# Patient Record
Sex: MALE | Race: WHITE | Employment: FULL TIME | URBAN - NONMETROPOLITAN AREA
[De-identification: names, ages, dates, MRNs, and addresses within clinical notes are randomized per-mention and may not be internally consistent; named-entity substitution may affect disease eponyms.]

---

## 2017-01-11 ENCOUNTER — OFFICE VISIT (OUTPATIENT)
Dept: NEUROLOGY | Age: 37
End: 2017-01-11
Payer: COMMERCIAL

## 2017-01-11 VITALS
BODY MASS INDEX: 46.65 KG/M2 | DIASTOLIC BLOOD PRESSURE: 90 MMHG | HEIGHT: 69 IN | HEART RATE: 88 BPM | SYSTOLIC BLOOD PRESSURE: 140 MMHG | WEIGHT: 315 LBS

## 2017-01-11 DIAGNOSIS — G47.33 OBSTRUCTIVE SLEEP APNEA: Primary | ICD-10-CM

## 2017-01-11 DIAGNOSIS — Z99.89 CPAP (CONTINUOUS POSITIVE AIRWAY PRESSURE) DEPENDENCE: ICD-10-CM

## 2017-01-11 DIAGNOSIS — G47.419 NARCOLEPSY WITHOUT CATAPLEXY(347.00): ICD-10-CM

## 2017-01-11 PROCEDURE — 99213 OFFICE O/P EST LOW 20 MIN: CPT | Performed by: PHYSICIAN ASSISTANT

## 2017-01-11 RX ORDER — DEXTROAMPHETAMINE SACCHARATE, AMPHETAMINE ASPARTATE MONOHYDRATE, DEXTROAMPHETAMINE SULFATE AND AMPHETAMINE SULFATE 7.5; 7.5; 7.5; 7.5 MG/1; MG/1; MG/1; MG/1
30 CAPSULE, EXTENDED RELEASE ORAL 2 TIMES DAILY
Qty: 60 CAPSULE | Refills: 0 | Status: SHIPPED | OUTPATIENT
Start: 2017-01-11 | End: 2017-02-14 | Stop reason: SDUPTHER

## 2017-02-14 RX ORDER — DEXTROAMPHETAMINE SACCHARATE, AMPHETAMINE ASPARTATE MONOHYDRATE, DEXTROAMPHETAMINE SULFATE AND AMPHETAMINE SULFATE 7.5; 7.5; 7.5; 7.5 MG/1; MG/1; MG/1; MG/1
30 CAPSULE, EXTENDED RELEASE ORAL 2 TIMES DAILY
Qty: 60 CAPSULE | Refills: 0 | Status: SHIPPED | OUTPATIENT
Start: 2017-02-14 | End: 2017-03-13 | Stop reason: SDUPTHER

## 2017-03-13 RX ORDER — DEXTROAMPHETAMINE SACCHARATE, AMPHETAMINE ASPARTATE MONOHYDRATE, DEXTROAMPHETAMINE SULFATE AND AMPHETAMINE SULFATE 7.5; 7.5; 7.5; 7.5 MG/1; MG/1; MG/1; MG/1
30 CAPSULE, EXTENDED RELEASE ORAL 2 TIMES DAILY
Qty: 60 CAPSULE | Refills: 0 | Status: SHIPPED | OUTPATIENT
Start: 2017-03-13 | End: 2017-04-11 | Stop reason: SDUPTHER

## 2017-04-11 RX ORDER — DEXTROAMPHETAMINE SACCHARATE, AMPHETAMINE ASPARTATE MONOHYDRATE, DEXTROAMPHETAMINE SULFATE AND AMPHETAMINE SULFATE 7.5; 7.5; 7.5; 7.5 MG/1; MG/1; MG/1; MG/1
30 CAPSULE, EXTENDED RELEASE ORAL 2 TIMES DAILY
Qty: 60 CAPSULE | Refills: 0 | Status: SHIPPED | OUTPATIENT
Start: 2017-04-12 | End: 2017-05-08 | Stop reason: SDUPTHER

## 2017-04-25 ENCOUNTER — OFFICE VISIT (OUTPATIENT)
Dept: NEUROLOGY | Age: 37
End: 2017-04-25
Payer: COMMERCIAL

## 2017-04-25 VITALS
BODY MASS INDEX: 46.65 KG/M2 | RESPIRATION RATE: 22 BRPM | HEART RATE: 94 BPM | HEIGHT: 69 IN | WEIGHT: 315 LBS | DIASTOLIC BLOOD PRESSURE: 88 MMHG | SYSTOLIC BLOOD PRESSURE: 138 MMHG

## 2017-04-25 DIAGNOSIS — G47.33 OBSTRUCTIVE SLEEP APNEA: Primary | ICD-10-CM

## 2017-04-25 DIAGNOSIS — G47.419 NARCOLEPSY WITHOUT CATAPLEXY(347.00): ICD-10-CM

## 2017-04-25 PROCEDURE — 99213 OFFICE O/P EST LOW 20 MIN: CPT | Performed by: PSYCHIATRY & NEUROLOGY

## 2017-05-08 RX ORDER — DEXTROAMPHETAMINE SACCHARATE, AMPHETAMINE ASPARTATE MONOHYDRATE, DEXTROAMPHETAMINE SULFATE AND AMPHETAMINE SULFATE 7.5; 7.5; 7.5; 7.5 MG/1; MG/1; MG/1; MG/1
30 CAPSULE, EXTENDED RELEASE ORAL 2 TIMES DAILY
Qty: 60 CAPSULE | Refills: 0 | Status: SHIPPED | OUTPATIENT
Start: 2017-05-12 | End: 2017-06-08 | Stop reason: SDUPTHER

## 2017-05-12 ENCOUNTER — TELEPHONE (OUTPATIENT)
Dept: NEUROLOGY | Age: 37
End: 2017-05-12

## 2017-06-08 RX ORDER — DEXTROAMPHETAMINE SACCHARATE, AMPHETAMINE ASPARTATE MONOHYDRATE, DEXTROAMPHETAMINE SULFATE AND AMPHETAMINE SULFATE 7.5; 7.5; 7.5; 7.5 MG/1; MG/1; MG/1; MG/1
30 CAPSULE, EXTENDED RELEASE ORAL 2 TIMES DAILY
Qty: 60 CAPSULE | Refills: 0 | Status: SHIPPED | OUTPATIENT
Start: 2017-06-08 | End: 2017-07-05 | Stop reason: SDUPTHER

## 2017-07-05 RX ORDER — DEXTROAMPHETAMINE SACCHARATE, AMPHETAMINE ASPARTATE MONOHYDRATE, DEXTROAMPHETAMINE SULFATE AND AMPHETAMINE SULFATE 7.5; 7.5; 7.5; 7.5 MG/1; MG/1; MG/1; MG/1
30 CAPSULE, EXTENDED RELEASE ORAL 2 TIMES DAILY
Qty: 60 CAPSULE | Refills: 0 | Status: SHIPPED | OUTPATIENT
Start: 2017-07-08 | End: 2017-08-04 | Stop reason: SDUPTHER

## 2017-08-07 RX ORDER — DEXTROAMPHETAMINE SACCHARATE, AMPHETAMINE ASPARTATE MONOHYDRATE, DEXTROAMPHETAMINE SULFATE AND AMPHETAMINE SULFATE 7.5; 7.5; 7.5; 7.5 MG/1; MG/1; MG/1; MG/1
30 CAPSULE, EXTENDED RELEASE ORAL 2 TIMES DAILY
Qty: 60 CAPSULE | Refills: 0 | Status: SHIPPED | OUTPATIENT
Start: 2017-08-07 | End: 2017-09-06 | Stop reason: SDUPTHER

## 2017-08-28 ENCOUNTER — OFFICE VISIT (OUTPATIENT)
Dept: NEUROLOGY | Age: 37
End: 2017-08-28
Payer: COMMERCIAL

## 2017-08-28 VITALS
SYSTOLIC BLOOD PRESSURE: 145 MMHG | OXYGEN SATURATION: 96 % | WEIGHT: 315 LBS | HEIGHT: 69 IN | BODY MASS INDEX: 46.65 KG/M2 | HEART RATE: 107 BPM | DIASTOLIC BLOOD PRESSURE: 89 MMHG

## 2017-08-28 DIAGNOSIS — G47.419 NARCOLEPSY WITHOUT CATAPLEXY(347.00): ICD-10-CM

## 2017-08-28 DIAGNOSIS — G47.33 OBSTRUCTIVE SLEEP APNEA: Primary | ICD-10-CM

## 2017-08-28 DIAGNOSIS — Z99.89 CPAP (CONTINUOUS POSITIVE AIRWAY PRESSURE) DEPENDENCE: ICD-10-CM

## 2017-08-28 PROCEDURE — 99213 OFFICE O/P EST LOW 20 MIN: CPT | Performed by: PHYSICIAN ASSISTANT

## 2017-09-08 RX ORDER — DEXTROAMPHETAMINE SACCHARATE, AMPHETAMINE ASPARTATE MONOHYDRATE, DEXTROAMPHETAMINE SULFATE AND AMPHETAMINE SULFATE 7.5; 7.5; 7.5; 7.5 MG/1; MG/1; MG/1; MG/1
30 CAPSULE, EXTENDED RELEASE ORAL 2 TIMES DAILY
Qty: 60 CAPSULE | Refills: 0 | Status: CANCELLED | OUTPATIENT
Start: 2017-09-08

## 2017-09-08 RX ORDER — DEXTROAMPHETAMINE SACCHARATE, AMPHETAMINE ASPARTATE MONOHYDRATE, DEXTROAMPHETAMINE SULFATE AND AMPHETAMINE SULFATE 7.5; 7.5; 7.5; 7.5 MG/1; MG/1; MG/1; MG/1
30 CAPSULE, EXTENDED RELEASE ORAL 2 TIMES DAILY
Qty: 60 CAPSULE | Refills: 0 | Status: SHIPPED | OUTPATIENT
Start: 2017-09-08 | End: 2017-10-05 | Stop reason: SDUPTHER

## 2017-10-05 RX ORDER — DEXTROAMPHETAMINE SACCHARATE, AMPHETAMINE ASPARTATE MONOHYDRATE, DEXTROAMPHETAMINE SULFATE AND AMPHETAMINE SULFATE 7.5; 7.5; 7.5; 7.5 MG/1; MG/1; MG/1; MG/1
30 CAPSULE, EXTENDED RELEASE ORAL 2 TIMES DAILY
Qty: 60 CAPSULE | Refills: 0 | Status: SHIPPED | OUTPATIENT
Start: 2017-10-08 | End: 2017-11-08 | Stop reason: SDUPTHER

## 2017-10-05 NOTE — TELEPHONE ENCOUNTER
Patient is requesting refill for Adderall.   Last OV 8/28/17  Next OV  12/04/17  Last Rx 9/8/17  [facility]

## 2017-11-08 NOTE — TELEPHONE ENCOUNTER
Patient is requesting refill for Adderall.   Last OV 8/28/17  Next OV  12/4/17  Last Rx 10/08/17  Cjlorna Adamso 8/27/17

## 2017-11-10 RX ORDER — DEXTROAMPHETAMINE SACCHARATE, AMPHETAMINE ASPARTATE MONOHYDRATE, DEXTROAMPHETAMINE SULFATE AND AMPHETAMINE SULFATE 7.5; 7.5; 7.5; 7.5 MG/1; MG/1; MG/1; MG/1
30 CAPSULE, EXTENDED RELEASE ORAL 2 TIMES DAILY
Qty: 60 CAPSULE | Refills: 0 | Status: SHIPPED | OUTPATIENT
Start: 2017-11-10 | End: 2017-12-04 | Stop reason: SDUPTHER

## 2017-12-04 ENCOUNTER — OFFICE VISIT (OUTPATIENT)
Dept: NEUROLOGY | Age: 37
End: 2017-12-04
Payer: COMMERCIAL

## 2017-12-04 VITALS
WEIGHT: 315 LBS | DIASTOLIC BLOOD PRESSURE: 94 MMHG | SYSTOLIC BLOOD PRESSURE: 149 MMHG | HEART RATE: 97 BPM | BODY MASS INDEX: 46.65 KG/M2 | RESPIRATION RATE: 22 BRPM | HEIGHT: 69 IN

## 2017-12-04 DIAGNOSIS — G47.33 OBSTRUCTIVE SLEEP APNEA: ICD-10-CM

## 2017-12-04 DIAGNOSIS — G47.419 NARCOLEPSY WITHOUT CATAPLEXY: Primary | ICD-10-CM

## 2017-12-04 PROCEDURE — 1036F TOBACCO NON-USER: CPT | Performed by: PSYCHIATRY & NEUROLOGY

## 2017-12-04 PROCEDURE — G8417 CALC BMI ABV UP PARAM F/U: HCPCS | Performed by: PSYCHIATRY & NEUROLOGY

## 2017-12-04 PROCEDURE — 99214 OFFICE O/P EST MOD 30 MIN: CPT | Performed by: PSYCHIATRY & NEUROLOGY

## 2017-12-04 PROCEDURE — G8427 DOCREV CUR MEDS BY ELIG CLIN: HCPCS | Performed by: PSYCHIATRY & NEUROLOGY

## 2017-12-04 PROCEDURE — G8484 FLU IMMUNIZE NO ADMIN: HCPCS | Performed by: PSYCHIATRY & NEUROLOGY

## 2017-12-04 RX ORDER — DEXTROAMPHETAMINE SACCHARATE, AMPHETAMINE ASPARTATE MONOHYDRATE, DEXTROAMPHETAMINE SULFATE AND AMPHETAMINE SULFATE 7.5; 7.5; 7.5; 7.5 MG/1; MG/1; MG/1; MG/1
30 CAPSULE, EXTENDED RELEASE ORAL 2 TIMES DAILY
Qty: 60 CAPSULE | Refills: 0 | Status: SHIPPED | OUTPATIENT
Start: 2017-12-04 | End: 2018-01-03 | Stop reason: SDUPTHER

## 2017-12-04 NOTE — PROGRESS NOTES
327.23    He is sleep deprived and not using his CPAP    Plan:   1. Advised him to get more sleep and use his CPAP. Discussed cleaning CPAP and humidifier chamber and the So Clean device  2. I discussed the diagnosis of obstructive sleep apnea with Luz Fish including the pathophysiology (namely the mechanism of breathing and obstruction of upper airway, interruptions of sleep, hypoxemia, hypercapnia, and results of repetitive sympathetic activation), risks, evaluation, and treatment options. 3. I discussed the risks of driving when drowsy and advised that Luz Fish not drive when drowsy and avoid sedating medications and respiratory suppressants. 4. FU in 4 months.     Electronically signed by Julianne Garcia MD on 12/4/2017

## 2018-01-03 RX ORDER — DEXTROAMPHETAMINE SACCHARATE, AMPHETAMINE ASPARTATE MONOHYDRATE, DEXTROAMPHETAMINE SULFATE AND AMPHETAMINE SULFATE 7.5; 7.5; 7.5; 7.5 MG/1; MG/1; MG/1; MG/1
30 CAPSULE, EXTENDED RELEASE ORAL 2 TIMES DAILY
Qty: 60 CAPSULE | Refills: 0 | Status: SHIPPED | OUTPATIENT
Start: 2018-01-03 | End: 2018-02-05 | Stop reason: SDUPTHER

## 2018-01-18 ENCOUNTER — TELEPHONE (OUTPATIENT)
Dept: NEUROLOGY | Age: 38
End: 2018-01-18

## 2018-02-05 RX ORDER — DEXTROAMPHETAMINE SACCHARATE, AMPHETAMINE ASPARTATE MONOHYDRATE, DEXTROAMPHETAMINE SULFATE AND AMPHETAMINE SULFATE 7.5; 7.5; 7.5; 7.5 MG/1; MG/1; MG/1; MG/1
30 CAPSULE, EXTENDED RELEASE ORAL 2 TIMES DAILY
Qty: 60 CAPSULE | Refills: 0 | Status: SHIPPED | OUTPATIENT
Start: 2018-02-05 | End: 2018-03-07 | Stop reason: SDUPTHER

## 2018-03-07 RX ORDER — DEXTROAMPHETAMINE SACCHARATE, AMPHETAMINE ASPARTATE MONOHYDRATE, DEXTROAMPHETAMINE SULFATE AND AMPHETAMINE SULFATE 7.5; 7.5; 7.5; 7.5 MG/1; MG/1; MG/1; MG/1
30 CAPSULE, EXTENDED RELEASE ORAL 2 TIMES DAILY
Qty: 60 CAPSULE | Refills: 0 | Status: SHIPPED | OUTPATIENT
Start: 2018-03-08 | End: 2018-04-05 | Stop reason: SDUPTHER

## 2018-04-05 RX ORDER — DEXTROAMPHETAMINE SACCHARATE, AMPHETAMINE ASPARTATE MONOHYDRATE, DEXTROAMPHETAMINE SULFATE AND AMPHETAMINE SULFATE 7.5; 7.5; 7.5; 7.5 MG/1; MG/1; MG/1; MG/1
30 CAPSULE, EXTENDED RELEASE ORAL 2 TIMES DAILY
Qty: 60 CAPSULE | Refills: 0 | Status: SHIPPED | OUTPATIENT
Start: 2018-04-05 | End: 2018-05-03 | Stop reason: SDUPTHER

## 2018-05-01 ENCOUNTER — TELEPHONE (OUTPATIENT)
Dept: NEUROLOGY | Age: 38
End: 2018-05-01

## 2018-05-02 ENCOUNTER — OFFICE VISIT (OUTPATIENT)
Dept: NEUROSURGERY | Age: 38
End: 2018-05-02
Payer: COMMERCIAL

## 2018-05-02 VITALS
HEART RATE: 104 BPM | WEIGHT: 315 LBS | BODY MASS INDEX: 46.65 KG/M2 | HEIGHT: 69 IN | DIASTOLIC BLOOD PRESSURE: 96 MMHG | SYSTOLIC BLOOD PRESSURE: 156 MMHG

## 2018-05-02 DIAGNOSIS — Z99.89 CPAP (CONTINUOUS POSITIVE AIRWAY PRESSURE) DEPENDENCE: ICD-10-CM

## 2018-05-02 DIAGNOSIS — G47.33 OBSTRUCTIVE SLEEP APNEA: Primary | ICD-10-CM

## 2018-05-02 DIAGNOSIS — G47.419 NARCOLEPSY WITHOUT CATAPLEXY: ICD-10-CM

## 2018-05-02 PROCEDURE — 99213 OFFICE O/P EST LOW 20 MIN: CPT | Performed by: NURSE PRACTITIONER

## 2018-05-03 RX ORDER — DEXTROAMPHETAMINE SACCHARATE, AMPHETAMINE ASPARTATE MONOHYDRATE, DEXTROAMPHETAMINE SULFATE AND AMPHETAMINE SULFATE 7.5; 7.5; 7.5; 7.5 MG/1; MG/1; MG/1; MG/1
30 CAPSULE, EXTENDED RELEASE ORAL 2 TIMES DAILY
Qty: 62 CAPSULE | Refills: 0 | Status: SHIPPED | OUTPATIENT
Start: 2018-05-05 | End: 2018-06-11 | Stop reason: SDUPTHER

## 2018-05-09 ENCOUNTER — OFFICE VISIT (OUTPATIENT)
Dept: FAMILY MEDICINE CLINIC | Age: 38
End: 2018-05-09
Payer: COMMERCIAL

## 2018-05-09 VITALS
DIASTOLIC BLOOD PRESSURE: 90 MMHG | HEIGHT: 69 IN | BODY MASS INDEX: 46.65 KG/M2 | TEMPERATURE: 96.7 F | WEIGHT: 315 LBS | HEART RATE: 96 BPM | OXYGEN SATURATION: 97 % | SYSTOLIC BLOOD PRESSURE: 150 MMHG

## 2018-05-09 DIAGNOSIS — M54.6 CHRONIC MIDLINE THORACIC BACK PAIN: ICD-10-CM

## 2018-05-09 DIAGNOSIS — G47.419 NARCOLEPSY WITHOUT CATAPLEXY: Primary | ICD-10-CM

## 2018-05-09 DIAGNOSIS — M54.42 CHRONIC MIDLINE LOW BACK PAIN WITH BILATERAL SCIATICA: ICD-10-CM

## 2018-05-09 DIAGNOSIS — G89.29 CHRONIC MIDLINE THORACIC BACK PAIN: ICD-10-CM

## 2018-05-09 DIAGNOSIS — M54.41 CHRONIC MIDLINE LOW BACK PAIN WITH BILATERAL SCIATICA: ICD-10-CM

## 2018-05-09 DIAGNOSIS — E78.01 FAMILIAL HYPERCHOLESTEROLEMIA: ICD-10-CM

## 2018-05-09 DIAGNOSIS — G47.33 OBSTRUCTIVE SLEEP APNEA: ICD-10-CM

## 2018-05-09 DIAGNOSIS — G89.29 CHRONIC MIDLINE LOW BACK PAIN WITH BILATERAL SCIATICA: ICD-10-CM

## 2018-05-09 DIAGNOSIS — I10 ESSENTIAL HYPERTENSION: ICD-10-CM

## 2018-05-09 PROBLEM — E78.5 HYPERLIPIDEMIA: Status: ACTIVE | Noted: 2018-05-09

## 2018-05-09 PROCEDURE — 1036F TOBACCO NON-USER: CPT | Performed by: NURSE PRACTITIONER

## 2018-05-09 PROCEDURE — 99203 OFFICE O/P NEW LOW 30 MIN: CPT | Performed by: NURSE PRACTITIONER

## 2018-05-09 PROCEDURE — G8427 DOCREV CUR MEDS BY ELIG CLIN: HCPCS | Performed by: NURSE PRACTITIONER

## 2018-05-09 PROCEDURE — G8417 CALC BMI ABV UP PARAM F/U: HCPCS | Performed by: NURSE PRACTITIONER

## 2018-05-09 RX ORDER — NAPROXEN 500 MG/1
500 TABLET ORAL 2 TIMES DAILY PRN
Qty: 60 TABLET | Refills: 1 | Status: SHIPPED | OUTPATIENT
Start: 2018-05-09 | End: 2019-05-24 | Stop reason: ALTCHOICE

## 2018-05-09 RX ORDER — LISINOPRIL 10 MG/1
10 TABLET ORAL DAILY
Qty: 30 TABLET | Refills: 3 | Status: SHIPPED | OUTPATIENT
Start: 2018-05-09 | End: 2018-07-10 | Stop reason: SDUPTHER

## 2018-05-09 ASSESSMENT — ENCOUNTER SYMPTOMS
SHORTNESS OF BREATH: 0
NAUSEA: 0
CHEST TIGHTNESS: 0
DIARRHEA: 0
SORE THROAT: 0
ABDOMINAL PAIN: 0
BACK PAIN: 1
WHEEZING: 0
COUGH: 0

## 2018-05-09 ASSESSMENT — PATIENT HEALTH QUESTIONNAIRE - PHQ9
1. LITTLE INTEREST OR PLEASURE IN DOING THINGS: 0
SUM OF ALL RESPONSES TO PHQ9 QUESTIONS 1 & 2: 0
SUM OF ALL RESPONSES TO PHQ QUESTIONS 1-9: 0
2. FEELING DOWN, DEPRESSED OR HOPELESS: 0

## 2018-05-14 DIAGNOSIS — E78.01 FAMILIAL HYPERCHOLESTEROLEMIA: ICD-10-CM

## 2018-05-14 DIAGNOSIS — I10 ESSENTIAL HYPERTENSION: ICD-10-CM

## 2018-05-14 LAB
ALBUMIN SERPL-MCNC: 4 G/DL (ref 3.5–5.2)
ALP BLD-CCNC: 62 U/L (ref 40–130)
ALT SERPL-CCNC: 59 U/L (ref 5–41)
ANION GAP SERPL CALCULATED.3IONS-SCNC: 14 MMOL/L (ref 7–19)
AST SERPL-CCNC: 33 U/L (ref 5–40)
BASOPHILS ABSOLUTE: 0.1 K/UL (ref 0–0.2)
BASOPHILS RELATIVE PERCENT: 0.9 % (ref 0–1)
BILIRUB SERPL-MCNC: 0.3 MG/DL (ref 0.2–1.2)
BUN BLDV-MCNC: 14 MG/DL (ref 6–20)
CALCIUM SERPL-MCNC: 8.8 MG/DL (ref 8.6–10)
CHLORIDE BLD-SCNC: 102 MMOL/L (ref 98–111)
CHOLESTEROL, TOTAL: 184 MG/DL (ref 160–199)
CO2: 26 MMOL/L (ref 22–29)
CREAT SERPL-MCNC: 0.9 MG/DL (ref 0.5–1.2)
EOSINOPHILS ABSOLUTE: 0.3 K/UL (ref 0–0.6)
EOSINOPHILS RELATIVE PERCENT: 3.1 % (ref 0–5)
GFR NON-AFRICAN AMERICAN: >60
GLUCOSE BLD-MCNC: 127 MG/DL (ref 74–109)
HCT VFR BLD CALC: 49 % (ref 42–52)
HDLC SERPL-MCNC: 42 MG/DL (ref 55–121)
HEMOGLOBIN: 15.6 G/DL (ref 14–18)
LDL CHOLESTEROL CALCULATED: 110 MG/DL
LYMPHOCYTES ABSOLUTE: 3.6 K/UL (ref 1.1–4.5)
LYMPHOCYTES RELATIVE PERCENT: 36.8 % (ref 20–40)
MCH RBC QN AUTO: 29 PG (ref 27–31)
MCHC RBC AUTO-ENTMCNC: 31.8 G/DL (ref 33–37)
MCV RBC AUTO: 91.1 FL (ref 80–94)
MONOCYTES ABSOLUTE: 1 K/UL (ref 0–0.9)
MONOCYTES RELATIVE PERCENT: 10.6 % (ref 0–10)
NEUTROPHILS ABSOLUTE: 4.7 K/UL (ref 1.5–7.5)
NEUTROPHILS RELATIVE PERCENT: 47.7 % (ref 50–65)
PDW BLD-RTO: 13.8 % (ref 11.5–14.5)
PLATELET # BLD: 316 K/UL (ref 130–400)
PMV BLD AUTO: 10.1 FL (ref 9.4–12.4)
POTASSIUM SERPL-SCNC: 4.2 MMOL/L (ref 3.5–5)
RBC # BLD: 5.38 M/UL (ref 4.7–6.1)
SODIUM BLD-SCNC: 142 MMOL/L (ref 136–145)
TOTAL PROTEIN: 6.8 G/DL (ref 6.6–8.7)
TRIGL SERPL-MCNC: 162 MG/DL (ref 0–149)
WBC # BLD: 9.8 K/UL (ref 4.8–10.8)

## 2018-05-25 ENCOUNTER — HOSPITAL ENCOUNTER (OUTPATIENT)
Dept: GENERAL RADIOLOGY | Age: 38
Discharge: HOME | End: 2018-05-25
Payer: COMMERCIAL

## 2018-05-25 DIAGNOSIS — M54.42 CHRONIC MIDLINE LOW BACK PAIN WITH BILATERAL SCIATICA: ICD-10-CM

## 2018-05-25 DIAGNOSIS — M54.41 CHRONIC MIDLINE LOW BACK PAIN WITH BILATERAL SCIATICA: ICD-10-CM

## 2018-05-25 DIAGNOSIS — G89.29 CHRONIC MIDLINE THORACIC BACK PAIN: Primary | ICD-10-CM

## 2018-05-25 DIAGNOSIS — M54.6 CHRONIC MIDLINE THORACIC BACK PAIN: Primary | ICD-10-CM

## 2018-05-25 DIAGNOSIS — G89.29 CHRONIC MIDLINE LOW BACK PAIN WITH BILATERAL SCIATICA: ICD-10-CM

## 2018-05-25 PROCEDURE — 72072 X-RAY EXAM THORAC SPINE 3VWS: CPT

## 2018-05-25 PROCEDURE — 72100 X-RAY EXAM L-S SPINE 2/3 VWS: CPT

## 2018-06-11 DIAGNOSIS — G47.419 NARCOLEPSY WITHOUT CATAPLEXY: Primary | ICD-10-CM

## 2018-06-11 RX ORDER — DEXTROAMPHETAMINE SACCHARATE, AMPHETAMINE ASPARTATE MONOHYDRATE, DEXTROAMPHETAMINE SULFATE AND AMPHETAMINE SULFATE 7.5; 7.5; 7.5; 7.5 MG/1; MG/1; MG/1; MG/1
30 CAPSULE, EXTENDED RELEASE ORAL 2 TIMES DAILY
Qty: 62 CAPSULE | Refills: 0 | Status: SHIPPED | OUTPATIENT
Start: 2018-06-11 | End: 2018-07-12 | Stop reason: SDUPTHER

## 2018-07-10 ENCOUNTER — OFFICE VISIT (OUTPATIENT)
Dept: FAMILY MEDICINE CLINIC | Age: 38
End: 2018-07-10
Payer: COMMERCIAL

## 2018-07-10 VITALS
OXYGEN SATURATION: 94 % | BODY MASS INDEX: 50.06 KG/M2 | WEIGHT: 315 LBS | SYSTOLIC BLOOD PRESSURE: 132 MMHG | RESPIRATION RATE: 18 BRPM | DIASTOLIC BLOOD PRESSURE: 90 MMHG | TEMPERATURE: 98.7 F | HEART RATE: 98 BPM

## 2018-07-10 DIAGNOSIS — R73.9 HYPERGLYCEMIA: ICD-10-CM

## 2018-07-10 DIAGNOSIS — G89.29 CHRONIC MIDLINE LOW BACK PAIN WITH BILATERAL SCIATICA: Primary | ICD-10-CM

## 2018-07-10 DIAGNOSIS — M13.0 POLYARTHRITIS: ICD-10-CM

## 2018-07-10 DIAGNOSIS — G89.29 CHRONIC MIDLINE THORACIC BACK PAIN: ICD-10-CM

## 2018-07-10 DIAGNOSIS — M54.6 CHRONIC MIDLINE THORACIC BACK PAIN: ICD-10-CM

## 2018-07-10 DIAGNOSIS — I10 ESSENTIAL (PRIMARY) HYPERTENSION: ICD-10-CM

## 2018-07-10 DIAGNOSIS — E66.01 MORBID OBESITY WITH BODY MASS INDEX (BMI) OF 50.0 TO 59.9 IN ADULT (HCC): ICD-10-CM

## 2018-07-10 DIAGNOSIS — M54.41 CHRONIC MIDLINE LOW BACK PAIN WITH BILATERAL SCIATICA: Primary | ICD-10-CM

## 2018-07-10 DIAGNOSIS — G47.33 OBSTRUCTIVE SLEEP APNEA: ICD-10-CM

## 2018-07-10 DIAGNOSIS — G47.419 NARCOLEPSY WITHOUT CATAPLEXY: ICD-10-CM

## 2018-07-10 DIAGNOSIS — M54.42 CHRONIC MIDLINE LOW BACK PAIN WITH BILATERAL SCIATICA: Primary | ICD-10-CM

## 2018-07-10 LAB
ALBUMIN SERPL-MCNC: 4.1 G/DL (ref 3.5–5.2)
ALP BLD-CCNC: 69 U/L (ref 40–130)
ALT SERPL-CCNC: 65 U/L (ref 5–41)
ANION GAP SERPL CALCULATED.3IONS-SCNC: 16 MMOL/L (ref 7–19)
AST SERPL-CCNC: 35 U/L (ref 5–40)
BASOPHILS ABSOLUTE: 0.1 K/UL (ref 0–0.2)
BASOPHILS RELATIVE PERCENT: 0.8 % (ref 0–1)
BILIRUB SERPL-MCNC: 0.3 MG/DL (ref 0.2–1.2)
BILIRUBIN URINE: NEGATIVE
BLOOD, URINE: NEGATIVE
BUN BLDV-MCNC: 11 MG/DL (ref 6–20)
C-REACTIVE PROTEIN: 1.31 MG/DL (ref 0–0.5)
CALCIUM SERPL-MCNC: 9 MG/DL (ref 8.6–10)
CHLORIDE BLD-SCNC: 102 MMOL/L (ref 98–111)
CLARITY: CLEAR
CO2: 24 MMOL/L (ref 22–29)
COLOR: YELLOW
CREAT SERPL-MCNC: 0.8 MG/DL (ref 0.5–1.2)
EOSINOPHILS ABSOLUTE: 0.1 K/UL (ref 0–0.6)
EOSINOPHILS RELATIVE PERCENT: 1.5 % (ref 0–5)
GFR NON-AFRICAN AMERICAN: >60
GLUCOSE BLD-MCNC: 106 MG/DL (ref 74–109)
GLUCOSE URINE: NEGATIVE MG/DL
HBA1C MFR BLD: 6.6 %
HCT VFR BLD CALC: 48.2 % (ref 42–52)
HEMOGLOBIN: 15.7 G/DL (ref 14–18)
KETONES, URINE: NEGATIVE MG/DL
LEUKOCYTE ESTERASE, URINE: NEGATIVE
LYMPHOCYTES ABSOLUTE: 3.4 K/UL (ref 1.1–4.5)
LYMPHOCYTES RELATIVE PERCENT: 35 % (ref 20–40)
MCH RBC QN AUTO: 29.5 PG (ref 27–31)
MCHC RBC AUTO-ENTMCNC: 32.6 G/DL (ref 33–37)
MCV RBC AUTO: 90.4 FL (ref 80–94)
MONOCYTES ABSOLUTE: 0.8 K/UL (ref 0–0.9)
MONOCYTES RELATIVE PERCENT: 8 % (ref 0–10)
NEUTROPHILS ABSOLUTE: 5.2 K/UL (ref 1.5–7.5)
NEUTROPHILS RELATIVE PERCENT: 54.1 % (ref 50–65)
NITRITE, URINE: NEGATIVE
PDW BLD-RTO: 13.7 % (ref 11.5–14.5)
PH UA: 6
PLATELET # BLD: 323 K/UL (ref 130–400)
PMV BLD AUTO: 10.3 FL (ref 9.4–12.4)
POTASSIUM SERPL-SCNC: 4.3 MMOL/L (ref 3.5–5)
PROTEIN UA: NEGATIVE MG/DL
RBC # BLD: 5.33 M/UL (ref 4.7–6.1)
RHEUMATOID FACTOR: <10 IU/ML
SEDIMENTATION RATE, ERYTHROCYTE: 20 MM/HR (ref 0–10)
SODIUM BLD-SCNC: 142 MMOL/L (ref 136–145)
SPECIFIC GRAVITY UA: 1.02
TOTAL PROTEIN: 7.4 G/DL (ref 6.6–8.7)
URIC ACID, SERUM: 7.2 MG/DL (ref 3.4–7)
UROBILINOGEN, URINE: 0.2 E.U./DL
WBC # BLD: 9.6 K/UL (ref 4.8–10.8)

## 2018-07-10 PROCEDURE — G8417 CALC BMI ABV UP PARAM F/U: HCPCS | Performed by: FAMILY MEDICINE

## 2018-07-10 PROCEDURE — 1036F TOBACCO NON-USER: CPT | Performed by: FAMILY MEDICINE

## 2018-07-10 PROCEDURE — G8427 DOCREV CUR MEDS BY ELIG CLIN: HCPCS | Performed by: FAMILY MEDICINE

## 2018-07-10 PROCEDURE — 99214 OFFICE O/P EST MOD 30 MIN: CPT | Performed by: FAMILY MEDICINE

## 2018-07-10 RX ORDER — TIZANIDINE 4 MG/1
4 TABLET ORAL 2 TIMES DAILY PRN
Qty: 30 TABLET | Refills: 2 | Status: SHIPPED | OUTPATIENT
Start: 2018-07-10 | End: 2018-12-16 | Stop reason: SDUPTHER

## 2018-07-10 RX ORDER — NAPROXEN 500 MG/1
TABLET ORAL
Refills: 1 | COMMUNITY
Start: 2018-05-09 | End: 2018-07-10

## 2018-07-10 RX ORDER — LISINOPRIL 10 MG/1
10 TABLET ORAL DAILY
Qty: 30 TABLET | Refills: 3 | Status: SHIPPED | OUTPATIENT
Start: 2018-07-10 | End: 2018-11-06 | Stop reason: SDUPTHER
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
## 2018-07-12 DIAGNOSIS — G47.419 NARCOLEPSY WITHOUT CATAPLEXY: ICD-10-CM

## 2018-07-12 RX ORDER — DEXTROAMPHETAMINE SACCHARATE, AMPHETAMINE ASPARTATE MONOHYDRATE, DEXTROAMPHETAMINE SULFATE AND AMPHETAMINE SULFATE 7.5; 7.5; 7.5; 7.5 MG/1; MG/1; MG/1; MG/1
30 CAPSULE, EXTENDED RELEASE ORAL 2 TIMES DAILY
Qty: 62 CAPSULE | Refills: 0 | Status: SHIPPED | OUTPATIENT
Start: 2018-07-12 | End: 2018-08-07 | Stop reason: SDUPTHER

## 2018-07-12 NOTE — TELEPHONE ENCOUNTER
Requested Prescriptions     Pending Prescriptions Disp Refills    amphetamine-dextroamphetamine (ADDERALL XR) 30 MG extended release capsule 62 capsule 0     Sig: Take 1 capsule by mouth 2 times daily for 31 days. .     Last ov 5/2/2018  Next ov 8/7/2018  Last rx  6/11/2018  Aayush Parmar up to date

## 2018-07-13 LAB — ANA IGG, ELISA: NORMAL

## 2018-07-15 ASSESSMENT — ENCOUNTER SYMPTOMS
ANAL BLEEDING: 0
DIARRHEA: 0
SHORTNESS OF BREATH: 0
NAUSEA: 0
CHEST TIGHTNESS: 0
COUGH: 0
CONSTIPATION: 0
ABDOMINAL PAIN: 0

## 2018-07-17 ENCOUNTER — HOSPITAL ENCOUNTER (OUTPATIENT)
Dept: MRI IMAGING | Age: 38
Discharge: HOME | End: 2018-07-17
Payer: COMMERCIAL

## 2018-07-17 DIAGNOSIS — G89.29 CHRONIC MIDLINE LOW BACK PAIN WITH BILATERAL SCIATICA: ICD-10-CM

## 2018-07-17 DIAGNOSIS — M54.42 CHRONIC MIDLINE LOW BACK PAIN WITH BILATERAL SCIATICA: ICD-10-CM

## 2018-07-17 DIAGNOSIS — M54.41 CHRONIC MIDLINE LOW BACK PAIN WITH BILATERAL SCIATICA: ICD-10-CM

## 2018-07-17 PROCEDURE — 72148 MRI LUMBAR SPINE W/O DYE: CPT

## 2018-08-07 ENCOUNTER — OFFICE VISIT (OUTPATIENT)
Dept: NEUROLOGY | Age: 38
End: 2018-08-07
Payer: COMMERCIAL

## 2018-08-07 VITALS
BODY MASS INDEX: 46.65 KG/M2 | WEIGHT: 315 LBS | SYSTOLIC BLOOD PRESSURE: 150 MMHG | HEART RATE: 113 BPM | DIASTOLIC BLOOD PRESSURE: 96 MMHG | HEIGHT: 69 IN

## 2018-08-07 DIAGNOSIS — G47.419 NARCOLEPSY WITHOUT CATAPLEXY: Primary | ICD-10-CM

## 2018-08-07 DIAGNOSIS — G47.33 OBSTRUCTIVE SLEEP APNEA: ICD-10-CM

## 2018-08-07 PROCEDURE — 1036F TOBACCO NON-USER: CPT | Performed by: PSYCHIATRY & NEUROLOGY

## 2018-08-07 PROCEDURE — G8427 DOCREV CUR MEDS BY ELIG CLIN: HCPCS | Performed by: PSYCHIATRY & NEUROLOGY

## 2018-08-07 PROCEDURE — 99213 OFFICE O/P EST LOW 20 MIN: CPT | Performed by: PSYCHIATRY & NEUROLOGY

## 2018-08-07 PROCEDURE — G8417 CALC BMI ABV UP PARAM F/U: HCPCS | Performed by: PSYCHIATRY & NEUROLOGY

## 2018-08-07 RX ORDER — DEXTROAMPHETAMINE SACCHARATE, AMPHETAMINE ASPARTATE MONOHYDRATE, DEXTROAMPHETAMINE SULFATE AND AMPHETAMINE SULFATE 7.5; 7.5; 7.5; 7.5 MG/1; MG/1; MG/1; MG/1
30 CAPSULE, EXTENDED RELEASE ORAL 2 TIMES DAILY
Qty: 62 CAPSULE | Refills: 0 | Status: SHIPPED | OUTPATIENT
Start: 2018-08-07 | End: 2018-09-12 | Stop reason: SDUPTHER

## 2018-08-07 NOTE — PROGRESS NOTES
in [facility], and works as an  at [employer].    Medications:  Current Outpatient Prescriptions   Medication Sig Dispense Refill    amphetamine-dextroamphetamine (ADDERALL XR) 30 MG extended release capsule Take 1 capsule by mouth 2 times daily for 31 days. . 62 capsule 0    lisinopril (PRINIVIL;ZESTRIL) 10 MG tablet Take 1 tablet by mouth daily 30 tablet 3    tiZANidine (ZANAFLEX) 4 MG tablet Take 1 tablet by mouth 2 times daily as needed (muscle spasm) 30 tablet 2    naproxen (EC NAPROSYN) 500 MG EC tablet Take 1 tablet by mouth 2 times daily as needed for Pain 60 tablet 1     No current facility-administered medications for this visit. Allergies:   Allergies as of 08/07/2018    (No Known Allergies)       Review of Systems:  General ROS: negative for - chills or fever  Psychological ROS: negative for - anxiety or depression  ENT ROS: negative for - headaches or sinus pain  Hematological and Lymphatic ROS: negative for - bleeding problems, bruising or swollen lymph nodes  Respiratory ROS: negative for - cough, hemoptysis or shortness of breath  Cardiovascular ROS: negative for - chest pain or palpitations  Gastrointestinal ROS: negative for - blood in stools, constipation, diarrhea or nausea/vomiting  Genito-Urinary ROS: negative for - hematuria or urinary frequency/urgency  Musculoskeletal ROS: negative for - joint pain, joint stiffness or joint swelling  Neurological ROS: negative for - memory loss, numbness/tingling or weakness     Examination:  Vitals:  BP (!) 150/96   Pulse 113   Ht 5' 9\" (1.753 m)   Wt (!) 339 lb (153.8 kg)   BMI 50.06 kg/m²   General appearance:  He is an obese man who is in no distress  HEENT:  Sclera clear, anicteric, Oropharynx clear, no lesions, Neck supple with midline trachea, Thyroid without masses and Trachea midline  Mallampati 3, tonsillar hypertrophy  Heart::  regular rate and rhythm, S1, S2 normal, no murmur, click, rub or gallop  Lungs:  clear to auscultation bilaterally  Extremities:  extremities normal, atraumatic, no cyanosis or edema  Neurologic:  Extraocular movements are intact without nystagmus.  Visual fields are full to confrontation.  Facial movements are symmetrical and normal.  Speech is precise.  Extremity strength is normal in both uppers and lowers.  Deep tendon reflexes are intact and symmetrical.  Rapid alternating movements are unimpaired.  Finger-to-nose testing is performed well, without dysmetria.  Gait is normal.    Pertinent Diagnostic Studies:  DAYNA is appropriate    Assessment:       ICD-10-CM ICD-9-CM    1. Narcolepsy without cataplexy G47.419 347.00    2. Obstructive sleep apnea G47.33 327.23    He is not sleeping well at night. His daytime drowsiness is better with the Adderal.    Plan:   1. Continue CPAP use  2. I discussed the diagnosis of obstructive sleep apnea with Jami Diego including the pathophysiology (namely the mechanism of breathing and obstruction of upper airway, interruptions of sleep, hypoxemia, hypercapnia, and results of repetitive sympathetic activation), risks, evaluation, and treatment options. 3. I discussed the risks of driving when drowsy and advised that Gurvinder Cortez not drive when drowsy and avoid sedating medications and respiratory suppressants. 4. Continue the Adderal  5. Restart Xyrem. Discussed potential side effects.   6. FU in 4 months    Electronically signed by Stephen Stout MD on 8/7/2018

## 2018-08-10 ENCOUNTER — TELEPHONE (OUTPATIENT)
Dept: NEUROLOGY | Age: 38
End: 2018-08-10

## 2018-09-12 DIAGNOSIS — G47.419 NARCOLEPSY WITHOUT CATAPLEXY: ICD-10-CM

## 2018-09-12 RX ORDER — DEXTROAMPHETAMINE SACCHARATE, AMPHETAMINE ASPARTATE MONOHYDRATE, DEXTROAMPHETAMINE SULFATE AND AMPHETAMINE SULFATE 7.5; 7.5; 7.5; 7.5 MG/1; MG/1; MG/1; MG/1
30 CAPSULE, EXTENDED RELEASE ORAL 2 TIMES DAILY
Qty: 60 CAPSULE | Refills: 0 | Status: SHIPPED | OUTPATIENT
Start: 2018-09-12 | End: 2018-10-09 | Stop reason: SDUPTHER

## 2018-10-09 DIAGNOSIS — G47.419 NARCOLEPSY WITHOUT CATAPLEXY: ICD-10-CM

## 2018-10-09 RX ORDER — DEXTROAMPHETAMINE SACCHARATE, AMPHETAMINE ASPARTATE MONOHYDRATE, DEXTROAMPHETAMINE SULFATE AND AMPHETAMINE SULFATE 7.5; 7.5; 7.5; 7.5 MG/1; MG/1; MG/1; MG/1
30 CAPSULE, EXTENDED RELEASE ORAL 2 TIMES DAILY
Qty: 60 CAPSULE | Refills: 0 | Status: SHIPPED | OUTPATIENT
Start: 2018-10-12 | End: 2018-11-09 | Stop reason: SDUPTHER

## 2018-11-06 DIAGNOSIS — I10 ESSENTIAL (PRIMARY) HYPERTENSION: ICD-10-CM

## 2018-11-06 RX ORDER — LISINOPRIL 10 MG/1
10 TABLET ORAL DAILY
Qty: 90 TABLET | Refills: 3 | Status: SHIPPED | OUTPATIENT
Start: 2018-11-06 | End: 2019-09-16 | Stop reason: ALTCHOICE

## 2018-11-09 ENCOUNTER — HOSPITAL ENCOUNTER (OUTPATIENT)
Dept: NEUROLOGY | Age: 38
Discharge: HOME | End: 2018-11-09
Payer: COMMERCIAL

## 2018-11-09 ENCOUNTER — HOSPITAL ENCOUNTER (OUTPATIENT)
Dept: MRI IMAGING | Age: 38
Discharge: HOME | End: 2018-11-09
Payer: COMMERCIAL

## 2018-11-09 DIAGNOSIS — M54.6 THORACIC SPINE PAIN: ICD-10-CM

## 2018-11-09 DIAGNOSIS — G47.419 NARCOLEPSY WITHOUT CATAPLEXY: ICD-10-CM

## 2018-11-09 PROCEDURE — 95886 MUSC TEST DONE W/N TEST COMP: CPT

## 2018-11-09 PROCEDURE — 72146 MRI CHEST SPINE W/O DYE: CPT

## 2018-11-09 PROCEDURE — 95886 MUSC TEST DONE W/N TEST COMP: CPT | Performed by: PSYCHIATRY & NEUROLOGY

## 2018-11-09 PROCEDURE — 95911 NRV CNDJ TEST 9-10 STUDIES: CPT

## 2018-11-09 PROCEDURE — 95911 NRV CNDJ TEST 9-10 STUDIES: CPT | Performed by: PSYCHIATRY & NEUROLOGY

## 2018-11-09 RX ORDER — DEXTROAMPHETAMINE SACCHARATE, AMPHETAMINE ASPARTATE MONOHYDRATE, DEXTROAMPHETAMINE SULFATE AND AMPHETAMINE SULFATE 7.5; 7.5; 7.5; 7.5 MG/1; MG/1; MG/1; MG/1
30 CAPSULE, EXTENDED RELEASE ORAL 2 TIMES DAILY
Qty: 60 CAPSULE | Refills: 0 | Status: SHIPPED | OUTPATIENT
Start: 2018-11-09 | End: 2018-12-10 | Stop reason: SDUPTHER

## 2018-11-12 ENCOUNTER — OFFICE VISIT (OUTPATIENT)
Dept: FAMILY MEDICINE CLINIC | Age: 38
End: 2018-11-12
Payer: COMMERCIAL

## 2018-11-12 VITALS
OXYGEN SATURATION: 96 % | TEMPERATURE: 97.6 F | DIASTOLIC BLOOD PRESSURE: 82 MMHG | BODY MASS INDEX: 51.24 KG/M2 | WEIGHT: 315 LBS | SYSTOLIC BLOOD PRESSURE: 138 MMHG | RESPIRATION RATE: 20 BRPM | HEART RATE: 114 BPM

## 2018-11-12 DIAGNOSIS — J20.9 ACUTE BRONCHITIS, UNSPECIFIED ORGANISM: Primary | ICD-10-CM

## 2018-11-12 PROCEDURE — G8427 DOCREV CUR MEDS BY ELIG CLIN: HCPCS | Performed by: FAMILY MEDICINE

## 2018-11-12 PROCEDURE — 96372 THER/PROPH/DIAG INJ SC/IM: CPT | Performed by: FAMILY MEDICINE

## 2018-11-12 PROCEDURE — 99213 OFFICE O/P EST LOW 20 MIN: CPT | Performed by: FAMILY MEDICINE

## 2018-11-12 PROCEDURE — G8417 CALC BMI ABV UP PARAM F/U: HCPCS | Performed by: FAMILY MEDICINE

## 2018-11-12 PROCEDURE — 1036F TOBACCO NON-USER: CPT | Performed by: FAMILY MEDICINE

## 2018-11-12 PROCEDURE — G8484 FLU IMMUNIZE NO ADMIN: HCPCS | Performed by: FAMILY MEDICINE

## 2018-11-12 RX ORDER — AZITHROMYCIN 250 MG/1
TABLET, FILM COATED ORAL
Qty: 1 PACKET | Refills: 0 | Status: SHIPPED | OUTPATIENT
Start: 2018-11-12 | End: 2018-11-17

## 2018-11-12 RX ORDER — DEXAMETHASONE SODIUM PHOSPHATE 10 MG/ML
10 INJECTION INTRAMUSCULAR; INTRAVENOUS ONCE
Status: COMPLETED | OUTPATIENT
Start: 2018-11-12 | End: 2018-11-12

## 2018-11-12 RX ORDER — ALBUTEROL SULFATE 90 UG/1
2 AEROSOL, METERED RESPIRATORY (INHALATION) EVERY 6 HOURS PRN
Qty: 1 INHALER | Refills: 0 | Status: SHIPPED | OUTPATIENT
Start: 2018-11-12 | End: 2018-12-13 | Stop reason: SDUPTHER

## 2018-11-12 RX ADMIN — DEXAMETHASONE SODIUM PHOSPHATE 10 MG: 10 INJECTION INTRAMUSCULAR; INTRAVENOUS at 15:53

## 2018-11-12 NOTE — LETTER
[address]  Phone: [phone]  Fax: [phone]    Eufemia Gunderson MD        November 12, 2018     Patient: Parvez Palacios   YOB: 1980   Date of Visit: 11/12/2018       To Whom it May Concern:    Cheyenne Mcintyre was seen in my clinic on 11/12/2018. He may return to school on 11/14/2018. If you have any questions or concerns, please don't hesitate to call.     Sincerely,         Eufemia Gunderson MD

## 2018-11-13 ENCOUNTER — TELEPHONE (OUTPATIENT)
Dept: FAMILY MEDICINE CLINIC | Age: 38
End: 2018-11-13

## 2018-11-13 NOTE — TELEPHONE ENCOUNTER
I returned a call from Mrs. Earle Brunner regarding the Medical Records Request on Natalio Layer from [facility]. I explained that to my knowledge, we had not received the request (even though she states a nurse confirmed that we had received them). I asked her to fax them once again and that I would personally go to retrieve them from the fax machine. Once I received the fax, I returned the call once again to confirm receipt. The request has been turned over to [place] for the completion of release of information.

## 2018-11-13 NOTE — TELEPHONE ENCOUNTER
Amnatenisha Revelesgilmer the wife, who is also on HIPAA called asking for . Tried calling Huma but no answer. Called  of both offices, Thomas Mcallister at [facility] let me know that she was in the middle of huddles and that patient's wife would have to leave her a message. Amna Clements stated she needed to speak with  to resolve an issue with not receiving medical records for over 30 days after requesting them. Stated she will take the next step if this doesn't get resolved today. Also stated she works at requesting office that needs his records and this isn't the only patient with this problem. Reassured I'd get this information to the , left message for Huma to call me and Amna Clements would like to be called at [phone] today. Thanks!

## 2018-11-15 ASSESSMENT — ENCOUNTER SYMPTOMS
SINUS PRESSURE: 1
SORE THROAT: 0
SINUS PAIN: 1
SHORTNESS OF BREATH: 0

## 2018-11-19 ENCOUNTER — OFFICE VISIT (OUTPATIENT)
Dept: FAMILY MEDICINE CLINIC | Age: 38
End: 2018-11-19
Payer: COMMERCIAL

## 2018-11-19 VITALS
RESPIRATION RATE: 22 BRPM | WEIGHT: 315 LBS | BODY MASS INDEX: 52.42 KG/M2 | OXYGEN SATURATION: 99 % | DIASTOLIC BLOOD PRESSURE: 88 MMHG | SYSTOLIC BLOOD PRESSURE: 124 MMHG | TEMPERATURE: 96.8 F | HEART RATE: 101 BPM

## 2018-11-19 DIAGNOSIS — J20.9 ACUTE BRONCHITIS, UNSPECIFIED ORGANISM: Primary | ICD-10-CM

## 2018-11-19 DIAGNOSIS — I10 ESSENTIAL (PRIMARY) HYPERTENSION: ICD-10-CM

## 2018-11-19 PROCEDURE — G8417 CALC BMI ABV UP PARAM F/U: HCPCS | Performed by: NURSE PRACTITIONER

## 2018-11-19 PROCEDURE — G8484 FLU IMMUNIZE NO ADMIN: HCPCS | Performed by: NURSE PRACTITIONER

## 2018-11-19 PROCEDURE — 99213 OFFICE O/P EST LOW 20 MIN: CPT | Performed by: NURSE PRACTITIONER

## 2018-11-19 PROCEDURE — G8427 DOCREV CUR MEDS BY ELIG CLIN: HCPCS | Performed by: NURSE PRACTITIONER

## 2018-11-19 PROCEDURE — 1036F TOBACCO NON-USER: CPT | Performed by: NURSE PRACTITIONER

## 2018-11-19 RX ORDER — GUAIFENESIN AND CODEINE PHOSPHATE 100; 10 MG/5ML; MG/5ML
5 SOLUTION ORAL 3 TIMES DAILY PRN
Qty: 140 ML | Refills: 0 | Status: SHIPPED | OUTPATIENT
Start: 2018-11-19 | End: 2018-11-26

## 2018-11-19 RX ORDER — BENZONATATE 200 MG/1
200 CAPSULE ORAL 3 TIMES DAILY PRN
Qty: 30 CAPSULE | Refills: 0 | Status: SHIPPED | OUTPATIENT
Start: 2018-11-19 | End: 2018-11-26

## 2018-11-19 RX ORDER — METHYLPREDNISOLONE 4 MG/1
TABLET ORAL
Qty: 1 KIT | Refills: 0 | Status: SHIPPED | OUTPATIENT
Start: 2018-11-19 | End: 2018-11-25

## 2018-11-19 RX ORDER — CEFDINIR 300 MG/1
300 CAPSULE ORAL 2 TIMES DAILY
Qty: 14 CAPSULE | Refills: 0 | Status: SHIPPED | OUTPATIENT
Start: 2018-11-19 | End: 2018-11-26

## 2018-11-19 ASSESSMENT — ENCOUNTER SYMPTOMS
CHEST TIGHTNESS: 1
ABDOMINAL PAIN: 0
NAUSEA: 0
SINUS PRESSURE: 0
WHEEZING: 0
COUGH: 1
SORE THROAT: 0
SHORTNESS OF BREATH: 0
DIARRHEA: 0

## 2018-11-19 NOTE — PROGRESS NOTES
Respirations even and unlabored. Musculoskeletal: Normal range of motion. He exhibits no edema. Lymphadenopathy:     He has no cervical adenopathy. Skin: Skin is warm and dry. No rash noted. Psychiatric: He has a normal mood and affect. Vitals reviewed. Assessment:    ICD-10-CM    1. Acute bronchitis, unspecified organism J20.9 guaiFENesin-codeine (CHERATUSSIN AC) 100-10 MG/5ML syrup   2. Essential (primary) hypertension I10        Plan:  -Cefdinir 300 mg bid x 7 days for acute bronchitis. -MDP for inflammation, persistent cough. -Tessalon tid prn during the day. Cheratussin given to take at bedtime and during the day if needed but advised of potential drowsiness, do not drive while taking.  -No cxr today since he has been afebrile. May obtain if not improving or with any worsening. -BP is controlled, continue lisinopril. -F/U as scheduled. Garfield Evans was seen today for cough. Diagnoses and all orders for this visit:    Acute bronchitis, unspecified organism  -     guaiFENesin-codeine (CHERATUSSIN AC) 100-10 MG/5ML syrup; Take 5 mLs by mouth 3 times daily as needed for Cough for up to 7 days. .    Essential (primary) hypertension    Other orders  -     cefdinir (OMNICEF) 300 MG capsule; Take 1 capsule by mouth 2 times daily for 7 days  -     methylPREDNISolone (MEDROL DOSEPACK) 4 MG tablet; Take by mouth as directed. -     benzonatate (TESSALON) 200 MG capsule; Take 1 capsule by mouth 3 times daily as needed for Cough      There are no discontinued medications. There are no Patient Instructions on file for this visit. Patient voicesunderstanding and agrees to plans along with risks and benefits of plan. Counseling:  Dodie Mandel case, medications and options were discussed in detail. Patient was instructed to call the office if he questionsregarding him treatment. Should him conditions worsen, he should return to office to be reassessed by DONALD Morris.  he Should to go the

## 2018-12-10 DIAGNOSIS — G47.419 NARCOLEPSY WITHOUT CATAPLEXY: ICD-10-CM

## 2018-12-10 RX ORDER — DEXTROAMPHETAMINE SACCHARATE, AMPHETAMINE ASPARTATE MONOHYDRATE, DEXTROAMPHETAMINE SULFATE AND AMPHETAMINE SULFATE 7.5; 7.5; 7.5; 7.5 MG/1; MG/1; MG/1; MG/1
30 CAPSULE, EXTENDED RELEASE ORAL 2 TIMES DAILY
Qty: 60 CAPSULE | Refills: 0 | Status: SHIPPED | OUTPATIENT
Start: 2018-12-10 | End: 2019-01-10 | Stop reason: SDUPTHER

## 2018-12-11 ENCOUNTER — OFFICE VISIT (OUTPATIENT)
Dept: NEUROLOGY | Age: 38
End: 2018-12-11
Payer: COMMERCIAL

## 2018-12-11 VITALS
OXYGEN SATURATION: 97 % | HEART RATE: 113 BPM | BODY MASS INDEX: 46.65 KG/M2 | WEIGHT: 315 LBS | DIASTOLIC BLOOD PRESSURE: 91 MMHG | HEIGHT: 69 IN | SYSTOLIC BLOOD PRESSURE: 141 MMHG

## 2018-12-11 DIAGNOSIS — G47.419 NARCOLEPSY WITHOUT CATAPLEXY: Primary | ICD-10-CM

## 2018-12-11 DIAGNOSIS — G47.33 OBSTRUCTIVE SLEEP APNEA: ICD-10-CM

## 2018-12-11 PROCEDURE — G8427 DOCREV CUR MEDS BY ELIG CLIN: HCPCS | Performed by: PSYCHIATRY & NEUROLOGY

## 2018-12-11 PROCEDURE — 99213 OFFICE O/P EST LOW 20 MIN: CPT | Performed by: PSYCHIATRY & NEUROLOGY

## 2018-12-11 PROCEDURE — G8417 CALC BMI ABV UP PARAM F/U: HCPCS | Performed by: PSYCHIATRY & NEUROLOGY

## 2018-12-11 PROCEDURE — G8484 FLU IMMUNIZE NO ADMIN: HCPCS | Performed by: PSYCHIATRY & NEUROLOGY

## 2018-12-11 PROCEDURE — 1036F TOBACCO NON-USER: CPT | Performed by: PSYCHIATRY & NEUROLOGY

## 2018-12-13 ENCOUNTER — OFFICE VISIT (OUTPATIENT)
Dept: FAMILY MEDICINE CLINIC | Age: 38
End: 2018-12-13
Payer: COMMERCIAL

## 2018-12-13 VITALS
SYSTOLIC BLOOD PRESSURE: 122 MMHG | HEART RATE: 106 BPM | TEMPERATURE: 97.2 F | RESPIRATION RATE: 24 BRPM | OXYGEN SATURATION: 98 % | BODY MASS INDEX: 51.83 KG/M2 | WEIGHT: 315 LBS | DIASTOLIC BLOOD PRESSURE: 88 MMHG

## 2018-12-13 DIAGNOSIS — E66.01 MORBID OBESITY WITH BODY MASS INDEX (BMI) OF 50.0 TO 59.9 IN ADULT (HCC): ICD-10-CM

## 2018-12-13 DIAGNOSIS — I10 ESSENTIAL (PRIMARY) HYPERTENSION: ICD-10-CM

## 2018-12-13 DIAGNOSIS — E78.01 FAMILIAL HYPERCHOLESTEROLEMIA: ICD-10-CM

## 2018-12-13 DIAGNOSIS — J20.9 ACUTE BRONCHITIS, UNSPECIFIED ORGANISM: ICD-10-CM

## 2018-12-13 DIAGNOSIS — G47.419 NARCOLEPSY WITHOUT CATAPLEXY: Primary | ICD-10-CM

## 2018-12-13 DIAGNOSIS — G47.33 OBSTRUCTIVE SLEEP APNEA: ICD-10-CM

## 2018-12-13 DIAGNOSIS — R73.9 HYPERGLYCEMIA: ICD-10-CM

## 2018-12-13 PROCEDURE — 99214 OFFICE O/P EST MOD 30 MIN: CPT | Performed by: FAMILY MEDICINE

## 2018-12-13 PROCEDURE — G8484 FLU IMMUNIZE NO ADMIN: HCPCS | Performed by: FAMILY MEDICINE

## 2018-12-13 PROCEDURE — 1036F TOBACCO NON-USER: CPT | Performed by: FAMILY MEDICINE

## 2018-12-13 PROCEDURE — G8427 DOCREV CUR MEDS BY ELIG CLIN: HCPCS | Performed by: FAMILY MEDICINE

## 2018-12-13 PROCEDURE — G8417 CALC BMI ABV UP PARAM F/U: HCPCS | Performed by: FAMILY MEDICINE

## 2018-12-13 RX ORDER — ALBUTEROL SULFATE 90 UG/1
2 AEROSOL, METERED RESPIRATORY (INHALATION) EVERY 6 HOURS PRN
Qty: 1 INHALER | Refills: 0 | Status: SHIPPED | OUTPATIENT
Start: 2018-12-13 | End: 2020-02-17

## 2018-12-13 NOTE — PROGRESS NOTES
and Lose it. Discussed healthy weight. 7. Familial hypercholesterolemia  Lab Results   Component Value Date    CHOL 184 05/14/2018     Lab Results   Component Value Date    TRIG 162 (H) 05/14/2018     Lab Results   Component Value Date    HDL 42 (L) 05/14/2018     Lab Results   Component Value Date    LDLCALC 110 05/14/2018     No results found for: LABVLDL, VLDL  No results found for: CHOLHDLRATIO  Check lipid panel next visit  - Lipid Panel; Future            Return in about 6 months (around 6/13/2019) for Routine follow up - 15 minute visit. Discussed use, benefit, and side effects of prescribed medications. All patient questions answered. Pt voiced understanding. Reviewed health maintenance. Instructedto continue current medications, diet and exercise. Patient agreed with treatmentplan. Follow up as directed.

## 2018-12-16 DIAGNOSIS — G89.29 CHRONIC MIDLINE LOW BACK PAIN WITH BILATERAL SCIATICA: ICD-10-CM

## 2018-12-16 DIAGNOSIS — M54.41 CHRONIC MIDLINE LOW BACK PAIN WITH BILATERAL SCIATICA: ICD-10-CM

## 2018-12-16 DIAGNOSIS — M54.42 CHRONIC MIDLINE LOW BACK PAIN WITH BILATERAL SCIATICA: ICD-10-CM

## 2018-12-17 RX ORDER — TIZANIDINE 4 MG/1
4 TABLET ORAL 2 TIMES DAILY PRN
Qty: 30 TABLET | Refills: 2 | Status: SHIPPED | OUTPATIENT
Start: 2018-12-17 | End: 2019-05-24 | Stop reason: ALTCHOICE

## 2018-12-17 ASSESSMENT — ENCOUNTER SYMPTOMS
ABDOMINAL PAIN: 0
NAUSEA: 0
SHORTNESS OF BREATH: 0
COUGH: 1
DIARRHEA: 0
CHEST TIGHTNESS: 0
CONSTIPATION: 0
ANAL BLEEDING: 0

## 2019-01-09 ENCOUNTER — OFFICE VISIT (OUTPATIENT)
Dept: FAMILY MEDICINE CLINIC | Age: 39
End: 2019-01-09
Payer: COMMERCIAL

## 2019-01-09 ENCOUNTER — HOSPITAL ENCOUNTER (OUTPATIENT)
Dept: GENERAL RADIOLOGY | Age: 39
Discharge: HOME | End: 2019-01-09
Payer: COMMERCIAL

## 2019-01-09 VITALS
HEART RATE: 93 BPM | TEMPERATURE: 97.8 F | HEIGHT: 69 IN | BODY MASS INDEX: 46.65 KG/M2 | WEIGHT: 315 LBS | SYSTOLIC BLOOD PRESSURE: 128 MMHG | DIASTOLIC BLOOD PRESSURE: 82 MMHG | OXYGEN SATURATION: 98 % | RESPIRATION RATE: 20 BRPM

## 2019-01-09 DIAGNOSIS — M53.3 COCCYDYNIA: Primary | ICD-10-CM

## 2019-01-09 DIAGNOSIS — I10 ESSENTIAL (PRIMARY) HYPERTENSION: ICD-10-CM

## 2019-01-09 DIAGNOSIS — M53.3 COCCYDYNIA: ICD-10-CM

## 2019-01-09 PROCEDURE — 1036F TOBACCO NON-USER: CPT | Performed by: NURSE PRACTITIONER

## 2019-01-09 PROCEDURE — G8417 CALC BMI ABV UP PARAM F/U: HCPCS | Performed by: NURSE PRACTITIONER

## 2019-01-09 PROCEDURE — 99213 OFFICE O/P EST LOW 20 MIN: CPT | Performed by: NURSE PRACTITIONER

## 2019-01-09 PROCEDURE — 96372 THER/PROPH/DIAG INJ SC/IM: CPT | Performed by: NURSE PRACTITIONER

## 2019-01-09 PROCEDURE — 72220 X-RAY EXAM SACRUM TAILBONE: CPT

## 2019-01-09 PROCEDURE — G8427 DOCREV CUR MEDS BY ELIG CLIN: HCPCS | Performed by: NURSE PRACTITIONER

## 2019-01-09 PROCEDURE — G8484 FLU IMMUNIZE NO ADMIN: HCPCS | Performed by: NURSE PRACTITIONER

## 2019-01-09 RX ORDER — NABUMETONE 500 MG/1
500 TABLET, FILM COATED ORAL 2 TIMES DAILY PRN
Qty: 60 TABLET | Refills: 1 | Status: SHIPPED | OUTPATIENT
Start: 2019-01-09 | End: 2019-01-14

## 2019-01-09 RX ORDER — DEXAMETHASONE SODIUM PHOSPHATE 100 MG/10ML
10 INJECTION INTRAMUSCULAR; INTRAVENOUS ONCE
Status: COMPLETED | OUTPATIENT
Start: 2019-01-09 | End: 2019-01-09

## 2019-01-09 RX ADMIN — DEXAMETHASONE SODIUM PHOSPHATE 10 MG: 100 INJECTION INTRAMUSCULAR; INTRAVENOUS at 13:06

## 2019-01-09 ASSESSMENT — ENCOUNTER SYMPTOMS
NAUSEA: 0
COUGH: 0
CHEST TIGHTNESS: 0
DIARRHEA: 0
WHEEZING: 0
BACK PAIN: 0
SORE THROAT: 0
ABDOMINAL PAIN: 0
SHORTNESS OF BREATH: 0

## 2019-01-10 DIAGNOSIS — G47.419 NARCOLEPSY WITHOUT CATAPLEXY: ICD-10-CM

## 2019-01-10 RX ORDER — DEXTROAMPHETAMINE SACCHARATE, AMPHETAMINE ASPARTATE MONOHYDRATE, DEXTROAMPHETAMINE SULFATE AND AMPHETAMINE SULFATE 7.5; 7.5; 7.5; 7.5 MG/1; MG/1; MG/1; MG/1
30 CAPSULE, EXTENDED RELEASE ORAL 2 TIMES DAILY
Qty: 60 CAPSULE | Refills: 0 | Status: SHIPPED | OUTPATIENT
Start: 2019-01-10 | End: 2019-02-07 | Stop reason: SDUPTHER

## 2019-01-11 ENCOUNTER — HOSPITAL ENCOUNTER (OUTPATIENT)
Dept: MRI IMAGING | Age: 39
Discharge: HOME | End: 2019-01-11
Payer: COMMERCIAL

## 2019-01-11 ENCOUNTER — OFFICE VISIT (OUTPATIENT)
Dept: FAMILY MEDICINE CLINIC | Age: 39
End: 2019-01-11
Payer: COMMERCIAL

## 2019-01-11 VITALS
BODY MASS INDEX: 51.98 KG/M2 | DIASTOLIC BLOOD PRESSURE: 78 MMHG | WEIGHT: 315 LBS | SYSTOLIC BLOOD PRESSURE: 124 MMHG | OXYGEN SATURATION: 98 % | HEART RATE: 97 BPM | TEMPERATURE: 97.4 F | RESPIRATION RATE: 22 BRPM

## 2019-01-11 DIAGNOSIS — M53.3 COCCYDYNIA: Primary | ICD-10-CM

## 2019-01-11 DIAGNOSIS — I10 ESSENTIAL (PRIMARY) HYPERTENSION: ICD-10-CM

## 2019-01-11 DIAGNOSIS — M54.12 RADICULOPATHY, CERVICAL: ICD-10-CM

## 2019-01-11 PROCEDURE — 99213 OFFICE O/P EST LOW 20 MIN: CPT | Performed by: NURSE PRACTITIONER

## 2019-01-11 PROCEDURE — G8417 CALC BMI ABV UP PARAM F/U: HCPCS | Performed by: NURSE PRACTITIONER

## 2019-01-11 PROCEDURE — 72141 MRI NECK SPINE W/O DYE: CPT

## 2019-01-11 PROCEDURE — 1036F TOBACCO NON-USER: CPT | Performed by: NURSE PRACTITIONER

## 2019-01-11 PROCEDURE — G8484 FLU IMMUNIZE NO ADMIN: HCPCS | Performed by: NURSE PRACTITIONER

## 2019-01-11 PROCEDURE — G8427 DOCREV CUR MEDS BY ELIG CLIN: HCPCS | Performed by: NURSE PRACTITIONER

## 2019-01-11 RX ORDER — HYDROCODONE BITARTRATE AND ACETAMINOPHEN 5; 325 MG/1; MG/1
1 TABLET ORAL EVERY 8 HOURS PRN
Qty: 10 TABLET | Refills: 0 | Status: SHIPPED | OUTPATIENT
Start: 2019-01-11 | End: 2019-01-14 | Stop reason: SDUPTHER

## 2019-01-11 ASSESSMENT — ENCOUNTER SYMPTOMS
SHORTNESS OF BREATH: 0
DIARRHEA: 0
SORE THROAT: 0
ABDOMINAL PAIN: 0
COUGH: 0
CHEST TIGHTNESS: 0
NAUSEA: 0
WHEEZING: 0
BACK PAIN: 1

## 2019-01-14 ENCOUNTER — OFFICE VISIT (OUTPATIENT)
Dept: FAMILY MEDICINE CLINIC | Age: 39
End: 2019-01-14
Payer: COMMERCIAL

## 2019-01-14 VITALS
TEMPERATURE: 97.5 F | SYSTOLIC BLOOD PRESSURE: 132 MMHG | RESPIRATION RATE: 24 BRPM | HEART RATE: 112 BPM | WEIGHT: 315 LBS | BODY MASS INDEX: 51.98 KG/M2 | OXYGEN SATURATION: 98 % | DIASTOLIC BLOOD PRESSURE: 88 MMHG

## 2019-01-14 DIAGNOSIS — M53.3 COCCYDYNIA: ICD-10-CM
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
## 2019-01-14 PROCEDURE — G8417 CALC BMI ABV UP PARAM F/U: HCPCS | Performed by: NURSE PRACTITIONER

## 2019-01-14 PROCEDURE — 1036F TOBACCO NON-USER: CPT | Performed by: NURSE PRACTITIONER

## 2019-01-14 PROCEDURE — G8427 DOCREV CUR MEDS BY ELIG CLIN: HCPCS | Performed by: NURSE PRACTITIONER

## 2019-01-14 PROCEDURE — G8484 FLU IMMUNIZE NO ADMIN: HCPCS | Performed by: NURSE PRACTITIONER

## 2019-01-14 PROCEDURE — 99213 OFFICE O/P EST LOW 20 MIN: CPT | Performed by: NURSE PRACTITIONER

## 2019-01-14 RX ORDER — HYDROCODONE BITARTRATE AND ACETAMINOPHEN 5; 325 MG/1; MG/1
1 TABLET ORAL EVERY 8 HOURS PRN
Qty: 20 TABLET | Refills: 0 | Status: SHIPPED | OUTPATIENT
Start: 2019-01-14 | End: 2019-01-21

## 2019-01-14 ASSESSMENT — ENCOUNTER SYMPTOMS
NAUSEA: 0
SORE THROAT: 0
SHORTNESS OF BREATH: 0
CHEST TIGHTNESS: 0
BACK PAIN: 0
COUGH: 0
DIARRHEA: 0
ABDOMINAL PAIN: 0
WHEEZING: 0

## 2019-01-21 ENCOUNTER — OFFICE VISIT (OUTPATIENT)
Dept: FAMILY MEDICINE CLINIC | Age: 39
End: 2019-01-21
Payer: COMMERCIAL

## 2019-01-21 VITALS
TEMPERATURE: 97.8 F | SYSTOLIC BLOOD PRESSURE: 112 MMHG | DIASTOLIC BLOOD PRESSURE: 74 MMHG | OXYGEN SATURATION: 98 % | WEIGHT: 315 LBS | HEART RATE: 103 BPM | RESPIRATION RATE: 18 BRPM | BODY MASS INDEX: 52.13 KG/M2

## 2019-01-21 DIAGNOSIS — M53.3 COCCYDYNIA: Primary | ICD-10-CM
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
## 2019-01-21 PROCEDURE — G8484 FLU IMMUNIZE NO ADMIN: HCPCS | Performed by: NURSE PRACTITIONER

## 2019-01-21 PROCEDURE — 99213 OFFICE O/P EST LOW 20 MIN: CPT | Performed by: NURSE PRACTITIONER

## 2019-01-21 PROCEDURE — G8417 CALC BMI ABV UP PARAM F/U: HCPCS | Performed by: NURSE PRACTITIONER

## 2019-01-21 PROCEDURE — 1036F TOBACCO NON-USER: CPT | Performed by: NURSE PRACTITIONER

## 2019-01-21 PROCEDURE — G8427 DOCREV CUR MEDS BY ELIG CLIN: HCPCS | Performed by: NURSE PRACTITIONER

## 2019-01-21 ASSESSMENT — ENCOUNTER SYMPTOMS
SHORTNESS OF BREATH: 0
NAUSEA: 0
COUGH: 0
SORE THROAT: 0
ABDOMINAL PAIN: 0
DIARRHEA: 0
WHEEZING: 0
CHEST TIGHTNESS: 0

## 2019-01-28 ENCOUNTER — OFFICE VISIT (OUTPATIENT)
Dept: FAMILY MEDICINE CLINIC | Age: 39
End: 2019-01-28
Payer: COMMERCIAL

## 2019-01-28 VITALS
SYSTOLIC BLOOD PRESSURE: 124 MMHG | OXYGEN SATURATION: 98 % | HEART RATE: 104 BPM | HEIGHT: 69 IN | BODY MASS INDEX: 46.65 KG/M2 | RESPIRATION RATE: 16 BRPM | WEIGHT: 315 LBS | DIASTOLIC BLOOD PRESSURE: 72 MMHG | TEMPERATURE: 97.8 F

## 2019-01-28 DIAGNOSIS — M53.3 COCCYDYNIA: Primary | ICD-10-CM

## 2019-01-28 PROCEDURE — G8417 CALC BMI ABV UP PARAM F/U: HCPCS | Performed by: NURSE PRACTITIONER

## 2019-01-28 PROCEDURE — 99213 OFFICE O/P EST LOW 20 MIN: CPT | Performed by: NURSE PRACTITIONER

## 2019-01-28 PROCEDURE — G8427 DOCREV CUR MEDS BY ELIG CLIN: HCPCS | Performed by: NURSE PRACTITIONER

## 2019-01-28 PROCEDURE — G8484 FLU IMMUNIZE NO ADMIN: HCPCS | Performed by: NURSE PRACTITIONER

## 2019-01-28 PROCEDURE — 1036F TOBACCO NON-USER: CPT | Performed by: NURSE PRACTITIONER

## 2019-01-28 ASSESSMENT — ENCOUNTER SYMPTOMS
ABDOMINAL PAIN: 0
WHEEZING: 0
CHEST TIGHTNESS: 0
SHORTNESS OF BREATH: 0
NAUSEA: 0
SORE THROAT: 0
COUGH: 0
DIARRHEA: 0

## 2019-02-07 DIAGNOSIS — G47.419 NARCOLEPSY WITHOUT CATAPLEXY: ICD-10-CM

## 2019-02-07 RX ORDER — DEXTROAMPHETAMINE SACCHARATE, AMPHETAMINE ASPARTATE MONOHYDRATE, DEXTROAMPHETAMINE SULFATE AND AMPHETAMINE SULFATE 7.5; 7.5; 7.5; 7.5 MG/1; MG/1; MG/1; MG/1
30 CAPSULE, EXTENDED RELEASE ORAL 2 TIMES DAILY
Qty: 60 CAPSULE | Refills: 0 | Status: SHIPPED | OUTPATIENT
Start: 2019-02-07 | End: 2019-03-11 | Stop reason: SDUPTHER

## 2019-03-11 DIAGNOSIS — G47.419 NARCOLEPSY WITHOUT CATAPLEXY: ICD-10-CM

## 2019-03-11 RX ORDER — DEXTROAMPHETAMINE SACCHARATE, AMPHETAMINE ASPARTATE MONOHYDRATE, DEXTROAMPHETAMINE SULFATE AND AMPHETAMINE SULFATE 7.5; 7.5; 7.5; 7.5 MG/1; MG/1; MG/1; MG/1
30 CAPSULE, EXTENDED RELEASE ORAL 2 TIMES DAILY
Qty: 60 CAPSULE | Refills: 0 | Status: SHIPPED | OUTPATIENT
Start: 2019-03-11 | End: 2019-04-15 | Stop reason: SDUPTHER

## 2019-04-12 DIAGNOSIS — G47.419 NARCOLEPSY WITHOUT CATAPLEXY: ICD-10-CM

## 2019-04-12 NOTE — TELEPHONE ENCOUNTER
Patient request script for Adderall XR 30mg BID, ;last filled 3-11-19. Last office visit 12-11-19, next office visit 4-29-19, Yonis Menendez in chart.

## 2019-04-15 RX ORDER — DEXTROAMPHETAMINE SACCHARATE, AMPHETAMINE ASPARTATE MONOHYDRATE, DEXTROAMPHETAMINE SULFATE AND AMPHETAMINE SULFATE 7.5; 7.5; 7.5; 7.5 MG/1; MG/1; MG/1; MG/1
30 CAPSULE, EXTENDED RELEASE ORAL 2 TIMES DAILY
Qty: 60 CAPSULE | Refills: 0 | Status: SHIPPED | OUTPATIENT
Start: 2019-04-15 | End: 2019-05-10 | Stop reason: SDUPTHER

## 2019-04-26 ENCOUNTER — TELEPHONE (OUTPATIENT)
Dept: FAMILY MEDICINE CLINIC | Age: 39
End: 2019-04-26

## 2019-04-26 RX ORDER — METRONIDAZOLE 500 MG/1
500 TABLET ORAL 2 TIMES DAILY
Qty: 14 TABLET | Refills: 0 | Status: SHIPPED | OUTPATIENT
Start: 2019-04-26 | End: 2019-05-03

## 2019-04-26 NOTE — TELEPHONE ENCOUNTER
Esvin Latif called. His wife has been getting recurring bacterial vaginosis. Her gynecologist suggested that he be treated also. He is asymptomatic. Pharmacy [facility]. Please advise.   Deniz Granados [facility]

## 2019-04-29 ENCOUNTER — OFFICE VISIT (OUTPATIENT)
Dept: NEUROLOGY | Age: 39
End: 2019-04-29
Payer: COMMERCIAL

## 2019-04-29 VITALS
DIASTOLIC BLOOD PRESSURE: 86 MMHG | HEIGHT: 69 IN | WEIGHT: 315 LBS | HEART RATE: 99 BPM | SYSTOLIC BLOOD PRESSURE: 135 MMHG | BODY MASS INDEX: 46.65 KG/M2 | RESPIRATION RATE: 20 BRPM

## 2019-04-29 DIAGNOSIS — G47.419 NARCOLEPSY WITHOUT CATAPLEXY: Primary | ICD-10-CM

## 2019-04-29 DIAGNOSIS — G47.33 OBSTRUCTIVE SLEEP APNEA: ICD-10-CM

## 2019-04-29 PROCEDURE — 99213 OFFICE O/P EST LOW 20 MIN: CPT | Performed by: PSYCHIATRY & NEUROLOGY

## 2019-04-29 PROCEDURE — 1036F TOBACCO NON-USER: CPT | Performed by: PSYCHIATRY & NEUROLOGY

## 2019-04-29 PROCEDURE — G8427 DOCREV CUR MEDS BY ELIG CLIN: HCPCS | Performed by: PSYCHIATRY & NEUROLOGY

## 2019-04-29 PROCEDURE — G8417 CALC BMI ABV UP PARAM F/U: HCPCS | Performed by: PSYCHIATRY & NEUROLOGY

## 2019-04-29 NOTE — PROGRESS NOTES
[facility]  [address] [place] [address] Drive, [address] [facility] [place] [address]  Citlaly Roldan  Phone [phone]  Fax [phone]     [address] Follow Up Encounter  2019 9:58 AM    Information:   Patient Name: Mehreen Rosado  :   1980  Age:   43 y.o. MRN:   [id number]  Account #:  [de-identified]  Today:  19    Provider: Robb Granger M.D. Chief Complaint:   Chief Complaint   Patient presents with    Follow-up     Narcolepsy       Subjective:   Mehreen Rosado is a 43 y.o. man with a history of narcolepsy and obstructive sleep apnea who is following up. He has tried Ritalin, Dexadrine, Xyrem, and Provigil in the past. [facility] is presently on Adderal 60 mg a day. [facility] is prescribed CPAP at 14cm for his SARY.  He has been working long hours. [facility] has no insomnia. [facility] gets 5 to 6 hours of sleep. [facility] has some fatigue and drowsiness.  He may fall asleep in the evening.  He is using his CPAP again.  The Adderal does help the drorwsiness. [facility] notices some wearing off.  His CPAP is 9years old. Objective:     Past Medical History:  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)     Obstructive sleep apnea     AHI:  29.0 per PSG,        History reviewed. No pertinent surgical history. Recent Hospitalizations  · None    Significant Injuries  · None    Habits  Mehreen Rosado reports that he has never smoked. He has never used smokeless tobacco. He reports that he drinks alcohol. He reports that he does not use drugs.     Family History   Problem Relation Age of Onset    Diabetes Mother     Hypertension Mother     Asthma Mother     Coronary Art Dis Father     Heart Attack Father     Hypertension Father     Cancer Maternal Grandmother         kidney    Stroke Paternal Grandfather     Cancer Paternal Grandfather         lung Social History  Xavier Juarez , live in [place], [place], and works as an  at [employer].    Medications:  Current Outpatient Medications   Medication Sig Dispense Refill    metroNIDAZOLE (FLAGYL) 500 MG tablet Take 1 tablet by mouth 2 times daily for 7 days 14 tablet 0    amphetamine-dextroamphetamine (ADDERALL XR) 30 MG extended release capsule Take 1 capsule by mouth 2 times daily for 30 days. 60 capsule 0    tiZANidine (ZANAFLEX) 4 MG tablet TAKE 1 TABLET BY MOUTH 2 TIMES DAILY AS NEEDED (MUSCLE SPASM) 30 tablet 2    albuterol sulfate  (90 Base) MCG/ACT inhaler Inhale 2 puffs into the lungs every 6 hours as needed for Wheezing 1 Inhaler 0    lisinopril (PRINIVIL;ZESTRIL) 10 MG tablet Take 1 tablet by mouth daily 90 tablet 3    naproxen (EC NAPROSYN) 500 MG EC tablet Take 1 tablet by mouth 2 times daily as needed for Pain 60 tablet 1     No current facility-administered medications for this visit. Allergies:   Allergies as of 04/29/2019    (No Known Allergies)       Review of Systems:  General ROS: negative for - chills or fever  Psychological ROS: negative for - anxiety or depression  ENT ROS: negative for - headaches or sinus pain  Hematological and Lymphatic ROS: negative for - bleeding problems, bruising or swollen lymph nodes  Respiratory ROS: negative for - cough, hemoptysis or shortness of breath  Cardiovascular ROS: negative for - chest pain or palpitations  Gastrointestinal ROS: negative for - blood in stools, constipation, diarrhea or nausea/vomiting  Genito-Urinary ROS: negative for - hematuria or urinary frequency/urgency  Musculoskeletal ROS: negative for - joint pain, joint stiffness or joint swelling  Neurological ROS: negative for - memory loss, numbness/tingling or weakness     Examination:  Vitals:  /86   Pulse 99   Resp 20   Ht 5' 9\" (1.753 m)   Wt (!) 359 lb 9.6 oz (163.1 kg)   BMI 53.10 kg/m²   General appearance:  He is an obese man who is in no distress  HEENT:  Sclera clear, anicteric, Oropharynx clear, no lesions, Neck supple with midline trachea, Thyroid without masses and Trachea midline  Mallampati 3, tonsillar hypertrophy  Heart::  regular rate and rhythm, S1, S2 normal, no murmur, click, rub or gallop  Lungs:  clear to auscultation bilaterally  Extremities:  extremities normal, atraumatic, no cyanosis or edema  Neurologic:  Extraocular movements are intact without nystagmus.  Visual fields are full to confrontation.  Facial movements are symmetrical and normal.  Speech is precise.  Extremity strength is normal in both uppers and lowers.  Deep tendon reflexes are intact and symmetrical.  Rapid alternating movements are unimpaired.  Finger-to-nose testing is performed well, without dysmetria.  Gait is normal.    Pertinent Diagnostic Studies:  DAYNA is appropriate    Assessment:       ICD-10-CM    1. Narcolepsy without cataplexy G47.419    2. Obstructive sleep apnea G47.33    I explained the pathophysiology of narcolepsys/idiopathic hypersomnolence with David Carpio including the proposed mechanism of hypocretin/orexin dysfunction. I discussed symptoms of narcolepsys/idiopathic hypersomnolence with David Carpio including excessive drowsiness even with adequate amount of nighttime sleep, unintentional sleep attacks, need for naps, poor attention, poor concentration, hypnogogic hallucinations, sleep paralysis, and cataplexy. I informed David Carpio of risks from narcolepsys/idiopathic hypersomnolence including accidents, injuries, poor work productivity, and social issues. In addition, I explained treatment options which include adequate sleep, strategic naps, stimulant medications, the use of Xyem, and avoidence of exacerbating medications and situations. Plan:   1. Continue CPAP use. At some point will have to re evaluate and get new machine perhaps a BiPAP  2.  I discussed the diagnosis of obstructive sleep apnea with Viky Carmona

## 2019-05-10 DIAGNOSIS — G47.419 NARCOLEPSY WITHOUT CATAPLEXY: ICD-10-CM

## 2019-05-10 RX ORDER — DEXTROAMPHETAMINE SACCHARATE, AMPHETAMINE ASPARTATE MONOHYDRATE, DEXTROAMPHETAMINE SULFATE AND AMPHETAMINE SULFATE 7.5; 7.5; 7.5; 7.5 MG/1; MG/1; MG/1; MG/1
30 CAPSULE, EXTENDED RELEASE ORAL 2 TIMES DAILY
Qty: 60 CAPSULE | Refills: 0 | Status: SHIPPED | OUTPATIENT
Start: 2019-05-10 | End: 2019-06-13 | Stop reason: SDUPTHER

## 2019-05-10 NOTE — TELEPHONE ENCOUNTER
Requested Prescriptions     Pending Prescriptions Disp Refills    amphetamine-dextroamphetamine (ADDERALL XR) 30 MG extended release capsule 60 capsule 0     Sig: Take 1 capsule by mouth 2 times daily for 30 days.      Last OV  4/29/19  Next OV  8/27/19  Last Rx  4/15/19  Michael Nicole   5/8/19

## 2019-05-21 DIAGNOSIS — R73.9 HYPERGLYCEMIA: ICD-10-CM

## 2019-05-21 DIAGNOSIS — E78.01 FAMILIAL HYPERCHOLESTEROLEMIA: ICD-10-CM

## 2019-05-21 DIAGNOSIS — I10 ESSENTIAL (PRIMARY) HYPERTENSION: ICD-10-CM

## 2019-05-21 LAB
ALBUMIN SERPL-MCNC: 3.9 G/DL (ref 3.5–5.2)
ALP BLD-CCNC: 65 U/L (ref 40–130)
ALT SERPL-CCNC: 43 U/L (ref 5–41)
ANION GAP SERPL CALCULATED.3IONS-SCNC: 16 MMOL/L (ref 7–19)
AST SERPL-CCNC: 22 U/L (ref 5–40)
BASOPHILS ABSOLUTE: 0.1 K/UL (ref 0–0.2)
BASOPHILS RELATIVE PERCENT: 0.8 % (ref 0–1)
BILIRUB SERPL-MCNC: <0.2 MG/DL (ref 0.2–1.2)
BUN BLDV-MCNC: 12 MG/DL (ref 6–20)
CALCIUM SERPL-MCNC: 9.2 MG/DL (ref 8.6–10)
CHLORIDE BLD-SCNC: 106 MMOL/L (ref 98–111)
CHOLESTEROL, TOTAL: 207 MG/DL (ref 160–199)
CO2: 24 MMOL/L (ref 22–29)
CREAT SERPL-MCNC: 0.9 MG/DL (ref 0.5–1.2)
EOSINOPHILS ABSOLUTE: 0.2 K/UL (ref 0–0.6)
EOSINOPHILS RELATIVE PERCENT: 1.4 % (ref 0–5)
GFR NON-AFRICAN AMERICAN: >60
GLUCOSE BLD-MCNC: 106 MG/DL (ref 74–109)
HBA1C MFR BLD: 6.9 % (ref 4–6)
HCT VFR BLD CALC: 49.1 % (ref 42–52)
HDLC SERPL-MCNC: 38 MG/DL (ref 55–121)
HEMOGLOBIN: 15.8 G/DL (ref 14–18)
LDL CHOLESTEROL CALCULATED: 112 MG/DL
LYMPHOCYTES ABSOLUTE: 4 K/UL (ref 1.1–4.5)
LYMPHOCYTES RELATIVE PERCENT: 38.6 % (ref 20–40)
MCH RBC QN AUTO: 29.9 PG (ref 27–31)
MCHC RBC AUTO-ENTMCNC: 32.2 G/DL (ref 33–37)
MCV RBC AUTO: 92.8 FL (ref 80–94)
MONOCYTES ABSOLUTE: 1 K/UL (ref 0–0.9)
MONOCYTES RELATIVE PERCENT: 9.4 % (ref 0–10)
NEUTROPHILS ABSOLUTE: 5.1 K/UL (ref 1.5–7.5)
NEUTROPHILS RELATIVE PERCENT: 48.9 % (ref 50–65)
PDW BLD-RTO: 14 % (ref 11.5–14.5)
PLATELET # BLD: 308 K/UL (ref 130–400)
PMV BLD AUTO: 10.4 FL (ref 9.4–12.4)
POTASSIUM SERPL-SCNC: 4.3 MMOL/L (ref 3.5–5)
RBC # BLD: 5.29 M/UL (ref 4.7–6.1)
SODIUM BLD-SCNC: 146 MMOL/L (ref 136–145)
TOTAL PROTEIN: 6.8 G/DL (ref 6.6–8.7)
TRIGL SERPL-MCNC: 286 MG/DL (ref 0–149)
WBC # BLD: 10.5 K/UL (ref 4.8–10.8)

## 2019-05-24 ENCOUNTER — OFFICE VISIT (OUTPATIENT)
Dept: FAMILY MEDICINE CLINIC | Age: 39
End: 2019-05-24
Payer: COMMERCIAL

## 2019-05-24 VITALS
OXYGEN SATURATION: 98 % | BODY MASS INDEX: 52.57 KG/M2 | SYSTOLIC BLOOD PRESSURE: 128 MMHG | TEMPERATURE: 97.7 F | HEART RATE: 93 BPM | DIASTOLIC BLOOD PRESSURE: 80 MMHG | WEIGHT: 315 LBS

## 2019-05-24 DIAGNOSIS — E78.01 FAMILIAL HYPERCHOLESTEROLEMIA: ICD-10-CM

## 2019-05-24 DIAGNOSIS — I10 ESSENTIAL (PRIMARY) HYPERTENSION: Primary | ICD-10-CM

## 2019-05-24 DIAGNOSIS — R73.9 HYPERGLYCEMIA: ICD-10-CM

## 2019-05-24 DIAGNOSIS — G47.419 NARCOLEPSY WITHOUT CATAPLEXY: ICD-10-CM

## 2019-05-24 DIAGNOSIS — M53.3 COCCYDYNIA: ICD-10-CM

## 2019-05-24 PROCEDURE — 1036F TOBACCO NON-USER: CPT | Performed by: FAMILY MEDICINE

## 2019-05-24 PROCEDURE — G8417 CALC BMI ABV UP PARAM F/U: HCPCS | Performed by: FAMILY MEDICINE

## 2019-05-24 PROCEDURE — G8427 DOCREV CUR MEDS BY ELIG CLIN: HCPCS | Performed by: FAMILY MEDICINE

## 2019-05-24 PROCEDURE — 99214 OFFICE O/P EST MOD 30 MIN: CPT | Performed by: FAMILY MEDICINE

## 2019-05-24 NOTE — PROGRESS NOTES
rales. No use of accessory muscles or intercostal retractions. Abdominal: Soft. Bowel sounds are normal. exhibits no distension. There is no tenderness. There is no rebound and no guarding. Musculoskeletal: exhibits no edema. Normal gait. Neurological: alert. Psychiatric: normal mood and affect. His behavior is normal. Normal judgement and insight observed.     Recent Results (from the past 672 hour(s))   CBC Auto Differential    Collection Time: 05/21/19 11:03 AM   Result Value Ref Range    WBC 10.5 4.8 - 10.8 K/uL    RBC 5.29 4.70 - 6.10 M/uL    Hemoglobin 15.8 14.0 - 18.0 g/dL    Hematocrit 49.1 42.0 - 52.0 %    MCV 92.8 80.0 - 94.0 fL    MCH 29.9 27.0 - 31.0 pg    MCHC 32.2 (L) 33.0 - 37.0 g/dL    RDW 14.0 11.5 - 14.5 %    Platelets 659 136 - 439 K/uL    MPV 10.4 9.4 - 12.4 fL    Neutrophils % 48.9 (L) 50.0 - 65.0 %    Lymphocytes % 38.6 20.0 - 40.0 %    Monocytes % 9.4 0.0 - 10.0 %    Eosinophils % 1.4 0.0 - 5.0 %    Basophils % 0.8 0.0 - 1.0 %    Neutrophils # 5.1 1.5 - 7.5 K/uL    Lymphocytes # 4.0 1.1 - 4.5 K/uL    Monocytes # 1.00 (H) 0.00 - 0.90 K/uL    Eosinophils # 0.20 0.00 - 0.60 K/uL    Basophils # 0.10 0.00 - 0.20 K/uL   Lipid Panel    Collection Time: 05/21/19 11:03 AM   Result Value Ref Range    Cholesterol, Total 207 (H) 160 - 199 mg/dL    Triglycerides 286 (H) 0 - 149 mg/dL    HDL 38 (L) 55 - 121 mg/dL    LDL Calculated 112 <100 mg/dL   Hemoglobin A1C    Collection Time: 05/21/19 11:03 AM   Result Value Ref Range    Hemoglobin A1C 6.9 (H) 4.0 - 6.0 %   Comprehensive Metabolic Panel    Collection Time: 05/21/19 11:03 AM   Result Value Ref Range    Sodium 146 (H) 136 - 145 mmol/L    Potassium 4.3 3.5 - 5.0 mmol/L    Chloride 106 98 - 111 mmol/L    CO2 24 22 - 29 mmol/L    Anion Gap 16 7 - 19 mmol/L    Glucose 106 74 - 109 mg/dL    BUN 12 6 - 20 mg/dL    CREATININE 0.9 0.5 - 1.2 mg/dL    GFR Non-African American >60 >60    Calcium 9.2 8.6 - 10.0 mg/dL    Total Protein 6.8 6.6 - 8.7 g/dL    Alb 3.9 3.5 - 5.2 g/dL    Total Bilirubin <0.2 0.2 - 1.2 mg/dL    Alkaline Phosphatase 65 40 - 130 U/L    ALT 43 (H) 5 - 41 U/L    AST 22 5 - 40 U/L               Assessment & Plan: The following diagnoses and conditions are stable with no further orders unless indicated:  1. Essential (primary) hypertension  BP Readings from Last 3 Encounters:   05/24/19 128/80   04/29/19 135/86   01/28/19 124/72     Continue with lisinopril 10 mg      2. Coccydynia  Symptoms are almost completely resolved    3. Narcolepsy without cataplexy  Reviewed records from neurology. He remains on stimulant medication. Blood pressure is stable. He is having no adverse effects from the medication. 4. Hyperglycemia  Lab Results   Component Value Date    LABA1C 6.9 (H) 05/21/2019     No results found for: EAG  Long discussion with the patient today regarding his elevated blood sugar. Discussed the diagnosis of diabetes mellitus likely. His fasting glucose was within normal limits however. Given the option for medication versus lifestyle modification. Encouraged ADA diet guidelines. - Comprehensive Metabolic Panel; Future  - Hemoglobin A1C; Future    5. Familial hypercholesterolemia  Discussed his cholesterol being elevated as well. Triglycerides likely related to his elevated blood sugar. Discussed lifestyle changes such as diet and exercise. Recommended eliminate processed food from diet such as sugar and fried foods. Recommended exercising at least 150 minutes/week. Try to do full body resistance training twice a week as well. Offered suggestions for calorie counting such as phone apps and online resources such as My fitness pal and Lose it. Discussed healthy weight. - Lipid Panel; Future            Return in about 3 months (around 8/24/2019) for Yearly Physical - 30 minute visit. Discussed use, benefit, and side effects of prescribed medications. All patient questions answered. Pt voiced understanding.  Reviewed health maintenance. Instructedto continue current medications, diet and exercise. Patient agreed with treatmentplan. Follow up as directed.

## 2019-05-24 NOTE — PATIENT INSTRUCTIONS
Calorie Counting  1. Download the Danilo \"Loseit\" or \"SCM-GLPal\"  2. Setup a free account  · May go to www.[employer] or www. [email or web address] for more information. 3. Calculate weight loss goal.  · Recommend starting with a goal of 1 pound of weight loss per week. 4. Input everything you eat and drink while trying to meet your calculated calorie count.   5. If you fail to get results in 6-8 weeks subtract 100 martin from the estimated calorie count from the danilo.  6. Success depends on being consistent with using the danilo and planning your meals in advance

## 2019-06-13 DIAGNOSIS — G47.419 NARCOLEPSY WITHOUT CATAPLEXY: ICD-10-CM

## 2019-06-13 RX ORDER — DEXTROAMPHETAMINE SACCHARATE, AMPHETAMINE ASPARTATE MONOHYDRATE, DEXTROAMPHETAMINE SULFATE AND AMPHETAMINE SULFATE 7.5; 7.5; 7.5; 7.5 MG/1; MG/1; MG/1; MG/1
30 CAPSULE, EXTENDED RELEASE ORAL 2 TIMES DAILY
Qty: 60 CAPSULE | Refills: 0 | Status: SHIPPED | OUTPATIENT
Start: 2019-06-13 | End: 2019-07-17 | Stop reason: SDUPTHER

## 2019-06-13 NOTE — TELEPHONE ENCOUNTER
Requested Prescriptions     Pending Prescriptions Disp Refills    amphetamine-dextroamphetamine (ADDERALL XR) 30 MG extended release capsule 60 capsule 0     Sig: Take 1 capsule by mouth 2 times daily for 30 days.        Last OV  4/29/19  Next OV  8/27/19  Last Rx  5/10/19  Byram Ripa UTD  5/8/19

## 2019-07-17 ENCOUNTER — OFFICE VISIT (OUTPATIENT)
Dept: FAMILY MEDICINE CLINIC | Age: 39
End: 2019-07-17
Payer: COMMERCIAL

## 2019-07-17 VITALS
WEIGHT: 315 LBS | BODY MASS INDEX: 46.65 KG/M2 | HEART RATE: 95 BPM | DIASTOLIC BLOOD PRESSURE: 84 MMHG | HEIGHT: 69 IN | SYSTOLIC BLOOD PRESSURE: 126 MMHG | OXYGEN SATURATION: 99 % | TEMPERATURE: 98.2 F

## 2019-07-17 DIAGNOSIS — H65.03 NON-RECURRENT ACUTE SEROUS OTITIS MEDIA OF BOTH EARS: ICD-10-CM

## 2019-07-17 DIAGNOSIS — R05.8 COUGH WITH SPUTUM: Primary | ICD-10-CM

## 2019-07-17 DIAGNOSIS — J01.40 ACUTE NON-RECURRENT PANSINUSITIS: ICD-10-CM

## 2019-07-17 DIAGNOSIS — G47.419 NARCOLEPSY WITHOUT CATAPLEXY: ICD-10-CM

## 2019-07-17 PROCEDURE — G8427 DOCREV CUR MEDS BY ELIG CLIN: HCPCS | Performed by: NURSE PRACTITIONER

## 2019-07-17 PROCEDURE — 99213 OFFICE O/P EST LOW 20 MIN: CPT | Performed by: NURSE PRACTITIONER

## 2019-07-17 PROCEDURE — 96372 THER/PROPH/DIAG INJ SC/IM: CPT | Performed by: NURSE PRACTITIONER

## 2019-07-17 PROCEDURE — G8417 CALC BMI ABV UP PARAM F/U: HCPCS | Performed by: NURSE PRACTITIONER

## 2019-07-17 PROCEDURE — 1036F TOBACCO NON-USER: CPT | Performed by: NURSE PRACTITIONER

## 2019-07-17 RX ORDER — DEXTROAMPHETAMINE SACCHARATE, AMPHETAMINE ASPARTATE MONOHYDRATE, DEXTROAMPHETAMINE SULFATE AND AMPHETAMINE SULFATE 7.5; 7.5; 7.5; 7.5 MG/1; MG/1; MG/1; MG/1
30 CAPSULE, EXTENDED RELEASE ORAL 2 TIMES DAILY
Qty: 60 CAPSULE | Refills: 0 | Status: SHIPPED | OUTPATIENT
Start: 2019-07-17 | End: 2019-08-14 | Stop reason: SDUPTHER

## 2019-07-17 RX ORDER — CEFDINIR 300 MG/1
300 CAPSULE ORAL 2 TIMES DAILY
Qty: 14 CAPSULE | Refills: 0 | Status: SHIPPED | OUTPATIENT
Start: 2019-07-17 | End: 2019-07-24

## 2019-07-17 RX ORDER — METHYLPREDNISOLONE 4 MG/1
TABLET ORAL
Qty: 1 KIT | Refills: 0 | Status: SHIPPED | OUTPATIENT
Start: 2019-07-17 | End: 2019-07-23

## 2019-07-17 RX ORDER — GUAIFENESIN AND CODEINE PHOSPHATE 100; 10 MG/5ML; MG/5ML
5-10 SOLUTION ORAL 4 TIMES DAILY PRN
Qty: 120 ML | Refills: 0 | Status: SHIPPED | OUTPATIENT
Start: 2019-07-17 | End: 2019-07-24

## 2019-07-17 RX ORDER — FLUTICASONE PROPIONATE 50 MCG
2 SPRAY, SUSPENSION (ML) NASAL DAILY
Qty: 1 BOTTLE | Refills: 2 | Status: SHIPPED | OUTPATIENT
Start: 2019-07-17 | End: 2019-10-11 | Stop reason: SDUPTHER

## 2019-07-17 RX ORDER — DEXAMETHASONE SODIUM PHOSPHATE 100 MG/10ML
10 INJECTION INTRAMUSCULAR; INTRAVENOUS ONCE
Status: COMPLETED | OUTPATIENT
Start: 2019-07-17 | End: 2019-07-17

## 2019-07-17 RX ADMIN — DEXAMETHASONE SODIUM PHOSPHATE 10 MG: 100 INJECTION INTRAMUSCULAR; INTRAVENOUS at 09:46

## 2019-07-17 ASSESSMENT — ENCOUNTER SYMPTOMS
VOMITING: 0
COUGH: 1
DIARRHEA: 0
SORE THROAT: 1
SHORTNESS OF BREATH: 0
CHEST TIGHTNESS: 0
NAUSEA: 0
SINUS PRESSURE: 1
ABDOMINAL PAIN: 0
WHEEZING: 0

## 2019-07-17 NOTE — LETTER
[address] [place] [address]  Phone: [phone]  Fax: [phone]    DONALD Silva        July 17, 2019     Patient: Beryle Merry   YOB: 1980   Date of Visit: 7/17/2019       To Whom it May Concern:    Laura Mitchell was seen in my clinic on 7/17/2019. He  May return to work 07/18/2019. If you have any questions or concerns, please don't hesitate to call.     Sincerely,         DONALD Silva

## 2019-07-17 NOTE — TELEPHONE ENCOUNTER
Dilcia Asher has requested a refill on his medication. Last office visit : 4/29/2019   Next office visit : 8/27/2019   Last medication refill :6-13-19  [facility]   Dr Dmitriy Rocha out of office. Requested Prescriptions     Pending Prescriptions Disp Refills    amphetamine-dextroamphetamine (ADDERALL XR) 30 MG extended release capsule 60 capsule 0     Sig: Take 1 capsule by mouth 2 times daily for 30 days.

## 2019-07-17 NOTE — PROGRESS NOTES
regular rhythm, normal heart sounds and intact distal pulses. No murmur heard. Pulmonary/Chest: Effort normal and breath sounds normal. No respiratory distress. Musculoskeletal: Normal range of motion. He exhibits no edema. Lymphadenopathy:     He has no cervical adenopathy. Skin: Skin is warm and dry. No rash noted. Psychiatric: He has a normal mood and affect. Vitals reviewed. Assessment:    ICD-10-CM    1. Cough with sputum R05 guaiFENesin-codeine (CHERATUSSIN AC) 100-10 MG/5ML syrup   2. Acute non-recurrent pansinusitis J01.40    3. Non-recurrent acute serous otitis media of both ears H65.03        Plan:  - Cefdinir 300 mg twice daily x7 days for acute sinusitis  - Dexamethasone 10 mg IM for sinus inflammation/pressure/pain and persistent cough  - Medrol Dosepak to start in 24 to 48 hours if pressure and cough are not improving  - Flonase for sinus  pain and nasal congestion  - Cheratussin cough syrup 5 to 10 mL's may take up to 4 times a day but advised to take mainly at bedtime for cough to help with sleep, advised of potential drowsiness. May take Robitussin during the day. - Advised to report any acute worsening or no improvement  -Follow-up as scheduled    [facility] was seen today for congestion, cough, headache and other. Diagnoses and all orders for this visit:    Cough with sputum  -     guaiFENesin-codeine (CHERATUSSIN AC) 100-10 MG/5ML syrup; Take 5-10 mLs by mouth 4 times daily as needed for Cough for up to 7 days. Acute non-recurrent pansinusitis    Non-recurrent acute serous otitis media of both ears    Other orders  -     dexamethasone (DECADRON) injection 10 mg  -     cefdinir (OMNICEF) 300 MG capsule; Take 1 capsule by mouth 2 times daily for 7 days  -     methylPREDNISolone (MEDROL DOSEPACK) 4 MG tablet; Take by mouth as directed. -     fluticasone (FLONASE) 50 MCG/ACT nasal spray; 2 sprays by Each Nostril route daily      There are no discontinued medications.   There are no

## 2019-08-14 DIAGNOSIS — G47.419 NARCOLEPSY WITHOUT CATAPLEXY: ICD-10-CM

## 2019-08-14 RX ORDER — DEXTROAMPHETAMINE SACCHARATE, AMPHETAMINE ASPARTATE MONOHYDRATE, DEXTROAMPHETAMINE SULFATE AND AMPHETAMINE SULFATE 7.5; 7.5; 7.5; 7.5 MG/1; MG/1; MG/1; MG/1
30 CAPSULE, EXTENDED RELEASE ORAL 2 TIMES DAILY
Qty: 60 CAPSULE | Refills: 0 | Status: SHIPPED | OUTPATIENT
Start: 2019-08-15 | End: 2019-09-19 | Stop reason: SDUPTHER

## 2019-08-27 ENCOUNTER — OFFICE VISIT (OUTPATIENT)
Dept: NEUROLOGY | Age: 39
End: 2019-08-27
Payer: COMMERCIAL

## 2019-08-27 VITALS
WEIGHT: 315 LBS | HEART RATE: 99 BPM | SYSTOLIC BLOOD PRESSURE: 138 MMHG | RESPIRATION RATE: 18 BRPM | BODY MASS INDEX: 46.65 KG/M2 | DIASTOLIC BLOOD PRESSURE: 88 MMHG | HEIGHT: 69 IN

## 2019-08-27 DIAGNOSIS — G47.419 NARCOLEPSY WITHOUT CATAPLEXY: Primary | ICD-10-CM

## 2019-08-27 DIAGNOSIS — G47.33 OBSTRUCTIVE SLEEP APNEA: ICD-10-CM

## 2019-08-27 PROCEDURE — 99213 OFFICE O/P EST LOW 20 MIN: CPT | Performed by: PSYCHIATRY & NEUROLOGY

## 2019-08-27 PROCEDURE — G8417 CALC BMI ABV UP PARAM F/U: HCPCS | Performed by: PSYCHIATRY & NEUROLOGY

## 2019-08-27 PROCEDURE — 1036F TOBACCO NON-USER: CPT | Performed by: PSYCHIATRY & NEUROLOGY

## 2019-08-27 PROCEDURE — G8427 DOCREV CUR MEDS BY ELIG CLIN: HCPCS | Performed by: PSYCHIATRY & NEUROLOGY

## 2019-08-27 NOTE — PROGRESS NOTES
normal, no murmur, click, rub or gallop  Lungs:  clear to auscultation bilaterally  Extremities:  extremities normal, atraumatic, no cyanosis or edema  Neurologic:  Extraocular movements are intact without nystagmus.  Visual fields are full to confrontation.  Facial movements are symmetrical and normal.  Speech is precise.  Extremity strength is normal in both uppers and lowers.  Deep tendon reflexes are intact and symmetrical.  Rapid alternating movements are unimpaired.  Finger-to-nose testing is performed well, without dysmetria.  Gait is normal.    Pertinent Diagnostic Studies:  DAYNA is appropriate    Assessment:       ICD-10-CM    1. Narcolepsy without cataplexy G47.419    2. Obstructive sleep apnea G47.33    Discussed [place] with him. Plan:   1. Continue CPAP use during sleep  2. I discussed the diagnosis of obstructive sleep apnea with Christina Clermont including the pathophysiology (namely the mechanism of breathing and obstruction of upper airway, interruptions of sleep, hypoxemia, hypercapnia, and results of repetitive sympathetic activation), risks, evaluation, and treatment options. 3. I discussed the risks of driving when drowsy and advised that Christina Clermont not drive when drowsy and avoid sedating medications and respiratory suppressants. 4. Continue Adderal but may consider change to [facility].   5. FU in 4 months    Electronically signed by Josselin Hoff MD on 8/27/2019

## 2019-09-16 ENCOUNTER — OFFICE VISIT (OUTPATIENT)
Dept: FAMILY MEDICINE CLINIC | Age: 39
End: 2019-09-16
Payer: COMMERCIAL

## 2019-09-16 VITALS
DIASTOLIC BLOOD PRESSURE: 86 MMHG | TEMPERATURE: 97 F | WEIGHT: 315 LBS | SYSTOLIC BLOOD PRESSURE: 112 MMHG | HEART RATE: 103 BPM | BODY MASS INDEX: 53.31 KG/M2 | OXYGEN SATURATION: 98 %

## 2019-09-16 DIAGNOSIS — Z13.220 LIPID SCREENING: ICD-10-CM

## 2019-09-16 DIAGNOSIS — I10 ESSENTIAL (PRIMARY) HYPERTENSION: ICD-10-CM

## 2019-09-16 DIAGNOSIS — R73.9 HYPERGLYCEMIA: Primary | ICD-10-CM

## 2019-09-16 LAB — HBA1C MFR BLD: 6.7 %

## 2019-09-16 PROCEDURE — G8427 DOCREV CUR MEDS BY ELIG CLIN: HCPCS | Performed by: FAMILY MEDICINE

## 2019-09-16 PROCEDURE — 83036 HEMOGLOBIN GLYCOSYLATED A1C: CPT | Performed by: FAMILY MEDICINE

## 2019-09-16 PROCEDURE — G8417 CALC BMI ABV UP PARAM F/U: HCPCS | Performed by: FAMILY MEDICINE

## 2019-09-16 PROCEDURE — 1036F TOBACCO NON-USER: CPT | Performed by: FAMILY MEDICINE

## 2019-09-16 PROCEDURE — 99213 OFFICE O/P EST LOW 20 MIN: CPT | Performed by: FAMILY MEDICINE

## 2019-09-16 RX ORDER — LOSARTAN POTASSIUM 50 MG/1
50 TABLET ORAL DAILY
Qty: 90 TABLET | Refills: 3 | Status: SHIPPED | OUTPATIENT
Start: 2019-09-16 | End: 2020-05-19 | Stop reason: ALTCHOICE

## 2019-09-16 ASSESSMENT — ENCOUNTER SYMPTOMS
SHORTNESS OF BREATH: 0
DIARRHEA: 0
COUGH: 1
CHEST TIGHTNESS: 0
NAUSEA: 0
ANAL BLEEDING: 0
CONSTIPATION: 0
ABDOMINAL PAIN: 0

## 2019-09-16 ASSESSMENT — PATIENT HEALTH QUESTIONNAIRE - PHQ9
SUM OF ALL RESPONSES TO PHQ QUESTIONS 1-9: 0
SUM OF ALL RESPONSES TO PHQ QUESTIONS 1-9: 0
SUM OF ALL RESPONSES TO PHQ9 QUESTIONS 1 & 2: 0
1. LITTLE INTEREST OR PLEASURE IN DOING THINGS: 0
2. FEELING DOWN, DEPRESSED OR HOPELESS: 0

## 2019-09-16 NOTE — PROGRESS NOTES
spray 2 sprays by Each Nostril route daily 1 Bottle 2    albuterol sulfate  (90 Base) MCG/ACT inhaler Inhale 2 puffs into the lungs every 6 hours as needed for Wheezing 1 Inhaler 0     No current facility-administered medications for this visit. No Known Allergies    Health Maintenance   Topic Date Due    Diabetic foot exam  07/17/2020 (Originally 11/6/1989)    Diabetic retinal exam  07/17/2020 (Originally 11/6/1989)    HIV screen  07/17/2020 (Originally 11/6/1994)    Diabetic microalbuminuria test  07/28/2020 (Originally 11/6/1997)    Flu vaccine (1) 09/16/2020 (Originally 9/1/2019)    DTaP/Tdap/Td vaccine (1 - Tdap) 01/01/2022 (Originally 5/2/2012)    A1C test (Diabetic or Prediabetic)  05/21/2020    Lipid screen  05/21/2020    Potassium monitoring  05/21/2020    Creatinine monitoring  05/21/2020    Pneumococcal 0-64 years Vaccine  Aged Out       Subjective:      Review of Systems   Constitutional: Negative for chills and fever. HENT: Negative for congestion. Respiratory: Positive for cough. Negative for chest tightness and shortness of breath. Cardiovascular: Negative for chest pain, palpitations and leg swelling. Gastrointestinal: Negative for abdominal pain, anal bleeding, constipation, diarrhea and nausea. Genitourinary: Negative for difficulty urinating. Psychiatric/Behavioral: Negative. A1c: 6.7%  SeeHPI for visit specific review of symptoms. All others negative      Objective:   /86   Pulse 103   Temp 97 °F (36.1 °C)   Wt (!) 361 lb (163.7 kg)   SpO2 98%   BMI 53.31 kg/m²   Physical Exam   Constitutional: He appears well-developed. He does not appear ill. Eyes: Pupils are equal, round, and reactive to light. Neck: Normal range of motion. Neck supple. Cardiovascular: Normal rate and regular rhythm. Exam reveals no friction rub. No murmur heard. Pulmonary/Chest: Effort normal and breath sounds normal. No respiratory distress. He has no wheezes. He has no rales. Abdominal: Soft. Bowel sounds are normal. He exhibits no distension. There is no tenderness. There is no rebound and no guarding. Musculoskeletal: He exhibits no edema. Neurological: He is alert. Psychiatric: He has a normal mood and affect. His behavior is normal.         No results found for this or any previous visit (from the past 672 hour(s)). Assessment & Plan: The following diagnoses and conditions are stable with no further orders unless indicated:  1. Hyperglycemia  Slight improvement in his glucose since last visit. Encourage diet exercise and weight loss  Discussed lifestyle changes such as diet and exercise. Recommended eliminate processed food from diet such as sugar and fried foods. Recommended exercising at least 150 minutes/week. Try to do full body resistance training twice a week as well. Offered suggestions for calorie counting such as phone apps and online resources such as My fitness pal and Lose it. Discussed healthy weight.    - Hemoglobin A1C; Future    2. Essential (primary) hypertension  BP Readings from Last 3 Encounters:   09/16/19 112/86   08/27/19 138/88   07/17/19 126/84     Blood pressure stable possible his ACE inhibitor is contributing to his cough. We'll switch to losartan. Discussed blood pressure goals  - Comprehensive Metabolic Panel; Future  - CBC Auto Differential; Future    3. Lipid screening  Recommend checking lipid panel prior to next visit  - Lipid Panel; Future            Discussed lifestyle changes such as diet and exercise. Recommended eliminate processed food from diet such as sugar and fried foods. Recommended exercising at least 150 minutes/week. Try to do full body resistance training twice a week as well. Offered suggestions for calorie counting such as phone apps and online resources such as My fitness pal and Lose it. Discussed healthy weight.         Return in about 4 months (around 1/16/2020) for Yearly Physical -

## 2019-09-19 DIAGNOSIS — G47.419 NARCOLEPSY WITHOUT CATAPLEXY: ICD-10-CM

## 2019-09-19 RX ORDER — DEXTROAMPHETAMINE SACCHARATE, AMPHETAMINE ASPARTATE MONOHYDRATE, DEXTROAMPHETAMINE SULFATE AND AMPHETAMINE SULFATE 7.5; 7.5; 7.5; 7.5 MG/1; MG/1; MG/1; MG/1
30 CAPSULE, EXTENDED RELEASE ORAL 2 TIMES DAILY
Qty: 60 CAPSULE | Refills: 0 | Status: SHIPPED | OUTPATIENT
Start: 2019-09-19 | End: 2019-10-22 | Stop reason: SDUPTHER

## 2019-10-11 RX ORDER — FLUTICASONE PROPIONATE 50 MCG
SPRAY, SUSPENSION (ML) NASAL
Qty: 3 BOTTLE | Refills: 1 | Status: SHIPPED | OUTPATIENT
Start: 2019-10-11 | End: 2020-02-10

## 2019-10-22 DIAGNOSIS — G47.419 NARCOLEPSY WITHOUT CATAPLEXY: ICD-10-CM

## 2019-10-22 RX ORDER — DEXTROAMPHETAMINE SACCHARATE, AMPHETAMINE ASPARTATE MONOHYDRATE, DEXTROAMPHETAMINE SULFATE AND AMPHETAMINE SULFATE 7.5; 7.5; 7.5; 7.5 MG/1; MG/1; MG/1; MG/1
30 CAPSULE, EXTENDED RELEASE ORAL 2 TIMES DAILY
Qty: 60 CAPSULE | Refills: 0 | Status: SHIPPED | OUTPATIENT
Start: 2019-10-22 | End: 2019-11-25 | Stop reason: SDUPTHER

## 2019-11-25 DIAGNOSIS — G47.419 NARCOLEPSY WITHOUT CATAPLEXY: ICD-10-CM

## 2019-11-25 RX ORDER — DEXTROAMPHETAMINE SACCHARATE, AMPHETAMINE ASPARTATE MONOHYDRATE, DEXTROAMPHETAMINE SULFATE AND AMPHETAMINE SULFATE 7.5; 7.5; 7.5; 7.5 MG/1; MG/1; MG/1; MG/1
30 CAPSULE, EXTENDED RELEASE ORAL 2 TIMES DAILY
Qty: 60 CAPSULE | Refills: 0 | Status: SHIPPED | OUTPATIENT
Start: 2019-11-25 | End: 2019-12-28 | Stop reason: SDUPTHER

## 2019-12-04 ENCOUNTER — OFFICE VISIT (OUTPATIENT)
Dept: FAMILY MEDICINE CLINIC | Age: 39
End: 2019-12-04
Payer: COMMERCIAL

## 2019-12-04 VITALS
DIASTOLIC BLOOD PRESSURE: 98 MMHG | WEIGHT: 315 LBS | OXYGEN SATURATION: 98 % | SYSTOLIC BLOOD PRESSURE: 158 MMHG | RESPIRATION RATE: 18 BRPM | HEART RATE: 105 BPM | BODY MASS INDEX: 46.65 KG/M2 | TEMPERATURE: 96.5 F | HEIGHT: 69 IN

## 2019-12-04 DIAGNOSIS — I10 ESSENTIAL (PRIMARY) HYPERTENSION: ICD-10-CM

## 2019-12-04 DIAGNOSIS — R05.8 COUGH WITH CONGESTION OF PARANASAL SINUS: ICD-10-CM

## 2019-12-04 DIAGNOSIS — R09.81 COUGH WITH CONGESTION OF PARANASAL SINUS: ICD-10-CM

## 2019-12-04 DIAGNOSIS — J01.00 ACUTE NON-RECURRENT MAXILLARY SINUSITIS: Primary | ICD-10-CM

## 2019-12-04 PROCEDURE — G8427 DOCREV CUR MEDS BY ELIG CLIN: HCPCS | Performed by: NURSE PRACTITIONER

## 2019-12-04 PROCEDURE — 96372 THER/PROPH/DIAG INJ SC/IM: CPT | Performed by: NURSE PRACTITIONER

## 2019-12-04 PROCEDURE — G8484 FLU IMMUNIZE NO ADMIN: HCPCS | Performed by: NURSE PRACTITIONER

## 2019-12-04 PROCEDURE — 1036F TOBACCO NON-USER: CPT | Performed by: NURSE PRACTITIONER

## 2019-12-04 PROCEDURE — 99213 OFFICE O/P EST LOW 20 MIN: CPT | Performed by: NURSE PRACTITIONER

## 2019-12-04 PROCEDURE — G8417 CALC BMI ABV UP PARAM F/U: HCPCS | Performed by: NURSE PRACTITIONER

## 2019-12-04 RX ORDER — DEXAMETHASONE SODIUM PHOSPHATE 100 MG/10ML
10 INJECTION INTRAMUSCULAR; INTRAVENOUS ONCE
Status: COMPLETED | OUTPATIENT
Start: 2019-12-04 | End: 2019-12-04

## 2019-12-04 RX ORDER — GUAIFENESIN AND CODEINE PHOSPHATE 100; 10 MG/5ML; MG/5ML
5 SOLUTION ORAL 4 TIMES DAILY PRN
Qty: 120 ML | Refills: 0 | Status: SHIPPED | OUTPATIENT
Start: 2019-12-04 | End: 2019-12-07

## 2019-12-04 RX ORDER — METHYLPREDNISOLONE 4 MG/1
TABLET ORAL
Qty: 1 KIT | Refills: 0 | Status: SHIPPED | OUTPATIENT
Start: 2019-12-04 | End: 2019-12-10

## 2019-12-04 RX ORDER — CEFDINIR 300 MG/1
300 CAPSULE ORAL 2 TIMES DAILY
Qty: 14 CAPSULE | Refills: 0 | Status: SHIPPED | OUTPATIENT
Start: 2019-12-04 | End: 2019-12-11

## 2019-12-04 RX ADMIN — DEXAMETHASONE SODIUM PHOSPHATE 10 MG: 100 INJECTION INTRAMUSCULAR; INTRAVENOUS at 08:17

## 2019-12-04 ASSESSMENT — ENCOUNTER SYMPTOMS
WHEEZING: 0
SORE THROAT: 0
CHEST TIGHTNESS: 0
COUGH: 1
SINUS PRESSURE: 1
SHORTNESS OF BREATH: 0
ABDOMINAL PAIN: 0
VOMITING: 0
NAUSEA: 0
DIARRHEA: 0

## 2019-12-10 ENCOUNTER — OFFICE VISIT (OUTPATIENT)
Dept: NEUROLOGY | Age: 39
End: 2019-12-10
Payer: COMMERCIAL

## 2019-12-10 VITALS
BODY MASS INDEX: 46.65 KG/M2 | RESPIRATION RATE: 20 BRPM | SYSTOLIC BLOOD PRESSURE: 138 MMHG | HEART RATE: 103 BPM | WEIGHT: 315 LBS | DIASTOLIC BLOOD PRESSURE: 88 MMHG | HEIGHT: 69 IN

## 2019-12-10 DIAGNOSIS — G47.419 NARCOLEPSY WITHOUT CATAPLEXY: Primary | ICD-10-CM

## 2019-12-10 DIAGNOSIS — G47.33 OBSTRUCTIVE SLEEP APNEA: ICD-10-CM

## 2019-12-10 PROCEDURE — G8484 FLU IMMUNIZE NO ADMIN: HCPCS | Performed by: PSYCHIATRY & NEUROLOGY

## 2019-12-10 PROCEDURE — G8417 CALC BMI ABV UP PARAM F/U: HCPCS | Performed by: PSYCHIATRY & NEUROLOGY

## 2019-12-10 PROCEDURE — 1036F TOBACCO NON-USER: CPT | Performed by: PSYCHIATRY & NEUROLOGY

## 2019-12-10 PROCEDURE — G8427 DOCREV CUR MEDS BY ELIG CLIN: HCPCS | Performed by: PSYCHIATRY & NEUROLOGY

## 2019-12-10 PROCEDURE — 99213 OFFICE O/P EST LOW 20 MIN: CPT | Performed by: PSYCHIATRY & NEUROLOGY

## 2019-12-27 DIAGNOSIS — G47.419 NARCOLEPSY WITHOUT CATAPLEXY: ICD-10-CM

## 2019-12-28 RX ORDER — DEXTROAMPHETAMINE SACCHARATE, AMPHETAMINE ASPARTATE MONOHYDRATE, DEXTROAMPHETAMINE SULFATE AND AMPHETAMINE SULFATE 7.5; 7.5; 7.5; 7.5 MG/1; MG/1; MG/1; MG/1
30 CAPSULE, EXTENDED RELEASE ORAL 2 TIMES DAILY
Qty: 60 CAPSULE | Refills: 0 | Status: SHIPPED | OUTPATIENT
Start: 2019-12-28 | End: 2020-01-29 | Stop reason: SDUPTHER

## 2020-01-23 ENCOUNTER — OFFICE VISIT (OUTPATIENT)
Dept: FAMILY MEDICINE CLINIC | Age: 40
End: 2020-01-23
Payer: COMMERCIAL

## 2020-01-23 ENCOUNTER — HOSPITAL ENCOUNTER (OUTPATIENT)
Dept: GENERAL RADIOLOGY | Age: 40
Discharge: HOME | End: 2020-01-23
Payer: COMMERCIAL

## 2020-01-23 VITALS
SYSTOLIC BLOOD PRESSURE: 134 MMHG | WEIGHT: 315 LBS | TEMPERATURE: 97.5 F | BODY MASS INDEX: 53.02 KG/M2 | DIASTOLIC BLOOD PRESSURE: 84 MMHG | HEART RATE: 107 BPM | OXYGEN SATURATION: 98 %

## 2020-01-23 PROCEDURE — 71046 X-RAY EXAM CHEST 2 VIEWS: CPT

## 2020-01-23 PROCEDURE — 99213 OFFICE O/P EST LOW 20 MIN: CPT | Performed by: FAMILY MEDICINE

## 2020-01-23 PROCEDURE — G8427 DOCREV CUR MEDS BY ELIG CLIN: HCPCS | Performed by: FAMILY MEDICINE

## 2020-01-23 PROCEDURE — 96372 THER/PROPH/DIAG INJ SC/IM: CPT | Performed by: FAMILY MEDICINE

## 2020-01-23 PROCEDURE — G8417 CALC BMI ABV UP PARAM F/U: HCPCS | Performed by: FAMILY MEDICINE

## 2020-01-23 PROCEDURE — G8484 FLU IMMUNIZE NO ADMIN: HCPCS | Performed by: FAMILY MEDICINE

## 2020-01-23 PROCEDURE — 1036F TOBACCO NON-USER: CPT | Performed by: FAMILY MEDICINE

## 2020-01-23 RX ORDER — CEFDINIR 300 MG/1
300 CAPSULE ORAL 2 TIMES DAILY
Qty: 10 CAPSULE | Refills: 0 | Status: SHIPPED | OUTPATIENT
Start: 2020-01-23 | End: 2020-01-28

## 2020-01-23 RX ORDER — DEXAMETHASONE SODIUM PHOSPHATE 10 MG/ML
10 INJECTION INTRAMUSCULAR; INTRAVENOUS ONCE
Status: COMPLETED | OUTPATIENT
Start: 2020-01-23 | End: 2020-01-23

## 2020-01-23 RX ORDER — ALBUTEROL SULFATE 90 UG/1
2 AEROSOL, METERED RESPIRATORY (INHALATION) EVERY 6 HOURS PRN
Qty: 1 INHALER | Refills: 0 | Status: SHIPPED | OUTPATIENT
Start: 2020-01-23 | End: 2020-02-07

## 2020-01-23 RX ORDER — PREDNISONE 20 MG/1
20 TABLET ORAL DAILY
Qty: 18 TABLET | Refills: 0 | Status: SHIPPED | OUTPATIENT
Start: 2020-01-23 | End: 2020-02-03 | Stop reason: ALTCHOICE

## 2020-01-23 RX ADMIN — DEXAMETHASONE SODIUM PHOSPHATE 10 MG: 10 INJECTION INTRAMUSCULAR; INTRAVENOUS at 10:02

## 2020-01-23 ASSESSMENT — ENCOUNTER SYMPTOMS
ANAL BLEEDING: 0
NAUSEA: 0
SHORTNESS OF BREATH: 1
CHEST TIGHTNESS: 0
CONSTIPATION: 0
COUGH: 1
ABDOMINAL PAIN: 0
DIARRHEA: 0

## 2020-01-23 NOTE — PROGRESS NOTES
Samples of Asmanex were given to the patient, quantity 1, Lot Number [id number]    After obtaining consent, and per orders of Dr. Naila Cheatham, injection of Decadron given in Left deltoid by Ladell Fothergill. Patient instructed to remain in clinic for 20 minutes afterwards, and to report any adverse reaction to me immediately.

## 2020-01-23 NOTE — PROGRESS NOTES
days, 2 tablets for 3 days, then 1 tablet for 3 days. 18 tablet 0    amphetamine-dextroamphetamine (ADDERALL XR) 30 MG extended release capsule Take 1 capsule by mouth 2 times daily for 32 days. 60 capsule 0    losartan (COZAAR) 50 MG tablet Take 1 tablet by mouth daily 90 tablet 3    fluticasone (FLONASE) 50 MCG/ACT nasal spray SPRAY 2 SPRAYS INTO EACH NOSTRIL EVERY DAY (Patient not taking: Reported on 1/23/2020) 3 Bottle 1    albuterol sulfate  (90 Base) MCG/ACT inhaler Inhale 2 puffs into the lungs every 6 hours as needed for Wheezing (Patient not taking: Reported on 1/23/2020) 1 Inhaler 0     No current facility-administered medications for this visit. No Known Allergies    Health Maintenance   Topic Date Due    Diabetic foot exam  07/17/2020 (Originally 11/6/1989)    Diabetic retinal exam  07/17/2020 (Originally 11/6/1989)    HIV screen  07/17/2020 (Originally 11/6/1994)    Diabetic microalbuminuria test  07/28/2020 (Originally 11/6/1997)    Flu vaccine (1) 09/16/2020 (Originally 9/1/2019)    DTaP/Tdap/Td vaccine (1 - Tdap) 01/01/2022 (Originally 11/6/1990)    Lipid screen  05/21/2020    Potassium monitoring  05/21/2020    Creatinine monitoring  05/21/2020    A1C test (Diabetic or Prediabetic)  09/16/2020    Pneumococcal 0-64 years Vaccine  Aged Out       Subjective:      Review of Systems   Constitutional: Negative for chills and fever. HENT: Negative for congestion. Respiratory: Positive for cough and shortness of breath. Negative for chest tightness. Cardiovascular: Negative for chest pain, palpitations and leg swelling. Gastrointestinal: Negative for abdominal pain, anal bleeding, constipation, diarrhea and nausea. Genitourinary: Negative for difficulty urinating. Psychiatric/Behavioral: Negative. SeeHPI for visit specific review of symptoms.   All others negative      Objective:   /84   Pulse 107   Temp 97.5 °F (36.4 °C)   Wt (!) 359 lb (162.8 kg) SpO2 98%   BMI 53.02 kg/m²   Physical Exam  Constitutional:       Appearance: He is well-developed. He is not ill-appearing. Eyes:      Pupils: Pupils are equal, round, and reactive to light. Neck:      Musculoskeletal: Normal range of motion and neck supple. Cardiovascular:      Rate and Rhythm: Normal rate and regular rhythm. Heart sounds: No murmur. No friction rub. Pulmonary:      Effort: Pulmonary effort is normal. No respiratory distress. Breath sounds: Normal breath sounds. No wheezing or rales. Abdominal:      General: Bowel sounds are normal. There is no distension. Palpations: Abdomen is soft. Tenderness: There is no tenderness. There is no guarding or rebound. Neurological:      Mental Status: He is alert. Psychiatric:         Behavior: Behavior normal.           No results found for this or any previous visit (from the past 672 hour(s)). Assessment & Plan: The following diagnoses and conditions are stable with no further orders unless indicated:  1. Acute bronchitis, unspecified organism    - XR CHEST STANDARD (2 VW); Future  - cefdinir (OMNICEF) 300 MG capsule; Take 1 capsule by mouth 2 times daily for 5 days  Dispense: 10 capsule; Refill: 0  - dexamethasone (DECADRON) injection 10 mg  - albuterol sulfate  (90 Base) MCG/ACT inhaler; Inhale 2 puffs into the lungs every 6 hours as needed for Wheezing  Dispense: 1 Inhaler; Refill: 0  - predniSONE (DELTASONE) 20 MG tablet; Take 1 tablet by mouth daily Take 3 tablets for 3 days, 2 tablets for 3 days, then 1 tablet for 3 days. Dispense: 18 tablet; Refill: 0    Patient Instructions   We are committed to providing you with the best care possible. In order to help us achieve these goals please remember to bring all medications, herbal products, and over the counter supplements with you to each visit.      If your provider has ordered testing for you, please be sure to follow up with our office if

## 2020-01-23 NOTE — PATIENT INSTRUCTIONS
We are committed to providing you with the best care possible. In order to help us achieve these goals please remember to bring all medications, herbal products, and over the counter supplements with you to each visit. If your provider has ordered testing for you, please be sure to follow up with our office if you have not received results within 7 days after the testing took place. *If you receive a survey after visiting one of our offices, please take time to share your experience concerning your physician office visit. These surveys are confidential and no health information about you is shared. We are eager to improve for you and we are counting on your feedback to help make that happen.      _______________________________________________________________    1. Received dexamethasone 10mg in the office today  2. Start oral steroids tomorrow (prednisone)  3. Start antibiotic today (cefdinir 300 mg twice a day for 10 days)   4. Albuterol: 2 puffs three times a day as needed. 5. Asmanex 2 puffs twice a day for 30 days.

## 2020-01-28 NOTE — TELEPHONE ENCOUNTER
Requested Prescriptions     Pending Prescriptions Disp Refills    amphetamine-dextroamphetamine (ADDERALL XR) 30 MG extended release capsule 60 capsule 0     Sig: Take 1 capsule by mouth 2 times daily for 32 days.        Last Office Visit:  12/10/2019  Next Office Visit:  6/16/2020  Last Medication Refill:  12/28/2019  Ignacio Celis up to date:  11/2019    *RX updated to reflect   1/28/2020  fill date*

## 2020-01-29 RX ORDER — DEXTROAMPHETAMINE SACCHARATE, AMPHETAMINE ASPARTATE MONOHYDRATE, DEXTROAMPHETAMINE SULFATE AND AMPHETAMINE SULFATE 7.5; 7.5; 7.5; 7.5 MG/1; MG/1; MG/1; MG/1
30 CAPSULE, EXTENDED RELEASE ORAL 2 TIMES DAILY
Qty: 60 CAPSULE | Refills: 0 | Status: SHIPPED | OUTPATIENT
Start: 2020-01-29 | End: 2020-01-30 | Stop reason: SDUPTHER

## 2020-01-31 RX ORDER — DEXTROAMPHETAMINE SACCHARATE, AMPHETAMINE ASPARTATE MONOHYDRATE, DEXTROAMPHETAMINE SULFATE AND AMPHETAMINE SULFATE 7.5; 7.5; 7.5; 7.5 MG/1; MG/1; MG/1; MG/1
30 CAPSULE, EXTENDED RELEASE ORAL 2 TIMES DAILY
Qty: 60 CAPSULE | Refills: 0 | Status: SHIPPED | OUTPATIENT
Start: 2020-01-31 | End: 2020-01-31 | Stop reason: SDUPTHER

## 2020-01-31 RX ORDER — DEXTROAMPHETAMINE SACCHARATE, AMPHETAMINE ASPARTATE MONOHYDRATE, DEXTROAMPHETAMINE SULFATE AND AMPHETAMINE SULFATE 7.5; 7.5; 7.5; 7.5 MG/1; MG/1; MG/1; MG/1
30 CAPSULE, EXTENDED RELEASE ORAL 2 TIMES DAILY
Qty: 60 CAPSULE | Refills: 0 | Status: SHIPPED | OUTPATIENT
Start: 2020-01-31 | End: 2020-03-04 | Stop reason: SDUPTHER

## 2020-01-31 NOTE — TELEPHONE ENCOUNTER
Requested Prescriptions     Pending Prescriptions Disp Refills    amphetamine-dextroamphetamine (ADDERALL XR) 30 MG extended release capsule 60 capsule 0     Sig: Take 1 capsule by mouth 2 times daily for 32 days. Last Office Visit:  12/10/2019  Next Office Visit:  6/16/2020  Last Medication Refill:  1/31/2020  Yaz Glover up to date:  11/2019    *RX updated to reflect   1/31/2020  fill date*      **Dr. Canas Reap patient. Went wrong pharmacy.  I cancelled the other script**

## 2020-02-03 ENCOUNTER — OFFICE VISIT (OUTPATIENT)
Dept: FAMILY MEDICINE CLINIC | Age: 40
End: 2020-02-03
Payer: COMMERCIAL

## 2020-02-03 VITALS
SYSTOLIC BLOOD PRESSURE: 126 MMHG | HEIGHT: 69 IN | DIASTOLIC BLOOD PRESSURE: 74 MMHG | BODY MASS INDEX: 46.65 KG/M2 | HEART RATE: 115 BPM | OXYGEN SATURATION: 95 % | WEIGHT: 315 LBS | TEMPERATURE: 97.7 F

## 2020-02-03 PROCEDURE — 99214 OFFICE O/P EST MOD 30 MIN: CPT | Performed by: INTERNAL MEDICINE

## 2020-02-03 PROCEDURE — G8484 FLU IMMUNIZE NO ADMIN: HCPCS | Performed by: INTERNAL MEDICINE

## 2020-02-03 PROCEDURE — G8417 CALC BMI ABV UP PARAM F/U: HCPCS | Performed by: INTERNAL MEDICINE

## 2020-02-03 PROCEDURE — 94640 AIRWAY INHALATION TREATMENT: CPT | Performed by: INTERNAL MEDICINE

## 2020-02-03 PROCEDURE — 96372 THER/PROPH/DIAG INJ SC/IM: CPT | Performed by: INTERNAL MEDICINE

## 2020-02-03 PROCEDURE — G8427 DOCREV CUR MEDS BY ELIG CLIN: HCPCS | Performed by: INTERNAL MEDICINE

## 2020-02-03 PROCEDURE — 1036F TOBACCO NON-USER: CPT | Performed by: INTERNAL MEDICINE

## 2020-02-03 RX ORDER — IPRATROPIUM BROMIDE AND ALBUTEROL SULFATE 2.5; .5 MG/3ML; MG/3ML
1 SOLUTION RESPIRATORY (INHALATION) ONCE
Status: COMPLETED | OUTPATIENT
Start: 2020-02-03 | End: 2020-02-03

## 2020-02-03 RX ORDER — AMOXICILLIN AND CLAVULANATE POTASSIUM 875; 125 MG/1; MG/1
1 TABLET, FILM COATED ORAL 2 TIMES DAILY
Qty: 20 TABLET | Refills: 0 | Status: SHIPPED | OUTPATIENT
Start: 2020-02-03 | End: 2020-02-07 | Stop reason: ALTCHOICE

## 2020-02-03 RX ORDER — METHYLPREDNISOLONE ACETATE 80 MG/ML
80 INJECTION, SUSPENSION INTRA-ARTICULAR; INTRALESIONAL; INTRAMUSCULAR; SOFT TISSUE ONCE
Status: COMPLETED | OUTPATIENT
Start: 2020-02-03 | End: 2020-02-03

## 2020-02-03 RX ORDER — HYDROCODONE POLISTIREX AND CHLORPHENIRAMINE POLISTIREX 10; 8 MG/5ML; MG/5ML
5 SUSPENSION, EXTENDED RELEASE ORAL EVERY 12 HOURS PRN
Qty: 115 ML | Refills: 0 | Status: SHIPPED | OUTPATIENT
Start: 2020-02-03 | End: 2020-02-04 | Stop reason: SDUPTHER

## 2020-02-03 RX ORDER — ALBUTEROL SULFATE 2.5 MG/3ML
2.5 SOLUTION RESPIRATORY (INHALATION) EVERY 4 HOURS PRN
Qty: 60 VIAL | Refills: 1 | Status: SHIPPED | OUTPATIENT
Start: 2020-02-03 | End: 2020-02-24 | Stop reason: ALTCHOICE

## 2020-02-03 RX ORDER — BENZONATATE 100 MG/1
200 CAPSULE ORAL 3 TIMES DAILY PRN
Qty: 60 CAPSULE | Refills: 1 | Status: SHIPPED | OUTPATIENT
Start: 2020-02-03 | End: 2020-02-10

## 2020-02-03 RX ORDER — CEFTRIAXONE 500 MG/1
1000 INJECTION, POWDER, FOR SOLUTION INTRAMUSCULAR; INTRAVENOUS ONCE
Status: COMPLETED | OUTPATIENT
Start: 2020-02-03 | End: 2020-02-03

## 2020-02-03 RX ORDER — GUAIFENESIN AND DEXTROMETHORPHAN HYDROBROMIDE 600; 30 MG/1; MG/1
1 TABLET, EXTENDED RELEASE ORAL EVERY 12 HOURS PRN
Qty: 28 TABLET | Refills: 0 | COMMUNITY
Start: 2020-02-03 | End: 2020-02-24

## 2020-02-03 RX ORDER — PREDNISONE 20 MG/1
TABLET ORAL
Qty: 20 TABLET | Refills: 0 | Status: SHIPPED | OUTPATIENT
Start: 2020-02-03 | End: 2020-02-11

## 2020-02-03 RX ADMIN — CEFTRIAXONE 1000 MG: 500 INJECTION, POWDER, FOR SOLUTION INTRAMUSCULAR; INTRAVENOUS at 17:46

## 2020-02-03 RX ADMIN — METHYLPREDNISOLONE ACETATE 80 MG: 80 INJECTION, SUSPENSION INTRA-ARTICULAR; INTRALESIONAL; INTRAMUSCULAR; SOFT TISSUE at 17:47

## 2020-02-03 RX ADMIN — IPRATROPIUM BROMIDE AND ALBUTEROL SULFATE 1 AMPULE: 2.5; .5 SOLUTION RESPIRATORY (INHALATION) at 17:48

## 2020-02-03 ASSESSMENT — PATIENT HEALTH QUESTIONNAIRE - PHQ9
SUM OF ALL RESPONSES TO PHQ9 QUESTIONS 1 & 2: 0
SUM OF ALL RESPONSES TO PHQ QUESTIONS 1-9: 0
2. FEELING DOWN, DEPRESSED OR HOPELESS: 0
1. LITTLE INTEREST OR PLEASURE IN DOING THINGS: 0
SUM OF ALL RESPONSES TO PHQ QUESTIONS 1-9: 0

## 2020-02-03 ASSESSMENT — ENCOUNTER SYMPTOMS
SORE THROAT: 0
EYE REDNESS: 0
COUGH: 1
SINUS PAIN: 0
COLOR CHANGE: 0
WHEEZING: 1
BLOOD IN STOOL: 0
VOMITING: 0
CHEST TIGHTNESS: 0
RHINORRHEA: 0
DIARRHEA: 0
EYE PAIN: 0
SINUS PRESSURE: 1
ABDOMINAL PAIN: 0
SHORTNESS OF BREATH: 1
VOICE CHANGE: 0
EYE DISCHARGE: 0

## 2020-02-03 NOTE — PROGRESS NOTES
Abhinav Lucia is a 35 y.o. male who presents today for   Chief Complaint   Patient presents with    Congestion    Cough       HPI  36 y/o WM here for c/o persistent cough, congestion, and wheezing despite seeing Dr Sheila Mclean last week on 1/23/20 and given dexamethasone 10 mg IM, 9 days of prednisone (finished 3 days), and 10 days of cefdinir. CXR was negative last week when he saw Dr Sheila Mclean. He was given an albuterol HFA to use as needed with last dose this morning. He does not feel the inhaler is working very well because he still has severe cough. Patient also taking OTC Dayquil without much relief from cough. He is now very hoarse which started about 3 days ago but he is not sure if he has ha fever. He is having a lot of ear pain and pressure and says that it took his ears \"4 days to pop\" after his flight and he still cannot hear out of his ears. 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 He has no hx of asthma or smoking. He says he cannot call into work tomorrow to take off. Review of Systems   Constitutional: Positive for diaphoresis. Negative for appetite change, chills, fatigue and fever. HENT: Positive for congestion, ear pain, postnasal drip and sinus pressure. Negative for ear discharge, rhinorrhea, sinus pain, sore throat and voice change. See HPI   Eyes: Negative for pain, discharge and redness. Respiratory: Positive for cough, shortness of breath and wheezing. Negative for chest tightness. See HPI   Cardiovascular: Negative for chest pain and palpitations. Gastrointestinal: Negative for abdominal pain, blood in stool, diarrhea and vomiting. Endocrine: Negative for cold intolerance, heat intolerance and polydipsia. Genitourinary: Negative for dysuria and hematuria. Musculoskeletal: Negative for arthralgias, myalgias, neck pain and neck stiffness. Skin: Negative for color change and rash. Neurological: Negative for dizziness, tremors, syncope, speech difficulty, weakness, numbness and headaches. Hematological: Negative for adenopathy. Does not bruise/bleed easily. Psychiatric/Behavioral: Negative for confusion, dysphoric mood and sleep disturbance. The patient is not nervous/anxious. All other systems reviewed and are negative. 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)     Obstructive sleep apnea     AHI:  29.0 per PSG, 2012       Current Outpatient Medications   Medication Sig Dispense Refill    amoxicillin-clavulanate (AUGMENTIN) 875-125 MG per tablet Take 1 tablet by mouth 2 times daily for 10 days 20 tablet 0    hydrocodone-chlorpheniramine (TUSSIONEX PENNKINETIC ER) 10-8 MG/5ML SUER Take 5 mLs by mouth every 12 hours as needed (cough) for up to 10 days. 115 mL 0    albuterol (PROVENTIL) (2.5 MG/3ML) 0.083% nebulizer solution Take 3 mLs by nebulization every 4 hours as needed for Wheezing or Shortness of Breath 60 vial 1    benzonatate (TESSALON) 100 MG capsule Take 2 capsules by mouth 3 times daily as needed for Cough 60 capsule 1    Dextromethorphan-guaiFENesin (MUCINEX DM)  MG TB12 Take 1 tablet by mouth every 12 hours as needed (cough/congestion) 28 tablet 0    predniSONE (DELTASONE) 20 MG tablet Take 1 tablet by mouth 2 times daily for 5 days, THEN 0.5 tablets 2 times daily for 3 days. 20 tablet 0    amphetamine-dextroamphetamine (ADDERALL XR) 30 MG extended release capsule Take 1 capsule by mouth 2 times daily for 32 days.  60 capsule 0    albuterol sulfate  (90 Base) MCG/ACT inhaler Inhale 2 puffs into the lungs every 6 hours as needed for Wheezing 1 Inhaler 0    fluticasone (FLONASE) 50 MCG/ACT nasal spray SPRAY 2 SPRAYS INTO [facility] NOSTRIL EVERY DAY 3 Bottle 1    losartan (COZAAR) 50 MG tablet Take 1 tablet by mouth daily 90 tablet 3    albuterol sulfate  (90 Base) MCG/ACT inhaler Inhale 2 puffs into the lungs every 6 hours as needed for Wheezing 1 Inhaler 0     No current facility-administered medications for this visit. No Known Allergies    History reviewed. No pertinent surgical history. Social History     Tobacco Use    Smoking status: Never Smoker    Smokeless tobacco: Never Used   Substance Use Topics    Alcohol use: Yes     Comment: twice a year social    Drug use: No       Family History   Problem Relation Age of Onset    Diabetes Mother     Hypertension Mother     Asthma Mother     Coronary Art Dis Father     Heart Attack Father     Hypertension Father     Cancer Maternal Grandmother         kidney    Stroke Paternal Grandfather     Cancer Paternal Grandfather         lung       /74   Pulse 115   Temp 97.7 °F (36.5 °C)   Ht 5' 9\" (1.753 m)   Wt (!) 363 lb 9 oz (164.9 kg)   SpO2 95%   BMI 53.69 kg/m²     Physical Exam  Vitals signs reviewed. Constitutional:       General: He is awake. He is not in acute distress. Appearance: He is well-groomed. He is morbidly obese. He is ill-appearing and diaphoretic. He is not toxic-appearing. HENT:      Head: Normocephalic and atraumatic. Right Ear: External ear normal. A middle ear effusion is present. Tympanic membrane is erythematous and retracted. Left Ear: External ear normal. A middle ear effusion is present. Tympanic membrane is erythematous and retracted. Nose: Congestion present. No rhinorrhea. Right Sinus: No maxillary sinus tenderness or frontal sinus tenderness. Left Sinus: No maxillary sinus tenderness or frontal sinus tenderness. Mouth/Throat:      Mouth: Mucous membranes are moist.      Tongue: No lesions. Palate: No lesions.       Pharynx: Pharyngeal swelling and posterior oropharyngeal erythema present. No oropharyngeal exudate. Tonsils: No tonsillar exudate. Comments: +hoarse voice. +moderate tonsillar and OP erythema with mild swelling but airway does not appear to be obstructed on exam today. Eyes:      General:         Right eye: No discharge. Left eye: No discharge. Conjunctiva/sclera: Conjunctivae normal.      Pupils: Pupils are equal, round, and reactive to light. Neck:      Musculoskeletal: Normal range of motion and neck supple. No neck rigidity. Thyroid: No thyromegaly. Vascular: No carotid bruit or JVD. Trachea: Trachea normal. No tracheal tenderness. Cardiovascular:      Rate and Rhythm: Normal rate and regular rhythm. Pulses:           Carotid pulses are 2+ on the right side and 2+ on the left side. Posterior tibial pulses are 2+ on the right side and 2+ on the left side. Heart sounds: Normal heart sounds. No murmur. No friction rub. No gallop. Pulmonary:      Effort: Pulmonary effort is normal. No accessory muscle usage. Breath sounds: Decreased air movement present. No stridor. Examination of the right-middle field reveals decreased breath sounds. Examination of the left-middle field reveals decreased breath sounds. Examination of the right-lower field reveals decreased breath sounds. Examination of the left-lower field reveals decreased breath sounds. Decreased breath sounds present. No wheezing or rhonchi. Comments: +frequent cough causing difficulty for patient to take a deep breath for exam  Abdominal:      General: Abdomen is flat. Bowel sounds are normal. There is no distension. Palpations: Abdomen is soft. There is no hepatomegaly or splenomegaly. Tenderness: There is no abdominal tenderness. Comments: Exam limited by morbid obesity   Musculoskeletal:         General: No tenderness.       Right wrist: Normal.      Left wrist: Normal.      Right ankle: Normal.      Left ankle: Normal. Lymphadenopathy:      Cervical: No cervical adenopathy. Upper Body:      Right upper body: No supraclavicular adenopathy. Left upper body: No supraclavicular adenopathy. Skin:     General: Skin is warm. Capillary Refill: Capillary refill takes less than 2 seconds. Findings: No rash. Nails: There is no clubbing. Neurological:      Mental Status: He is alert and oriented to person, place, and time. Motor: No tremor or abnormal muscle tone. Coordination: Coordination normal.      Comments: CN II-XII grossly intact, speech clear, no facial droop, MAEW   Psychiatric:         Speech: Speech normal.         Behavior: Behavior normal. Behavior is cooperative. Thought Content: Thought content normal.         Judgment: Judgment normal.       Addendum:  Significant improvement in cough and air exchange after duoneb with no wheezing audible     Assessment:    ICD-10-CM    1. Acute bronchitis with bronchospasm J20.9 ipratropium-albuterol (DUONEB) nebulizer solution 1 ampule     methylPREDNISolone acetate (DEPO-MEDROL) injection 80 mg     cefTRIAXone (ROCEPHIN) injection 1,000 mg     amoxicillin-clavulanate (AUGMENTIN) 875-125 MG per tablet     albuterol (PROVENTIL) (2.5 MG/3ML) 0.083% nebulizer solution     predniSONE (DELTASONE) 20 MG tablet   2. Bilateral otitis media with effusion H65.93 methylPREDNISolone acetate (DEPO-MEDROL) injection 80 mg     cefTRIAXone (ROCEPHIN) injection 1,000 mg     amoxicillin-clavulanate (AUGMENTIN) 875-125 MG per tablet   3. Cough R05 hydrocodone-chlorpheniramine (TUSSIONEX PENNKINETIC ER) 10-8 MG/5ML SUER     benzonatate (TESSALON) 100 MG capsule     Dextromethorphan-guaiFENesin (MUCINEX DM)  MG TB12     predniSONE (DELTASONE) 20 MG tablet   4. Shortness of breath R06.02 albuterol (PROVENTIL) (2.5 MG/3ML) 0.083% nebulizer solution     predniSONE (DELTASONE) 20 MG tablet   5.  Morbid obesity with body mass index (BMI) of 50.0 to 59.9 in ([facility].)    1. Acute bronchitis with bronchospasm-no better after course of oral cefdinir, DXM 10 mg, and prednisone taper prescribed on 1/23/20 by Dr SAMM Greenbergsh  -rocephin 1 gm IM and 10 days of oral augmentin for bronchitis and BOM   -depomedrol 80 mg IM and additional prednisone taper due to persistence of symptoms which are moderate to help with sinus and airway inflammation  -tussionex DM every 12 hours prn for severe cough, warned of risk of sedation, dizziness, and constipation with medication but patient has tolerated well in the past  -tessalon perles and OTC cough/cold medication as needed for mild to moderate cough to use with Mucinex DM during the day  -recommend using albuterol nebulization treatment for moderate to severe cough/SOA and then albuterol HFA when not at home until symptoms improve  -follow up if symptoms worsen or fail to improve in next 5-7 days with above treatment  2. BOM-rocephin 1 gm IM today in office then 10 days of augmentin  3. ER if symptoms worsen or if persistent fever develops  4. Recheck in office in 3-5 days      No orders of the defined types were placed in this encounter. Orders Placed This Encounter   Medications    ipratropium-albuterol (DUONEB) nebulizer solution 1 ampule    methylPREDNISolone acetate (DEPO-MEDROL) injection 80 mg    cefTRIAXone (ROCEPHIN) injection 1,000 mg    amoxicillin-clavulanate (AUGMENTIN) 875-125 MG per tablet     Sig: Take 1 tablet by mouth 2 times daily for 10 days     Dispense:  20 tablet     Refill:  0    hydrocodone-chlorpheniramine (TUSSIONEX PENNKINETIC ER) 10-8 MG/5ML SUER     Sig: Take 5 mLs by mouth every 12 hours as needed (cough) for up to 10 days.      Dispense:  115 mL     Refill:  0     Reduce doses taken as pain becomes manageable    albuterol (PROVENTIL) (2.5 MG/3ML) 0.083% nebulizer solution     Sig: Take 3 mLs by nebulization every 4 hours as needed for Wheezing or Shortness of Breath     Dispense:  60 vial     Refill:  1 understand these instructions. Do not allow a young child to use albuterol inhalation without help from an adult. To prevent exercise-induced bronchospasm, use this medicine 15 to 30 minutes before you exercise. The effects of albuterol inhalation should last about 4 to 6 hours. Seek medical attention if your breathing problems get worse quickly, or if you think your asthma medications are not working as well. Do not try to clean or take apart the ProAir RespiClick inhaler device. Always use the new inhaler device provided with your refill. Do not float a medicine canister in water to see if it is empty. Your dose needs may change due to surgery, illness, stress, or a recent asthma attack. Do not change your dose or dosing schedule without your doctor's advice. Store at room temperature away from moisture, heat, or cold temperatures. Keep the cover on your ProAir RespiClick inhaler when not in use. Store Proventil or Ventolin with the mouthpiece down. Keep the inhaler canister away from open flame or high heat. The canister may explode if it gets too hot. Do not puncture or burn an empty inhaler canister. What happens if I miss a dose? Use the medicine as soon as you can, but skip the missed dose if it is almost time for your next dose. Do not use two doses at one time. Get your prescription refilled before you run out of medicine completely. What happens if I overdose? Seek emergency medical attention or call the Poison Help line at 1-958.647.9477. An overdose of albuterol can be fatal.  Overdose symptoms may include dry mouth, tremors, chest pain, fast heartbeats, nausea, general ill feeling, seizure (convulsions), feeling light-headed or fainting. What should I avoid while using albuterol inhalation? Rinse with water if this medicine gets in your eyes. What are the possible side effects of albuterol inhalation?   Get emergency medical help if you have signs of an allergic reaction: hives; difficult breathing; swelling of your face, lips, tongue, or throat. Call your doctor at once if you have:  · wheezing, choking, or other breathing problems after using this medicine;  · chest pain, fast heart rate, pounding heartbeats or fluttering in your chest;  · severe headache, pounding in your neck or ears;  · pain or burning when you urinate;  · high blood sugar --increased thirst, increased urination, dry mouth, fruity breath odor; or  · low potassium --leg cramps, constipation, irregular heartbeats, increased thirst or urination, numbness or tingling, muscle weakness or limp feeling. Common side effects may include:  · chest pain, fast or pounding heartbeats;  · dizziness;  · feeling shaky or nervous;  · headache, back pain, body aches;  · upset stomach; or  · sore throat, sinus pain, runny or stuffy nose. This is not a complete list of side effects and others may occur. Call your doctor for medical advice about side effects. You may report side effects to FDA at 2-210-FDA-3203. What other drugs will affect albuterol inhalation? Tell your doctor about all your other medicines, especially:  · any other inhaled medicines or bronchodilators;  · digoxin;  · a diuretic or \"water pill\";  · an antidepressant --amitriptyline, desipramine, imipramine, doxepin, nortriptyline, and others;  · a beta blocker --atenolol, carvedilol, labetalol, metoprolol, propranolol, sotalol, and others; or  · an MAO inhibitor --isocarboxazid, linezolid, methylene blue injection, phenelzine, rasagiline, selegiline, tranylcypromine, and others. This list is not complete. Other drugs may affect albuterol inhalation, including prescription and over-the-counter medicines, vitamins, and herbal products. Not all possible drug interactions are listed here. Where can I get more information? Your pharmacist can provide more information about albuterol inhalation.   Remember, keep this and all other medicines out of the reach of children, never share your medicines with others, and use this medication only for the indication prescribed. Every effort has been made to ensure that the information provided by [address] is accurate, up-to-date, and complete, but no guarantee is made to that effect. Drug information contained herein may be time sensitive. [facility] information has been compiled for use by healthcare practitioners and consumers in the [place] and therefore [facility] does not warrant that uses outside of the [place] are appropriate, unless specifically indicated otherwise. [facility]'s drug information does not endorse drugs, diagnose patients or recommend therapy. [facility]'s drug information is an informational resource designed to assist licensed healthcare practitioners in caring for their patients and/or to serve consumers viewing this service as a supplement to, and not a substitute for, the expertise, skill, knowledge and judgment of healthcare practitioners. The absence of a warning for a given drug or drug combination in no way should be construed to indicate that the drug or drug combination is safe, effective or appropriate for any given patient. [facility] does not assume any responsibility for any aspect of healthcare administered with the aid of information [facility] provides. The information contained herein is not intended to cover all possible uses, directions, precautions, warnings, drug interactions, allergic reactions, or adverse effects. If you have questions about the drugs you are taking, check with your doctor, nurse or pharmacist.  Copyright 4477-5458 [address]. Version: 8.03. Revision date: 3/15/2019. Care instructions adapted under license by [facility] ([facility]). If you have questions about a medical condition or this instruction, always ask your healthcare professional. [place] [address] any warranty or liability for your use of this information.        Patient Education Shortness of Breath: Care Instructions  Your Care Instructions  Shortness of breath has many causes. Sometimes conditions such as anxiety can lead to shortness of breath. Some people get mild shortness of breath when they exercise. Trouble breathing also can be a symptom of a serious problem, such as asthma, lung disease, emphysema, heart problems, and pneumonia. If your shortness of breath continues, you may need tests and treatment. Watch for any changes in your breathing and other symptoms. Follow-up care is a key part of your treatment and safety. Be sure to make and go to all appointments, and call your doctor if you are having problems. It's also a good idea to know your test results and keep a list of the medicines you take. How can you care for yourself at home? · Do not smoke or allow others to smoke around you. If you need help quitting, talk to your doctor about stop-smoking programs and medicines. These can increase your chances of quitting for good. · Get plenty of rest and sleep. · Take your medicines exactly as prescribed. Call your doctor if you think you are having a problem with your medicine. · Find healthy ways to deal with stress. ? Exercise daily. ? Get plenty of sleep. ? Eat regularly and well. When should you call for help? Call 911 anytime you think you may need emergency care. For example, call if:    · You have severe shortness of breath.     · You have symptoms of a heart attack. These may include:  ? Chest pain or pressure, or a strange feeling in the chest.  ? Sweating. ? Shortness of breath. ? Nausea or vomiting. ? Pain, pressure, or a strange feeling in the back, neck, jaw, or upper belly or in one or both shoulders or arms. ? Lightheadedness or sudden weakness. ? A fast or irregular heartbeat. After you call  911, the  may tell you to chew 1 adult-strength or 2 to 4 low-dose aspirin. Wait for an ambulance.  Do not try to drive yourself.    Call your doctor now or seek immediate medical care if:    · Your shortness of breath gets worse or you start to wheeze. Wheezing is a high-pitched sound when you breathe.     · You wake up at night out of breath or have to prop your head up on several pillows to breathe.     · You are short of breath after only light activity or while at rest.    Watch closely for changes in your health, and be sure to contact your doctor if:    · You do not get better over the next 1 to 2 days. Where can you learn more? Go to https://MapMyIDpepiceweb.Amura. org and sign in to your [employer] account. Enter S780 in the [employer] box to learn more about \"Shortness of Breath: Care Instructions. \"     If you do not have an account, please click on the \"Sign Up Now\" link. Current as of: June 9, 2019  Content Version: 12.3  © 1306-7667 [employer]. Care instructions adapted under license by [facility] ([facility]). If you have questions about a medical condition or this instruction, always ask your healthcare professional. [place] [address] any warranty or liability for your use of this information. Patient Education        Ear Infection (Otitis Media): Care Instructions  Your Care Instructions    An ear infection may start with a cold and affect the middle ear (otitis media). It can hurt a lot. Most ear infections clear up on their own in a couple of days. Most often you will not need antibiotics. This is because many ear infections are caused by a virus. Antibiotics don't work against a virus. Regular doses of pain medicines are the best way to reduce your fever and help you feel better. Follow-up care is a key part of your treatment and safety. Be sure to make and go to all appointments, and call your doctor if you are having problems. It's also a good idea to know your test results and keep a list of the medicines you take. How can you care for yourself at home?   · Take pain medicines exactly as directed. ? If the doctor gave you a prescription medicine for pain, take it as prescribed. ? If you are not taking a prescription pain medicine, take an over-the-counter medicine, such as acetaminophen (Tylenol), ibuprofen (Advil, Motrin), or naproxen (Aleve). Read and follow all instructions on the label. ? Do not take two or more pain medicines at the same time unless the doctor told you to. Many pain medicines have acetaminophen, which is Tylenol. Too much acetaminophen (Tylenol) can be harmful. · Plan to take a full dose of pain reliever before bedtime. Getting enough sleep will help you get better. · Try a warm, moist washcloth on the ear. It may help relieve pain. · If your doctor prescribed antibiotics, take them as directed. Do not stop taking them just because you feel better. You need to take the full course of antibiotics. When should you call for help? Call your doctor now or seek immediate medical care if:    · You have new or increasing ear pain.     · You have new or increasing pus or blood draining from your ear.     · You have a fever with a stiff neck or a severe headache.    Watch closely for changes in your health, and be sure to contact your doctor if:    · You have new or worse symptoms.     · You are not getting better after taking an antibiotic for 2 days. Where can you learn more? Go to https://Do IT developerspeFeedMagnet.CloudFX. org and sign in to your [employer] account. Enter K348 in the [facility] box to learn more about \"Ear Infection (Otitis Media): Care Instructions. \"     If you do not have an account, please click on the \"Sign Up Now\" link. Current as of: July 28, 2019  Content Version: 12.3  © 3393-2820 [employer], [employer]. Care instructions adapted under license by [facility] ([facility]).  If you have questions about a medical condition or this instruction, always ask your healthcare professional. [address]  any warranty or liability for your use

## 2020-02-03 NOTE — PATIENT INSTRUCTIONS
recommend therapy. [facility] drug information is an informational resource designed to assist licensed healthcare practitioners in caring for their patients and/or to serve consumers viewing this service as a supplement to, and not a substitute for, the expertise, skill, knowledge and judgment of healthcare practitioners. The absence of a warning for a given drug or drug combination in no way should be construed to indicate that the drug or drug combination is safe, effective or appropriate for any given patient. [facility] does not assume any responsibility for any aspect of healthcare administered with the aid of information [facility] provides. The information contained herein is not intended to cover all possible uses, directions, precautions, warnings, drug interactions, allergic reactions, or adverse effects. If you have questions about the drugs you are taking, check with your doctor, nurse or pharmacist.  Copyright 0491-7773 [address]. Version: 8.03. Revision date: 3/15/2019. Care instructions adapted under license by [facility] ([facility]). If you have questions about a medical condition or this instruction, always ask your healthcare professional. [place] [address] any warranty or liability for your use of this information. Patient Education        Shortness of Breath: Care Instructions  Your Care Instructions  Shortness of breath has many causes. Sometimes conditions such as anxiety can lead to shortness of breath. Some people get mild shortness of breath when they exercise. Trouble breathing also can be a symptom of a serious problem, such as asthma, lung disease, emphysema, heart problems, and pneumonia. If your shortness of breath continues, you may need tests and treatment. Watch for any changes in your breathing and other symptoms. Follow-up care is a key part of your treatment and safety. Be sure to make and go to all appointments, and call your doctor if you are having problems. It's also a good idea to know your test results and keep a list of the medicines you take. How can you care for yourself at home? · Do not smoke or allow others to smoke around you. If you need help quitting, talk to your doctor about stop-smoking programs and medicines. These can increase your chances of quitting for good. · Get plenty of rest and sleep. · Take your medicines exactly as prescribed. Call your doctor if you think you are having a problem with your medicine. · Find healthy ways to deal with stress. ? Exercise daily. ? Get plenty of sleep. ? Eat regularly and well. When should you call for help? Call 911 anytime you think you may need emergency care. For example, call if:    · You have severe shortness of breath.     · You have symptoms of a heart attack. These may include:  ? Chest pain or pressure, or a strange feeling in the chest.  ? Sweating. ? Shortness of breath. ? Nausea or vomiting. ? Pain, pressure, or a strange feeling in the back, neck, jaw, or upper belly or in one or both shoulders or arms. ? Lightheadedness or sudden weakness. ? A fast or irregular heartbeat. After you call  911, the  may tell you to chew 1 adult-strength or 2 to 4 low-dose aspirin. Wait for an ambulance. Do not try to drive yourself.    Call your doctor now or seek immediate medical care if:    · Your shortness of breath gets worse or you start to wheeze. Wheezing is a high-pitched sound when you breathe.     · You wake up at night out of breath or have to prop your head up on several pillows to breathe.     · You are short of breath after only light activity or while at rest.    Watch closely for changes in your health, and be sure to contact your doctor if:    · You do not get better over the next 1 to 2 days. Where can you learn more? Go to https://adri.[employer] org and sign in to your [employer] account.  Enter H373 in the Mochila box to learn more about ear. It may help relieve pain. · If your doctor prescribed antibiotics, take them as directed. Do not stop taking them just because you feel better. You need to take the full course of antibiotics. When should you call for help? Call your doctor now or seek immediate medical care if:    · You have new or increasing ear pain.     · You have new or increasing pus or blood draining from your ear.     · You have a fever with a stiff neck or a severe headache.    Watch closely for changes in your health, and be sure to contact your doctor if:    · You have new or worse symptoms.     · You are not getting better after taking an antibiotic for 2 days. Where can you learn more? Go to https://zipcodemailer.com.[employer]. org and sign in to your [facility] account. Enter N497 in the [employer] box to learn more about \"Ear Infection (Otitis Media): Care Instructions. \"     If you do not have an account, please click on the \"Sign Up Now\" link. Current as of: July 28, 2019  Content Version: 12.3  © 4793-8200 [employer]. Care instructions adapted under license by [facility] ([facility]). If you have questions about a medical condition or this instruction, always ask your healthcare professional. [place] [address] any warranty or liability for your use of this information.

## 2020-02-04 RX ORDER — HYDROCODONE POLISTIREX AND CHLORPHENIRAMINE POLISTIREX 10; 8 MG/5ML; MG/5ML
5 SUSPENSION, EXTENDED RELEASE ORAL EVERY 12 HOURS PRN
Qty: 115 ML | Refills: 0 | Status: SHIPPED | OUTPATIENT
Start: 2020-02-04 | End: 2020-02-14

## 2020-02-07 ENCOUNTER — OFFICE VISIT (OUTPATIENT)
Dept: FAMILY MEDICINE CLINIC | Age: 40
End: 2020-02-07
Payer: COMMERCIAL

## 2020-02-07 ENCOUNTER — HOSPITAL ENCOUNTER (OUTPATIENT)
Dept: GENERAL RADIOLOGY | Age: 40
Discharge: HOME | End: 2020-02-07
Payer: COMMERCIAL

## 2020-02-07 VITALS
RESPIRATION RATE: 20 BRPM | SYSTOLIC BLOOD PRESSURE: 122 MMHG | DIASTOLIC BLOOD PRESSURE: 82 MMHG | HEIGHT: 69 IN | OXYGEN SATURATION: 97 % | BODY MASS INDEX: 46.65 KG/M2 | HEART RATE: 120 BPM | TEMPERATURE: 97.2 F | WEIGHT: 315 LBS

## 2020-02-07 LAB
INFLUENZA A ANTIBODY: ABNORMAL
INFLUENZA B ANTIBODY: ABNORMAL

## 2020-02-07 PROCEDURE — G8484 FLU IMMUNIZE NO ADMIN: HCPCS | Performed by: NURSE PRACTITIONER

## 2020-02-07 PROCEDURE — 87804 INFLUENZA ASSAY W/OPTIC: CPT | Performed by: NURSE PRACTITIONER

## 2020-02-07 PROCEDURE — 1036F TOBACCO NON-USER: CPT | Performed by: NURSE PRACTITIONER

## 2020-02-07 PROCEDURE — 99214 OFFICE O/P EST MOD 30 MIN: CPT | Performed by: NURSE PRACTITIONER

## 2020-02-07 PROCEDURE — G8417 CALC BMI ABV UP PARAM F/U: HCPCS | Performed by: NURSE PRACTITIONER

## 2020-02-07 PROCEDURE — 71046 X-RAY EXAM CHEST 2 VIEWS: CPT

## 2020-02-07 PROCEDURE — 96372 THER/PROPH/DIAG INJ SC/IM: CPT | Performed by: NURSE PRACTITIONER

## 2020-02-07 PROCEDURE — G8427 DOCREV CUR MEDS BY ELIG CLIN: HCPCS | Performed by: NURSE PRACTITIONER

## 2020-02-07 RX ORDER — LEVOFLOXACIN 500 MG/1
500 TABLET, FILM COATED ORAL DAILY
Qty: 10 TABLET | Refills: 0 | Status: SHIPPED | OUTPATIENT
Start: 2020-02-07 | End: 2020-02-17

## 2020-02-07 RX ORDER — IPRATROPIUM BROMIDE AND ALBUTEROL SULFATE 2.5; .5 MG/3ML; MG/3ML
SOLUTION RESPIRATORY (INHALATION)
Qty: 360 ML | Refills: 1 | Status: SHIPPED | OUTPATIENT
Start: 2020-02-07 | End: 2020-02-24

## 2020-02-07 RX ORDER — OSELTAMIVIR PHOSPHATE 75 MG/1
75 CAPSULE ORAL 2 TIMES DAILY
Qty: 10 CAPSULE | Refills: 0 | Status: SHIPPED | OUTPATIENT
Start: 2020-02-07 | End: 2020-02-12 | Stop reason: ALTCHOICE

## 2020-02-07 RX ORDER — DEXAMETHASONE SODIUM PHOSPHATE 100 MG/10ML
10 INJECTION INTRAMUSCULAR; INTRAVENOUS ONCE
Status: COMPLETED | OUTPATIENT
Start: 2020-02-07 | End: 2020-02-07

## 2020-02-07 RX ADMIN — DEXAMETHASONE SODIUM PHOSPHATE 10 MG: 100 INJECTION INTRAMUSCULAR; INTRAVENOUS at 10:13

## 2020-02-07 ASSESSMENT — ENCOUNTER SYMPTOMS
DIARRHEA: 0
CHEST TIGHTNESS: 0
SHORTNESS OF BREATH: 0
COUGH: 1
SORE THROAT: 0
ABDOMINAL PAIN: 0
VOMITING: 0
NAUSEA: 0
WHEEZING: 1

## 2020-02-07 NOTE — PROGRESS NOTES
After obtaining consent, and per orders of Peggy BENNETT  injection of Dexamethasone 10 was given in theRight upper quad. gluteus. No adverse reaction noted. Well tolerated by patient. . Given By Gillian Siddiqui

## 2020-02-07 NOTE — PROGRESS NOTES
Megan Benz is a 35 y.o. male who presents today for  Chief Complaint   Patient presents with    Cough     not feeling better    Congestion       HPI:  Here for f/u on bronchitis, persistent cough. He was seen on 1/23 per Dr. Prerna Meng, cough on going about a month. CXR negative. Treated with cefdinir, dexamethasone injection, prednisone taper. Also given sample of Asmanex. Symptoms continue to persist and worsen. He was seen by Dr. Felix Kaye 4 days ago, received Rocephin injection, started Augmentin. Had Depo-Medrol injection. Given another prednisone taper. Tussionex given for cough, helps slightly. He does not feel like he is improving, had chills and sweating last night, no known fever. No significant body aches. Currently on day 4/10 of Augmentin. Using Asmanex 2 puffs twice daily, albuterol neb 2-3 times a day. Blood pressure is stable/controlled on current medication. Review of Systems   Constitutional: Positive for chills. Negative for fever. HENT: Positive for congestion. Negative for ear pain and sore throat. Respiratory: Positive for cough and wheezing. Negative for chest tightness and shortness of breath. Cardiovascular: Negative for chest pain. Gastrointestinal: Negative for abdominal pain, diarrhea, nausea and vomiting. Musculoskeletal: Negative for arthralgias and myalgias. Skin: Negative for rash. Neurological: Negative for dizziness and syncope.        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)     Obstructive sleep apnea     AHI:  29.0 per PSG, 2012       Current Outpatient Medications   Medication Sig Dispense Refill    levofloxacin (LEVAQUIN) 500 MG tablet Take 1 tablet by mouth daily for 10 days 10 tablet 0    ipratropium-albuterol (DUONEB) 0.5-2.5 (3) MG/3ML SOLN nebulizer solution Alternate with regular albuterol per nebulizer every 4 hours 360 mL 1    oseltamivir (TAMIFLU) 75 MG capsule Take 1 capsule by mouth 2 times daily for 5 days 10 capsule 0    hydrocodone-chlorpheniramine (TUSSIONEX PENNKINETIC ER) 10-8 MG/5ML SUER Take 5 mLs by mouth every 12 hours as needed (cough) for up to 10 days. 115 mL 0    albuterol (PROVENTIL) (2.5 MG/3ML) 0.083% nebulizer solution Take 3 mLs by nebulization every 4 hours as needed for Wheezing or Shortness of Breath 60 vial 1    benzonatate (TESSALON) 100 MG capsule Take 2 capsules by mouth 3 times daily as needed for Cough 60 capsule 1    Dextromethorphan-guaiFENesin (MUCINEX DM)  MG TB12 Take 1 tablet by mouth every 12 hours as needed (cough/congestion) 28 tablet 0    predniSONE (DELTASONE) 20 MG tablet Take 1 tablet by mouth 2 times daily for 5 days, THEN 0.5 tablets 2 times daily for 3 days. 20 tablet 0    amphetamine-dextroamphetamine (ADDERALL XR) 30 MG extended release capsule Take 1 capsule by mouth 2 times daily for 32 days. 60 capsule 0    fluticasone (FLONASE) 50 MCG/ACT nasal spray SPRAY 2 SPRAYS INTO EACH NOSTRIL EVERY DAY 3 Bottle 1    losartan (COZAAR) 50 MG tablet Take 1 tablet by mouth daily 90 tablet 3    albuterol sulfate  (90 Base) MCG/ACT inhaler Inhale 2 puffs into the lungs every 6 hours as needed for Wheezing 1 Inhaler 0     No current facility-administered medications for this visit. No Known Allergies    History reviewed. No pertinent surgical history.     Social History     Tobacco Use    Smoking status: Never Smoker    Smokeless tobacco: Never Used   Substance Use Topics    Alcohol use: Yes     Comment: twice a year social    Drug use: No       Family History   Problem Relation Age of Onset    Diabetes Mother     Hypertension Mother     Asthma Mother     Coronary Art Dis Father     Heart Attack Father     Hypertension Father     Cancer Maternal Grandmother         kidney    Stroke Paternal Grandfather     Cancer Paternal Grandfather         lung       /82   Pulse 120   Temp 97.2 °F (36.2 °C) (Temporal)   Resp 20   Ht 5' 9\" (1.753 m)   Wt (!) 359 lb (162.8 kg)   SpO2 97%   BMI 53.02 kg/m²     Physical Exam  Vitals signs reviewed. Constitutional:       General: He is not in acute distress. Appearance: Normal appearance. He is well-developed. HENT:      Head: Normocephalic. Right Ear: External ear normal.      Left Ear: External ear normal.      Ears:      Comments: TMs dull bilaterally, no erythema or bulging     Nose: Nose normal.      Mouth/Throat:      Mouth: Mucous membranes are moist.      Pharynx: Posterior oropharyngeal erythema present. No oropharyngeal exudate. Comments: Moderate postpharyngeal erythema, no exudate  Eyes:      Conjunctiva/sclera: Conjunctivae normal.      Pupils: Pupils are equal, round, and reactive to light. Neck:      Musculoskeletal: Normal range of motion and neck supple. Thyroid: No thyromegaly. Vascular: No JVD. Trachea: No tracheal deviation. Cardiovascular:      Rate and Rhythm: Normal rate and regular rhythm. Heart sounds: Normal heart sounds. No murmur. Pulmonary:      Effort: Pulmonary effort is normal. No respiratory distress. Breath sounds: Wheezing present. Comments: Diminished breath sounds bilaterally, faint wheeze on forced expiration, respirations even and unlabored  Musculoskeletal: Normal range of motion. Lymphadenopathy:      Cervical: No cervical adenopathy. Skin:     General: Skin is warm and dry. Findings: No rash. Neurological:      Mental Status: He is alert. ASSESSMENT/PLAN:  1.  Persistent cough for 3 weeks or longer  - Repeat chest x-ray due to persistent cough, not improving  -[facility] respiratory swab today  - Switch antibiotic to levofloxacin 500 mg daily x10 days.  - Dexamethasone 10 mg IM today  - Continue prednisone taper is taking.  - Continue Asmanex, continue albuterol per neb 3-4 times daily.  - POCT Influenza A/B  - XR CHEST STANDARD (2 VW); Future  - dexamethasone (DECADRON) injection 10 mg    2. Fever, unspecified fever cause  - POCT Influenza A/B  - XR CHEST STANDARD (2 VW); Future    3. Acute bronchitis with bronchospasm  --Plan as above    4. Essential (primary) hypertension  - Blood pressure stable, continue current medication    5. Influenza B  - Influenza was positive for type B, unclear when this started but with his acute worsening him going to treat him with Tamiflu 75 mg twice daily x5 days    I will see him back in 3 days, he was advised to go to ER with any acute worsening over the weekend. Greater than 50% of total appointment time 25 minutes spent counseling, addressing acute issues, coordination of care. No follow-ups on file. Argelia Snyder was seen today for cough and congestion. Diagnoses and all orders for this visit:    Persistent cough for 3 weeks or longer  -     POCT Influenza A/B  -     XR CHEST STANDARD (2 VW); Future  -     dexamethasone (DECADRON) injection 10 mg    Fever, unspecified fever cause  -     POCT Influenza A/B  -     XR CHEST STANDARD (2 VW); Future    Acute bronchitis with bronchospasm    Essential (primary) hypertension    Influenza B    Other orders  -     levofloxacin (LEVAQUIN) 500 MG tablet; Take 1 tablet by mouth daily for 10 days  -     ipratropium-albuterol (DUONEB) 0.5-2.5 (3) MG/3ML SOLN nebulizer solution; Alternate with regular albuterol per nebulizer every 4 hours  -     oseltamivir (TAMIFLU) 75 MG capsule;  Take 1 capsule by mouth 2 times daily for 5 days      Medications Discontinued During This Encounter   Medication Reason    albuterol sulfate  (90 Base) MCG/ACT inhaler LIST CLEANUP    amoxicillin-clavulanate (AUGMENTIN) 875-125 MG per tablet Alternate therapy     Patient Instructions   -Stop augmentin  -Start levofloxacin - new antibiotic - start today  -Alternate regular

## 2020-02-10 ENCOUNTER — OFFICE VISIT (OUTPATIENT)
Dept: FAMILY MEDICINE CLINIC | Age: 40
End: 2020-02-10
Payer: COMMERCIAL

## 2020-02-10 VITALS
SYSTOLIC BLOOD PRESSURE: 138 MMHG | RESPIRATION RATE: 20 BRPM | BODY MASS INDEX: 46.65 KG/M2 | OXYGEN SATURATION: 96 % | HEIGHT: 69 IN | DIASTOLIC BLOOD PRESSURE: 88 MMHG | HEART RATE: 126 BPM | TEMPERATURE: 98.3 F | WEIGHT: 315 LBS

## 2020-02-10 PROCEDURE — 96372 THER/PROPH/DIAG INJ SC/IM: CPT | Performed by: NURSE PRACTITIONER

## 2020-02-10 PROCEDURE — 1036F TOBACCO NON-USER: CPT | Performed by: NURSE PRACTITIONER

## 2020-02-10 PROCEDURE — G8427 DOCREV CUR MEDS BY ELIG CLIN: HCPCS | Performed by: NURSE PRACTITIONER

## 2020-02-10 PROCEDURE — G8417 CALC BMI ABV UP PARAM F/U: HCPCS | Performed by: NURSE PRACTITIONER

## 2020-02-10 PROCEDURE — 99214 OFFICE O/P EST MOD 30 MIN: CPT | Performed by: NURSE PRACTITIONER

## 2020-02-10 PROCEDURE — G8484 FLU IMMUNIZE NO ADMIN: HCPCS | Performed by: NURSE PRACTITIONER

## 2020-02-10 RX ORDER — FLUTICASONE PROPIONATE 50 MCG
2 SPRAY, SUSPENSION (ML) NASAL DAILY
Qty: 1 BOTTLE | Refills: 3 | Status: SHIPPED | OUTPATIENT
Start: 2020-02-10 | End: 2020-02-24

## 2020-02-10 RX ORDER — CEFTRIAXONE 1 G/1
1 INJECTION, POWDER, FOR SOLUTION INTRAMUSCULAR; INTRAVENOUS ONCE
Status: COMPLETED | OUTPATIENT
Start: 2020-02-10 | End: 2020-02-10

## 2020-02-10 RX ADMIN — CEFTRIAXONE 1 G: 1 INJECTION, POWDER, FOR SOLUTION INTRAMUSCULAR; INTRAVENOUS at 09:21

## 2020-02-10 ASSESSMENT — ENCOUNTER SYMPTOMS
COUGH: 1
DIARRHEA: 0
CHEST TIGHTNESS: 0
VOMITING: 0
SHORTNESS OF BREATH: 1
SORE THROAT: 0
NAUSEA: 0
WHEEZING: 1
ABDOMINAL PAIN: 0

## 2020-02-10 NOTE — PROGRESS NOTES
Colleen Reynaga is a 35 y.o. male who presents today for  Chief Complaint   Patient presents with    Follow-up    Cough    Influenza       HPI:  Here for follow-up on bronchitis, influenza. He was seen in the office 3 days ago, POCT influenza was positive for type B. Unclear when it started but he was treated empirically with Tamiflu. He has had persistent cough and congestion/bronchitis for the past few weeks, initially treated with cefdinir and completed with no improvement. Then treated with Augmentin but continued to have symptoms. I switched his Augmentin to Levaquin. Chest x-ray 3 days ago was negative for infiltrate. [facility] respiratory swab was positive for influenza A but actually negative for influenza B (in office 3 days ago type B was positive, type a negative on POCT testing). MRSA was positive, swab otherwise negative. Likely colonization in nasal passages. He has had a total of 3 steroid injections over the past couple of weeks, most recently 3 days ago. He had one Rocephin injection as well 1 week ago. He continues to feel poorly but minimally improved. He still has frequent cough with wheeze. He is having intermittent sweats but has not checked his temp. I added duo nebs 3 days ago, he is alternating with albuterol every 4 hours. He is getting minimal relief with this. Also taking Mucinex D, Tussionex at night, Tessalon during the day for cough. Continues to have body aches. He is drinking well, eating fairly. Urinating well. No nausea or vomiting, no diarrhea. Blood pressure has been stable on current medication. Review of Systems   Constitutional: Positive for fatigue and fever (subjective, intermittent sweats). Negative for chills. HENT: Positive for congestion and postnasal drip. Negative for ear pain and sore throat. Respiratory: Positive for cough, shortness of breath and wheezing. Negative for chest tightness.     Cardiovascular: Negative for chest pain.   Gastrointestinal: Negative for abdominal pain, diarrhea, nausea and vomiting. Musculoskeletal: Positive for myalgias. Negative for arthralgias. Skin: Negative for rash. 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)     Obstructive sleep apnea     AHI:  29.0 per PSG, 2012       Current Outpatient Medications   Medication Sig Dispense Refill    fluticasone (FLONASE) 50 MCG/ACT nasal spray 2 sprays by Each Nostril route daily 1 Bottle 3    mupirocin (BACTROBAN) 2 % ointment Apply topically to nostrils with qtip twice daily x 2 weeks 15 g 0    levofloxacin (LEVAQUIN) 500 MG tablet Take 1 tablet by mouth daily for 10 days 10 tablet 0    ipratropium-albuterol (DUONEB) 0.5-2.5 (3) MG/3ML SOLN nebulizer solution Alternate with regular albuterol per nebulizer every 4 hours 360 mL 1    oseltamivir (TAMIFLU) 75 MG capsule Take 1 capsule by mouth 2 times daily for 5 days 10 capsule 0    hydrocodone-chlorpheniramine (TUSSIONEX PENNKINETIC ER) 10-8 MG/5ML SUER Take 5 mLs by mouth every 12 hours as needed (cough) for up to 10 days. 115 mL 0    albuterol (PROVENTIL) (2.5 MG/3ML) 0.083% nebulizer solution Take 3 mLs by nebulization every 4 hours as needed for Wheezing or Shortness of Breath 60 vial 1    benzonatate (TESSALON) 100 MG capsule Take 2 capsules by mouth 3 times daily as needed for Cough 60 capsule 1    Dextromethorphan-guaiFENesin (MUCINEX DM)  MG TB12 Take 1 tablet by mouth every 12 hours as needed (cough/congestion) 28 tablet 0    predniSONE (DELTASONE) 20 MG tablet Take 1 tablet by mouth 2 times daily for 5 days, THEN 0.5 tablets 2 times daily for 3 days. 20 tablet 0    amphetamine-dextroamphetamine (ADDERALL XR) 30 MG extended release capsule Take 1 capsule by mouth 2 times daily for 32 days.  60 capsule 0    losartan (COZAAR) 50 MG tablet Take 1 tablet by mouth daily 90 tablet 3    albuterol sulfate  (90 Base) MCG/ACT inhaler Inhale 2 puffs into the lungs every 6 hours as needed for Wheezing 1 Inhaler 0     No current facility-administered medications for this visit. No Known Allergies    History reviewed. No pertinent surgical history. Social History     Tobacco Use    Smoking status: Never Smoker    Smokeless tobacco: Never Used   Substance Use Topics    Alcohol use: Yes     Comment: twice a year social    Drug use: No       Family History   Problem Relation Age of Onset    Diabetes Mother     Hypertension Mother     Asthma Mother     Coronary Art Dis Father     Heart Attack Father     Hypertension Father     Cancer Maternal Grandmother         kidney    Stroke Paternal Grandfather     Cancer Paternal Grandfather         lung       /88   Pulse 126   Temp 98.3 °F (36.8 °C) (Temporal)   Resp 20   Ht 5' 9\" (1.753 m)   Wt (!) 359 lb (162.8 kg)   SpO2 96%   BMI 53.02 kg/m²     Physical Exam  Vitals signs reviewed. Constitutional:       General: He is not in acute distress. Appearance: Normal appearance. He is well-developed. HENT:      Head: Normocephalic. Right Ear: External ear normal.      Left Ear: External ear normal.      Ears:      Comments: TMs are dull bilaterally, minimal erythema worse on right. No bulging. Landmarks are slightly visible. Nose: Congestion present. Mouth/Throat:      Mouth: Mucous membranes are moist.      Pharynx: Posterior oropharyngeal erythema present. No oropharyngeal exudate. Comments: Moderate postpharyngeal erythema, no exudate  Eyes:      Conjunctiva/sclera: Conjunctivae normal.      Pupils: Pupils are equal, round, and reactive to light. Neck:      Musculoskeletal: Normal range of motion and neck supple. Thyroid: No thyromegaly. Vascular: No carotid bruit or JVD. Trachea: No tracheal deviation. Cardiovascular:      Rate and Rhythm: Normal rate and regular rhythm. Heart sounds: Normal heart sounds. No murmur. Pulmonary:      Effort: Pulmonary effort is normal. No respiratory distress. Breath sounds: Wheezing present. Comments: Faint wheeze bilaterally on forced expiration, mild rhonchi. Respirations are even and unlabored. Musculoskeletal: Normal range of motion. Lymphadenopathy:      Cervical: No cervical adenopathy. Skin:     General: Skin is warm and dry. Findings: No rash. Neurological:      Mental Status: He is alert. ASSESSMENT/PLAN:  1. Acute bronchitis with bronchospasm  - Give second Rocephin injection 1 g IM. Had previous 1 week ago. - Continue levofloxacin  - Continue prednisone taper.  - Continue duo nebs alternating with albuterol every 4 hours. - Continue Mucinex, Tessalon during the day, Tussionex in the evening  - cefTRIAXone (ROCEPHIN) injection 1 g    2. Influenza A  - Continue Tamiflu, supportive care with Tylenol/ibuprofen, adequate hydration, rest    3. MRSA nasal colonization  - We will treat empirically with mupirocin to nasal passages twice daily x2 weeks. Likely colonization on recent respiratory swab. No findings on chest x-ray that were worrisome. Negative for infiltrate. 4. Essential (primary) hypertension  - Stable, continue current medication    5. Non-recurrent acute serous otitis media of both ears  - Add back Flonase for congestion, serous otitis media. Needs refill today. I will see him back in 2 days for close follow-up, sooner with any acute worsening. No follow-ups on file. Jarrett Lund was seen today for follow-up, cough and influenza.     Diagnoses and all orders for this visit:    Acute bronchitis with bronchospasm  -     cefTRIAXone (ROCEPHIN) injection 1 g    Influenza A    MRSA nasal colonization    Essential (primary) hypertension    Non-recurrent acute serous otitis media of both ears    Other orders  -

## 2020-02-12 ENCOUNTER — OFFICE VISIT (OUTPATIENT)
Dept: FAMILY MEDICINE CLINIC | Age: 40
End: 2020-02-12
Payer: COMMERCIAL

## 2020-02-12 VITALS
HEART RATE: 118 BPM | OXYGEN SATURATION: 98 % | HEIGHT: 69 IN | BODY MASS INDEX: 46.65 KG/M2 | DIASTOLIC BLOOD PRESSURE: 84 MMHG | RESPIRATION RATE: 18 BRPM | SYSTOLIC BLOOD PRESSURE: 128 MMHG | WEIGHT: 315 LBS

## 2020-02-12 PROCEDURE — G8417 CALC BMI ABV UP PARAM F/U: HCPCS | Performed by: NURSE PRACTITIONER

## 2020-02-12 PROCEDURE — 1036F TOBACCO NON-USER: CPT | Performed by: NURSE PRACTITIONER

## 2020-02-12 PROCEDURE — G8427 DOCREV CUR MEDS BY ELIG CLIN: HCPCS | Performed by: NURSE PRACTITIONER

## 2020-02-12 PROCEDURE — G8484 FLU IMMUNIZE NO ADMIN: HCPCS | Performed by: NURSE PRACTITIONER

## 2020-02-12 PROCEDURE — 99214 OFFICE O/P EST MOD 30 MIN: CPT | Performed by: NURSE PRACTITIONER

## 2020-02-12 RX ORDER — METHYLPREDNISOLONE 4 MG/1
TABLET ORAL
Qty: 1 KIT | Refills: 0 | Status: SHIPPED | OUTPATIENT
Start: 2020-02-12 | End: 2020-02-18

## 2020-02-12 ASSESSMENT — ENCOUNTER SYMPTOMS
ABDOMINAL PAIN: 0
WHEEZING: 1
SORE THROAT: 0
CHEST TIGHTNESS: 0
COUGH: 1
DIARRHEA: 0
SHORTNESS OF BREATH: 1
VOMITING: 0
NAUSEA: 0

## 2020-02-12 NOTE — PROGRESS NOTES
Wendy Portillo is a 35 y.o. male who presents today for  Chief Complaint   Patient presents with    Follow-up     Pt states he is here for f/u flu, still really weak feeling pt states       HPI:  Here for follow-up on bronchitis, influenza. He was seen in the office 2 days ago, has been seen frequently recently due to persistent bronchitis and recent positive influenza type B. Bronchitis initially treated with cefdinir with no improvement, then Augmentin, still no improvement. I switched him to Levaquin which he continues taking. He continues to cough but this is improving slightly. He has occasional shortness of breath and wheeze. Using nebulizer every 4 hours alternating albuterol and DuoNeb's. Also using Asmanex inhaler given per Dr. Gretel Astorga at recent appointment, sample. He remains very weak, still coughs fairly frequently. He has had 2 recent chest x-rays, both negative for pneumonia. No recent fever but has had some night sweats. He has been on frequent prednisone and has had steroid injections. Had Rocephin injection 2 days ago as well as steroid injection. Also currently on prednisone taper. He feels he does better on Medrol Dosepak. He also complains of some tongue soreness, no significant sore throat. Concerned about thrush. He is not rinsing his mouth after use of steroid inhaler. No GERD or indigestion. Not on PPI or H2 blocker. He would like testosterone checked for continued fatigue, also possible ED issues, no decreased libido but may be having some erectile dysfunction issues. Forms for FMLA reviewed with patient, completed in office today. He has been off work since 2/3. We will keep him off work this week. He will return to work on 2/17 for only half days for 4 days. Max 4 hours/day. He has an upcoming appointment next week with Dr. Gretel Astorga as well.     Review of Systems   Constitutional: Positive for diaphoresis (sweating at night likely due to steroids) and fatigue. Negative for chills and fever. HENT: Positive for postnasal drip. Negative for congestion, ear pain and sore throat. Tongue pain   Respiratory: Positive for cough, shortness of breath (improving gradually) and wheezing. Negative for chest tightness. Cardiovascular: Negative for chest pain and palpitations. Gastrointestinal: Negative for abdominal pain, diarrhea, nausea and vomiting. Musculoskeletal: Negative for arthralgias and myalgias. Skin: Negative for rash. Neurological: Positive for weakness. 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)     Obstructive sleep apnea     AHI:  29.0 per PSG, 2012       Current Outpatient Medications   Medication Sig Dispense Refill    methylPREDNISolone (MEDROL DOSEPACK) 4 MG tablet Take by mouth as directed 1 kit 0    nystatin (MYCOSTATIN) 777068 UNIT/ML suspension Take 5 mLs by mouth 4 times daily for 10 days 200 mL 0    fluticasone (FLONASE) 50 MCG/ACT nasal spray 2 sprays by Each Nostril route daily 1 Bottle 3    mupirocin (BACTROBAN) 2 % ointment Apply topically to nostrils with qtip twice daily x 2 weeks 15 g 0    levofloxacin (LEVAQUIN) 500 MG tablet Take 1 tablet by mouth daily for 10 days 10 tablet 0    ipratropium-albuterol (DUONEB) 0.5-2.5 (3) MG/3ML SOLN nebulizer solution Alternate with regular albuterol per nebulizer every 4 hours 360 mL 1    hydrocodone-chlorpheniramine (TUSSIONEX PENNKINETIC ER) 10-8 MG/5ML SUER Take 5 mLs by mouth every 12 hours as needed (cough) for up to 10 days.  115 mL 0    albuterol (PROVENTIL) (2.5 MG/3ML) 0.083% nebulizer solution Take 3 mLs by nebulization every 4 hours as needed for Wheezing or Shortness of Breath 60 vial 1    Dextromethorphan-guaiFENesin (MUCINEX DM)  MG TB12 Take 1 tablet by mouth every 12 hours as needed (cough/congestion) 28 tablet 0    amphetamine-dextroamphetamine (ADDERALL XR) 30 MG extended release capsule Take 1 capsule by mouth 2 times daily for 32 days. 60 capsule 0    losartan (COZAAR) 50 MG tablet Take 1 tablet by mouth daily 90 tablet 3    albuterol sulfate  (90 Base) MCG/ACT inhaler Inhale 2 puffs into the lungs every 6 hours as needed for Wheezing 1 Inhaler 0     No current facility-administered medications for this visit. No Known Allergies    History reviewed. No pertinent surgical history. Social History     Tobacco Use    Smoking status: Never Smoker    Smokeless tobacco: Never Used   Substance Use Topics    Alcohol use: Yes     Comment: twice a year social    Drug use: No       Family History   Problem Relation Age of Onset    Diabetes Mother     Hypertension Mother     Asthma Mother     Coronary Art Dis Father     Heart Attack Father     Hypertension Father     Cancer Maternal Grandmother         kidney    Stroke Paternal Grandfather     Cancer Paternal Grandfather         lung       /84   Pulse 118   Resp 18   Ht 5' 9\" (1.753 m)   Wt (!) 353 lb (160.1 kg)   SpO2 98%   BMI 52.13 kg/m²     Physical Exam  Vitals signs reviewed. Constitutional:       General: He is not in acute distress. Appearance: Normal appearance. He is well-developed. HENT:      Head: Normocephalic. Right Ear: External ear normal.      Left Ear: External ear normal.      Ears:      Comments: TMs slightly dull bilaterally, no erythema, visible landmarks     Nose: Nose normal.      Mouth/Throat:      Mouth: Mucous membranes are moist.      Pharynx: Posterior oropharyngeal erythema present. No oropharyngeal exudate. Comments: Moderate postpharyngeal erythema, no exudate  Eyes:      Conjunctiva/sclera: Conjunctivae normal.      Pupils: Pupils are equal, round, and reactive to light. Neck:      Musculoskeletal: Normal range of motion and neck supple. Thyroid: No thyromegaly. Vascular: No carotid bruit or JVD. Trachea: No tracheal deviation. Cardiovascular:      Rate and Rhythm: Normal rate and regular rhythm. Heart sounds: Normal heart sounds. No murmur. Pulmonary:      Effort: Pulmonary effort is normal. No respiratory distress. Comments: Diminished breath sounds bilaterally. Not able to take full deep breath but no acute distress. Respirations even and unlabored. Musculoskeletal: Normal range of motion. Lymphadenopathy:      Cervical: No cervical adenopathy. Skin:     General: Skin is warm and dry. Findings: No rash. Neurological:      Mental Status: He is alert. ASSESSMENT/PLAN:  1. Influenza B  - Tamiflu has been completed  - Continue symptomatic treatment. Remains weak and fatigued. Discussed that this may take a period of time before he starts feeling better. - FMLA papers completed, needs to remain off work due to continued fatigue from influenza as well as persistent bronchitis. 2. Essential (primary) hypertension  - Stable, continue current medication    3. Acute bronchitis with bronchospasm  - Complete Levaquin, no further antibiotics needed  - Medrol Dosepak prescribed, advised to stop prednisone taper  - Continue alternating DuoNeb's and albuterol every 4 hours  - Sample of Asmanex given to patient again, he is almost out. 200 mcg 2 puffs bid. Advised to rinse mouth after each use to avoid thrush. 4. Fatigue, unspecified type  - Secondary to persistent bronchitis, flu. Paperwork completed to remain off work as noted above  - He has upcoming lab per Dr. Quique Parra. Will add magnesium and testosterone due to fatigue, patient inquires about testosterone level. Possible erectile dysfunction as well. - Testosterone; Future  - Magnesium; Future    5. Mouth pain  - Treat empirically with nystatin swish and swallow for possible thrush. Follow-up as scheduled, sooner with problems.     Greater than 50% of total appt time of 25 minutes spent counseling, addressing acute issues, completing paperwork, coordination of care. No follow-ups on file. Tracie Brunner was seen today for follow-up. Diagnoses and all orders for this visit:    Influenza B    Essential (primary) hypertension    Acute bronchitis with bronchospasm    Fatigue, unspecified type  -     Testosterone; Future  -     Magnesium; Future    Mouth pain    Other orders  -     methylPREDNISolone (MEDROL DOSEPACK) 4 MG tablet; Take by mouth as directed  -     nystatin (MYCOSTATIN) 092568 UNIT/ML suspension; Take 5 mLs by mouth 4 times daily for 10 days      Medications Discontinued During This Encounter   Medication Reason    oseltamivir (TAMIFLU) 75 MG capsule Therapy completed     There are no Patient Instructions on file for this visit. Patient voicesunderstanding and agrees to plans along with risks and benefits of plan. Counseling:  Kayla Bellamy case, medications and options were discussed in detail. Patient was instructed to call the office if he questionsregarding him treatment. Should him conditions worsen, he should return to office to be reassessed by DONALD Chiu. he Should to go the closest Emergency Department for any emergency. They verbalizedunderstanding the above instructions. No follow-ups on file.

## 2020-02-13 DIAGNOSIS — Z13.220 LIPID SCREENING: ICD-10-CM

## 2020-02-13 DIAGNOSIS — R53.83 FATIGUE, UNSPECIFIED TYPE: ICD-10-CM

## 2020-02-13 DIAGNOSIS — R73.9 HYPERGLYCEMIA: ICD-10-CM

## 2020-02-13 DIAGNOSIS — I10 ESSENTIAL (PRIMARY) HYPERTENSION: ICD-10-CM

## 2020-02-13 LAB
ALBUMIN SERPL-MCNC: 4 G/DL (ref 3.5–5.2)
ALP BLD-CCNC: 74 U/L (ref 40–130)
ALT SERPL-CCNC: 79 U/L (ref 5–41)
ANION GAP SERPL CALCULATED.3IONS-SCNC: 16 MMOL/L (ref 7–19)
AST SERPL-CCNC: 31 U/L (ref 5–40)
BANDED NEUTROPHILS RELATIVE PERCENT: 4 % (ref 0–5)
BASOPHILS ABSOLUTE: 0.3 K/UL (ref 0–0.2)
BASOPHILS RELATIVE PERCENT: 2 % (ref 0–1)
BILIRUB SERPL-MCNC: 0.4 MG/DL (ref 0.2–1.2)
BUN BLDV-MCNC: 22 MG/DL (ref 6–20)
CALCIUM SERPL-MCNC: 9 MG/DL (ref 8.6–10)
CHLORIDE BLD-SCNC: 99 MMOL/L (ref 98–111)
CHOLESTEROL, TOTAL: 213 MG/DL (ref 160–199)
CO2: 26 MMOL/L (ref 22–29)
CREAT SERPL-MCNC: 0.9 MG/DL (ref 0.5–1.2)
EOSINOPHILS ABSOLUTE: 0 K/UL (ref 0–0.6)
EOSINOPHILS RELATIVE PERCENT: 0 % (ref 0–5)
GFR NON-AFRICAN AMERICAN: >60
GLUCOSE BLD-MCNC: 155 MG/DL (ref 74–109)
HBA1C MFR BLD: 8.4 % (ref 4–6)
HCT VFR BLD CALC: 53.8 % (ref 42–52)
HDLC SERPL-MCNC: 43 MG/DL (ref 55–121)
HEMOGLOBIN: 17.1 G/DL (ref 14–18)
IMMATURE GRANULOCYTES #: 0.8 K/UL
LDL CHOLESTEROL CALCULATED: ABNORMAL MG/DL
LDL CHOLESTEROL DIRECT: 146 MG/DL
LYMPHOCYTES ABSOLUTE: 7.1 K/UL (ref 1.1–4.5)
LYMPHOCYTES RELATIVE PERCENT: 36 % (ref 20–40)
MAGNESIUM: 2.3 MG/DL (ref 1.6–2.6)
MCH RBC QN AUTO: 29.8 PG (ref 27–31)
MCHC RBC AUTO-ENTMCNC: 31.8 G/DL (ref 33–37)
MCV RBC AUTO: 93.9 FL (ref 80–94)
METAMYELOCYTES RELATIVE PERCENT: 1 %
MONOCYTES ABSOLUTE: 0.3 K/UL (ref 0–0.9)
MONOCYTES RELATIVE PERCENT: 2 % (ref 0–10)
NEUTROPHILS ABSOLUTE: 8.7 K/UL (ref 1.5–7.5)
NEUTROPHILS RELATIVE PERCENT: 48 % (ref 50–65)
PDW BLD-RTO: 14.2 % (ref 11.5–14.5)
PLATELET # BLD: 366 K/UL (ref 130–400)
PLATELET SLIDE REVIEW: ADEQUATE
PMV BLD AUTO: 10 FL (ref 9.4–12.4)
POTASSIUM SERPL-SCNC: 4.5 MMOL/L (ref 3.5–5)
RBC # BLD: 5.73 M/UL (ref 4.7–6.1)
RBC # BLD: NORMAL 10*6/UL
SODIUM BLD-SCNC: 141 MMOL/L (ref 136–145)
TESTOSTERONE TOTAL: 327.8 NG/DL (ref 249–836)
TOTAL PROTEIN: 6.8 G/DL (ref 6.6–8.7)
TRIGL SERPL-MCNC: 409 MG/DL (ref 0–149)
WBC # BLD: 16.5 K/UL (ref 4.8–10.8)

## 2020-02-17 RX ORDER — ALBUTEROL SULFATE 90 UG/1
AEROSOL, METERED RESPIRATORY (INHALATION)
Qty: 6.7 INHALER | Refills: 0 | Status: SHIPPED | OUTPATIENT
Start: 2020-02-17 | End: 2022-06-28 | Stop reason: ALTCHOICE

## 2020-02-18 ENCOUNTER — OFFICE VISIT (OUTPATIENT)
Dept: FAMILY MEDICINE CLINIC | Age: 40
End: 2020-02-18
Payer: COMMERCIAL

## 2020-02-18 VITALS
TEMPERATURE: 97.4 F | WEIGHT: 315 LBS | SYSTOLIC BLOOD PRESSURE: 134 MMHG | DIASTOLIC BLOOD PRESSURE: 92 MMHG | HEART RATE: 121 BPM | OXYGEN SATURATION: 98 % | BODY MASS INDEX: 51.69 KG/M2

## 2020-02-18 PROCEDURE — G8484 FLU IMMUNIZE NO ADMIN: HCPCS | Performed by: FAMILY MEDICINE

## 2020-02-18 PROCEDURE — G8427 DOCREV CUR MEDS BY ELIG CLIN: HCPCS | Performed by: FAMILY MEDICINE

## 2020-02-18 PROCEDURE — G8417 CALC BMI ABV UP PARAM F/U: HCPCS | Performed by: FAMILY MEDICINE

## 2020-02-18 PROCEDURE — 99214 OFFICE O/P EST MOD 30 MIN: CPT | Performed by: FAMILY MEDICINE

## 2020-02-18 PROCEDURE — 1036F TOBACCO NON-USER: CPT | Performed by: FAMILY MEDICINE

## 2020-02-18 RX ORDER — MONTELUKAST SODIUM 10 MG/1
10 TABLET ORAL DAILY
Qty: 30 TABLET | Refills: 3 | Status: SHIPPED | OUTPATIENT
Start: 2020-02-18 | End: 2020-05-19 | Stop reason: ALTCHOICE

## 2020-02-18 RX ORDER — GLUCOSAMINE HCL/CHONDROITIN SU 500-400 MG
1 CAPSULE ORAL DAILY
Qty: 30 STRIP | Refills: 11 | Status: SHIPPED | OUTPATIENT
Start: 2020-02-18 | End: 2020-12-15

## 2020-02-18 RX ORDER — SYRING-NEEDL,DISP,INSUL,0.3 ML 30 GX5/16"
SYRINGE, EMPTY DISPOSABLE MISCELLANEOUS
Qty: 30 DEVICE | Refills: 11 | Status: SHIPPED | OUTPATIENT
Start: 2020-02-18 | End: 2020-12-15

## 2020-02-18 NOTE — PATIENT INSTRUCTIONS
We are committed to providing you with the best care possible. In order to help us achieve these goals please remember to bring all medications, herbal products, and over the counter supplements with you to each visit. If your provider has ordered testing for you, please be sure to follow up with our office if you have not received results within 7 days after the testing took place. *If you receive a survey after visiting one of our offices, please take time to share your experience concerning your physician office visit. These surveys are confidential and no health information about you is shared. We are eager to improve for you and we are counting on your feedback to help make that happen.         Goal:  Fasting glucose: 70 to 120      Dulera: 1 puff twice a day  Albuterol: 2 puffs every 6 hours as needed  singulair 10 mg once a day  Flonase 1 spray each nostril twice a day for 1 week then once a day

## 2020-02-18 NOTE — PROGRESS NOTES
[facility] [address] [place] [address]  Dept: [phone]  Dept Fax: [phone]  Loc: [phone]    Valery Forde is a 35 y.o. male who presents today for his medical conditions/complaints as noted below. Valery Forde is here for Follow-up        HPI:   CC: Here today to discuss the following:    Has been seen multiple times for recurrent bronchitis. Today, is feeling somewhat better. He has been using albuterol as needed. Been taking over-the-counter decongestants, antihistamines and Flonase. Hypertension  Compliant with medications. No adverse effects from medication. No lightheadedness, palpitations, or chest pain. 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 Family History   Problem Relation Age of Onset    Diabetes Mother     Hypertension Mother     Asthma Mother     Coronary Art Dis Father     Heart Attack Father     Hypertension Father     Cancer Maternal Grandmother         kidney    Stroke Paternal Grandfather     Cancer Paternal Grandfather         lung       Social History     Tobacco Use    Smoking status: Never Smoker    Smokeless tobacco: Never Used   Substance Use Topics    Alcohol use: Yes     Comment: twice a year social     Current Outpatient Medications   Medication Sig Dispense Refill    montelukast (SINGULAIR) 10 MG tablet Take 1 tablet by mouth daily 30 tablet 3    Misc. Devices MISC Glucometer  ICD 10: E11.9 1 Device 0    Lancet Device MISC 1 lancet per strip.  30 Device 11    blood glucose monitor strips 1 strip by Other route daily 30 strip 11    albuterol sulfate  (90 Base) MCG/ACT inhaler TAKE 2 0.00 - 0.20 K/uL    Bands Relative 4 0 - 5 %    Metamyelocytes Relative 1 (A) %    RBC Morphology Normal    Lipid Panel    Collection Time: 20 11:02 AM   Result Value Ref Range    Cholesterol, Total 213 (H) 160 - 199 mg/dL    Triglycerides 409 (H) 0 - 149 mg/dL    HDL 43 (L) 55 - 121 mg/dL    LDL Calculated see below <100 mg/dL   Comprehensive Metabolic Panel    Collection Time: 20 11:02 AM   Result Value Ref Range    Sodium 141 136 - 145 mmol/L    Potassium 4.5 3.5 - 5.0 mmol/L    Chloride 99 98 - 111 mmol/L    CO2 26 22 - 29 mmol/L    Anion Gap 16 7 - 19 mmol/L    Glucose 155 (H) 74 - 109 mg/dL    BUN 22 (H) 6 - 20 mg/dL    CREATININE 0.9 0.5 - 1.2 mg/dL    GFR Non-African American >60 >60    Calcium 9.0 8.6 - 10.0 mg/dL    Total Protein 6.8 6.6 - 8.7 g/dL    Alb 4.0 3.5 - 5.2 g/dL    Total Bilirubin 0.4 0.2 - 1.2 mg/dL    Alkaline Phosphatase 74 40 - 130 U/L    ALT 79 (H) 5 - 41 U/L    AST 31 5 - 40 U/L   Hemoglobin A1C    Collection Time: 20 11:02 AM   Result Value Ref Range    Hemoglobin A1C 8.4 (H) 4.0 - 6.0 %   LDL Cholesterol, Direct    Collection Time: 20 11:02 AM   Result Value Ref Range    LDL Direct 146 (H) <100 mg/dL               Assessment & Plan: The following diagnoses and conditions are stable with no further orders unless indicated:  1. Acute bronchitis, unspecified organism  Recommend Singulair 10 mg daily  Albuterol as needed  Given a sample of Dulera 100 mc puff twice daily for 60 days  Continue with Flonase. 2. Hyperglycemia  Lab Results   Component Value Date    LABA1C 8.4 (H) 2020    LABA1C 6.7 2019    LABA1C 6.9 (H) 2019     Lab Results   Component Value Date    LDLCALC see below 2020    CREATININE 0.9 2020     Considerable increase in his A1c. He attributes it to the recurrent use of steroids for his acute bronchitis. Given a glucometer and instructions on how to test glucose at home.   Recommend he check his fasting glucose for the next 2 weeks and to send his readings. Discussed lifestyle changes such as diet and exercise. Recommended eliminate processed food from diet such as sugar and fried foods. Recommended exercising at least 150 minutes/week. Try to do full body resistance training twice a week as well. Offered suggestions for calorie counting such as phone apps and online resources such as My fitness pal and Lose it. Discussed healthy weight. 3. Essential (primary) hypertension  BP Readings from Last 3 Encounters:   02/18/20 (!) 134/92   02/12/20 128/84   02/10/20 138/88     Blood pressure was slightly elevated today. We will continue to monitor. Return in about 1 month (around 3/18/2020) for Routine follow up - 15 minute visit. Discussed use, benefit, and side effects of prescribed medications. All patient questions answered. Pt voiced understanding. Reviewed health maintenance. Instructedto continue current medications, diet and exercise. Patient agreed with treatmentplan. Follow up as directed.        Note dictated using [id number] [employer]

## 2020-02-24 ASSESSMENT — ENCOUNTER SYMPTOMS
CONSTIPATION: 0
ABDOMINAL PAIN: 0
SHORTNESS OF BREATH: 1
COUGH: 1
DIARRHEA: 0
NAUSEA: 0
ANAL BLEEDING: 0
CHEST TIGHTNESS: 0

## 2020-03-03 NOTE — TELEPHONE ENCOUNTER
Requested Prescriptions     Pending Prescriptions Disp Refills    amphetamine-dextroamphetamine (ADDERALL XR) 30 MG extended release capsule 60 capsule 0     Sig: Take 1 capsule by mouth 2 times daily for 32 days.        Last Office Visit:  12/10/2019  Next Office Visit:  6/16/2020  Last Medication Refill:  1/31/2020  Brittany Mcgarry up to date:   3/2020    *RX updated to reflect   3/3/2020  fill date*

## 2020-03-04 RX ORDER — DEXTROAMPHETAMINE SACCHARATE, AMPHETAMINE ASPARTATE MONOHYDRATE, DEXTROAMPHETAMINE SULFATE AND AMPHETAMINE SULFATE 7.5; 7.5; 7.5; 7.5 MG/1; MG/1; MG/1; MG/1
30 CAPSULE, EXTENDED RELEASE ORAL 2 TIMES DAILY
Qty: 60 CAPSULE | Refills: 0 | Status: SHIPPED | OUTPATIENT
Start: 2020-03-04 | End: 2020-04-08 | Stop reason: SDUPTHER

## 2020-04-08 RX ORDER — DEXTROAMPHETAMINE SACCHARATE, AMPHETAMINE ASPARTATE MONOHYDRATE, DEXTROAMPHETAMINE SULFATE AND AMPHETAMINE SULFATE 7.5; 7.5; 7.5; 7.5 MG/1; MG/1; MG/1; MG/1
30 CAPSULE, EXTENDED RELEASE ORAL 2 TIMES DAILY
Qty: 60 CAPSULE | Refills: 0 | Status: SHIPPED | OUTPATIENT
Start: 2020-04-08 | End: 2020-05-05

## 2020-05-05 RX ORDER — DEXTROAMPHETAMINE SACCHARATE, AMPHETAMINE ASPARTATE MONOHYDRATE, DEXTROAMPHETAMINE SULFATE AND AMPHETAMINE SULFATE 7.5; 7.5; 7.5; 7.5 MG/1; MG/1; MG/1; MG/1
30 CAPSULE, EXTENDED RELEASE ORAL 2 TIMES DAILY
Qty: 60 CAPSULE | Refills: 0 | Status: SHIPPED | OUTPATIENT
Start: 2020-05-08 | End: 2020-06-09 | Stop reason: SDUPTHER

## 2020-05-15 RX ORDER — IRBESARTAN 150 MG/1
150 TABLET ORAL DAILY
Qty: 30 TABLET | Refills: 5 | Status: SHIPPED | OUTPATIENT
Start: 2020-05-15 | End: 2020-09-08

## 2020-05-19 ENCOUNTER — VIRTUAL VISIT (OUTPATIENT)
Dept: FAMILY MEDICINE CLINIC | Age: 40
End: 2020-05-19
Payer: COMMERCIAL

## 2020-05-19 PROCEDURE — G8428 CUR MEDS NOT DOCUMENT: HCPCS | Performed by: FAMILY MEDICINE

## 2020-05-19 PROCEDURE — 99214 OFFICE O/P EST MOD 30 MIN: CPT | Performed by: FAMILY MEDICINE

## 2020-05-19 NOTE — PROGRESS NOTES
2020    TELEHEALTH EVALUATION -- Audio/Visual (During [facility]BHB-57 public health emergency)    HPI:    Livia Kohler (:  1980) has requested an audio/video evaluation for the following concern(s):    Hypertension  Compliant with medications. No adverse effects from medication. No lightheadedness, palpitations, or chest pain. Backordered losartan switched to avapro. SARY on CPAP  Compliant with CPAP. No daytime somnolence. Feels rested for the most part when wearing CPAP. He developed influenza in February and had a prolonged course of bronchitis throughout the remainder of the winter and early spring. Today, he is feeling much better. Does have some allergy issues but is off his inhalers and allergy medication. Overall he feels he is back to his normal health. Review of Systems  Review of Systems   Constitutional: Negative for chills and fever. HENT: Negative for congestion. Respiratory: Negative for cough, chest tightness and shortness of breath. Cardiovascular: Negative for chest pain, palpitations and leg swelling. Gastrointestinal: Negative for abdominal pain, anal bleeding, constipation, diarrhea and nausea. Genitourinary: Negative for difficulty urinating. Psychiatric/Behavioral: Negative. Prior to Visit Medications    Medication Sig Taking? Authorizing Provider   irbesartan (AVAPRO) 150 MG tablet Take 1 tablet by mouth daily  Melissa Pacheco MD   amphetamine-dextroamphetamine (ADDERALL XR) 30 MG extended release capsule Take 1 capsule by mouth 2 times daily for 30 days. Valencia Ryder MD   Misc. Devices MISC Glucometer  ICD 10: E11.9  Melissa Pacheco MD   Lancet Device MISC 1 lancet per strip.   Melissa Pacheco MD   blood glucose monitor strips 1 strip by Other route daily  Melissa Pacheco MD   albuterol sulfate  (90 Base) MCG/ACT inhaler TAKE 2 PUFFS BY MOUTH EVERY 6 HOURS AS NEEDED FOR WHEEZE  Melissa Pacheco MD       Social History lifestyle changes such as diet and exercise. Recommended eliminate processed food from diet such as sugar and fried foods. Recommended exercising at least 150 minutes/week. Try to do full body resistance training twice a week as well. Offered suggestions for calorie counting such as phone apps and online resources such as My fitness pal and Lose it. Discussed healthy weight. Discussed goal blood pressure 120/70 continue with current Avapro 150 mg daily  - CBC Auto Differential; Future    2. Chronic nonseasonal allergic rhinitis due to pollen  Suggested if his symptoms come back he should start Flonase and antihistamine. He was on Singulair during the winter as well. 3. Hyperglycemia  Lab Results   Component Value Date    LABA1C 8.4 (H) 02/13/2020    LABA1C 6.7 09/16/2019    LABA1C 6.9 (H) 05/21/2019     Lab Results   Component Value Date    LDLCALC see below 02/13/2020    CREATININE 0.9 02/13/2020     Considerable increase in his A1c in February. He attributes it to his illness and being placed on steroids. He is concerned about coming out to the lab to the current coronavirus pandemic. Discussed our current procedure and obtaining labs with minimal exposure. Discussed with him temperatures and symptoms are screened at the front door. Masks are provided for those who do not have them. We try to limit traffic in and out of our lab as well. He states he may wait till June before getting laboratory work. Discussed my concerns with his blood sugar        - Comprehensive Metabolic Panel; Future  - Hemoglobin A1C; Future  - Microalbumin / Creatinine Urine Ratio; Future    4.  Familial hypercholesterolemia  Lab Results   Component Value Date    CHOL 213 (H) 02/13/2020    CHOL 207 (H) 05/21/2019    CHOL 184 05/14/2018     Lab Results   Component Value Date    TRIG 409 (H) 02/13/2020    TRIG 286 (H) 05/21/2019    TRIG 162 (H) 05/14/2018     Lab Results   Component Value Date    HDL 43 (L) 02/13/2020    HDL

## 2020-06-05 ENCOUNTER — TELEPHONE (OUTPATIENT)
Dept: NEUROLOGY | Age: 40
End: 2020-06-05

## 2020-06-09 ENCOUNTER — TELEMEDICINE (OUTPATIENT)
Dept: NEUROLOGY | Age: 40
End: 2020-06-09
Payer: COMMERCIAL

## 2020-06-09 PROCEDURE — 99213 OFFICE O/P EST LOW 20 MIN: CPT | Performed by: PSYCHIATRY & NEUROLOGY

## 2020-06-09 PROCEDURE — G8427 DOCREV CUR MEDS BY ELIG CLIN: HCPCS | Performed by: PSYCHIATRY & NEUROLOGY

## 2020-06-09 RX ORDER — DEXTROAMPHETAMINE SACCHARATE, AMPHETAMINE ASPARTATE MONOHYDRATE, DEXTROAMPHETAMINE SULFATE AND AMPHETAMINE SULFATE 7.5; 7.5; 7.5; 7.5 MG/1; MG/1; MG/1; MG/1
30 CAPSULE, EXTENDED RELEASE ORAL 2 TIMES DAILY
Qty: 60 CAPSULE | Refills: 0 | Status: SHIPPED | OUTPATIENT
Start: 2020-06-09 | End: 2020-07-20 | Stop reason: SDUPTHER

## 2020-06-09 NOTE — PROGRESS NOTES
[facility]  [address] [place] [address] Drive, [address] [facility] [address]  [facility]  Phone [phone]  Fax [phone]     [facility] Sleep Virtual Video Follow Up Encounter  20 4:21 PM CDT  The Following was conducted as a Telehealth Evaluation - Audio/Visual (during the [place] [id number] public health emergency)    Information:   Patient Name: Cooper Andrade  :   1980  Age:   35 y.o. MRN:   [id number]  Account #:  [de-identified]  Today:  20    Provider: Charlie Cortez M.D. Patient Location: Home  Provider Location: The provider is located at [facility] in [place], [place]  Also Present:  No one    Chief Complaint:   Chief Complaint   Patient presents with    6 Month Follow-Up     Narcolepsy       Subjective:   Cooper Andrade is a 35 y.o. man with a history of narcolepsy and obstructive sleep apnea who is following up. He is doing well. He is working from home. He has tried Ritalin, Dexadrine, Xyrem, and Provigil in the past. Brittany Parmar is presently on Adderal 60 mg a day. Brittany Parmar is prescribed CPAP at 14cm for his SARY.  He uses it every night. Brittany Parmar has no insomnia. Brittany Parmar is getting more sleep at night lately and that has helped his daytime drowsiness.        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 No pertinent surgical history. Recent Hospitalizations  · None    Significant Injuries  · None    Habits  Cooper Andrade reports that he has never smoked. He has never used smokeless tobacco. He reports current alcohol use. He reports that he does not use drugs.     Family History   Problem Relation Age of Onset    Diabetes Mother     Hypertension Mother     Asthma Mother     Coronary Art Dis Father     Heart Attack lesions  Psychological: negative for - anxiety and depression  Neurological: negative for - memory loss, numbness/tingling, and weakness     Physical Examination:  Vitals:  (As obtained by patient/caregiver or practitioner observation). Not taken/available if data not entered. BP -  P -  R -  T -  PO -    General appearance:  Alert, well developed, well nourished, in no distress  HEENT:  normocephalic, atraumatic, sclera appear normal, no observable or audible rhinorrhea, Ears appear normal, oral mucous membranes are moist without erythema, trachea appears midline, no observable anterior neck masses  Cardiovascular:  No observable peripheral edema, No observable cyanosis or clubbing. Pulmonary:  Breathing appears normal, good expansion, normal effort without use of accessory muscles  Musculoskeletal:  Joints - appear normal.  No splints, slings, or casts. Spine appears normal with normal posture and range of motion. Integument:  No rash, erythema, or pallor on visible skin  Psychiatric:  Mood, affect, and behavior appear normal    Neurologic:  Mental Status:  alert, oriented to person, place, and time. Speech:  Clear without dysarthria or dysphonia  Language:  Fluent without aphasia  Cranial Nerves:   III,IV, VI Extraocular movements are full   VII Facial movements are symmetrical without weakness   VIII Hearing is intact   XII No tongue atrophy or fasciculations. Normal tongue protrusion. No tongue weakness  Motor:  No evident muscle atrophy. No gross muscle weakness noted. No tremor noted. Coordination:  Rapid alternating movements are normal in both upper and lower extremities. Extension nose testing is unimpaired bilaterally. Gait:  Normal station and gait. Pertinent Diagnostic Studies:  DAYNA is appropriate    Assessment:       ICD-10-CM    1. Narcolepsy without cataplexy G47.419 amphetamine-dextroamphetamine (ADDERALL XR) 30 MG extended release capsule   2.  Obstructive sleep apnea G47.33

## 2020-07-21 RX ORDER — DEXTROAMPHETAMINE SACCHARATE, AMPHETAMINE ASPARTATE MONOHYDRATE, DEXTROAMPHETAMINE SULFATE AND AMPHETAMINE SULFATE 7.5; 7.5; 7.5; 7.5 MG/1; MG/1; MG/1; MG/1
30 CAPSULE, EXTENDED RELEASE ORAL 2 TIMES DAILY
Qty: 60 CAPSULE | Refills: 0 | Status: SHIPPED | OUTPATIENT
Start: 2020-07-21 | End: 2020-08-21 | Stop reason: SDUPTHER

## 2020-08-24 RX ORDER — DEXTROAMPHETAMINE SACCHARATE, AMPHETAMINE ASPARTATE MONOHYDRATE, DEXTROAMPHETAMINE SULFATE AND AMPHETAMINE SULFATE 7.5; 7.5; 7.5; 7.5 MG/1; MG/1; MG/1; MG/1
30 CAPSULE, EXTENDED RELEASE ORAL 2 TIMES DAILY
Qty: 60 CAPSULE | Refills: 0 | Status: SHIPPED | OUTPATIENT
Start: 2020-08-24 | End: 2020-09-18 | Stop reason: SDUPTHER

## 2020-09-08 RX ORDER — IRBESARTAN 150 MG/1
TABLET ORAL
Qty: 90 TABLET | Refills: 1 | Status: SHIPPED | OUTPATIENT
Start: 2020-09-08 | End: 2021-02-28

## 2020-09-18 RX ORDER — DEXTROAMPHETAMINE SACCHARATE, AMPHETAMINE ASPARTATE MONOHYDRATE, DEXTROAMPHETAMINE SULFATE AND AMPHETAMINE SULFATE 7.5; 7.5; 7.5; 7.5 MG/1; MG/1; MG/1; MG/1
30 CAPSULE, EXTENDED RELEASE ORAL 2 TIMES DAILY
Qty: 60 CAPSULE | Refills: 0 | Status: SHIPPED | OUTPATIENT
Start: 2020-09-23 | End: 2020-10-22 | Stop reason: SDUPTHER

## 2020-09-22 ENCOUNTER — VIRTUAL VISIT (OUTPATIENT)
Dept: FAMILY MEDICINE CLINIC | Age: 40
End: 2020-09-22
Payer: COMMERCIAL

## 2020-09-22 PROCEDURE — 99214 OFFICE O/P EST MOD 30 MIN: CPT | Performed by: FAMILY MEDICINE

## 2020-09-22 PROCEDURE — G8428 CUR MEDS NOT DOCUMENT: HCPCS | Performed by: FAMILY MEDICINE

## 2020-09-22 NOTE — PROGRESS NOTES
EXAMINED]  Vital Signs: (As obtained by patient/caregiver or practitioner observation)    Blood pressure-  Heart rate-    Respiratory rate-    Temperature-  Pulse oximetry-     Constitutional: [x] Appears well-developed and well-nourished [x] No apparent distress      [] Abnormal-   Mental status  [x] Alert and awake  [] Oriented to person/place/time [x]Able to follow commands      Eyes:  EOM    [x]  Normal  [] Abnormal-  Sclera  [x]  Normal  [] Abnormal -         Discharge []  None visible  [] Abnormal -    HENT:   [x] Normocephalic, atraumatic. [] Abnormal   [] Mouth/Throat: Mucous membranes are moist.     External Ears [x] Normal  [] Abnormal-     Neck: [x] No visualized mass     Pulmonary/Chest: [x] Respiratory effort normal.  [x] No visualized signs of difficulty breathing or respiratory distress        [] Abnormal-      Musculoskeletal:   [] Normal gait with no signs of ataxia         [] Normal range of motion of neck        [] Abnormal-       Neurological:        [] No Facial Asymmetry (Cranial nerve 7 motor function) (limited exam to video visit)          [] No gaze palsy        [] Abnormal-         Skin:        [x] No significant exanthematous lesions or discoloration noted on facial skin         [] Abnormal-            Psychiatric:       [x] Normal Affect [x] No Hallucinations        [] Abnormal-     Other pertinent observable physical exam findings-     No results found for this or any previous visit (from the past 672 hour(s)). He missed his laboratory appointment  ASSESSMENT/PLAN:  1. Essential (primary) hypertension  Blood pressures have been stable    2. Hyperglycemia  Advised to go the lab to get his A1c  Discussed lifestyle changes such as diet and exercise. Recommended eliminate processed food from diet such as sugar and fried foods. Recommended exercising at least 150 minutes/week. Try to do full body resistance training twice a week as well.   Offered suggestions for calorie counting such as

## 2020-10-22 RX ORDER — DEXTROAMPHETAMINE SACCHARATE, AMPHETAMINE ASPARTATE MONOHYDRATE, DEXTROAMPHETAMINE SULFATE AND AMPHETAMINE SULFATE 7.5; 7.5; 7.5; 7.5 MG/1; MG/1; MG/1; MG/1
30 CAPSULE, EXTENDED RELEASE ORAL 2 TIMES DAILY
Qty: 60 CAPSULE | Refills: 0 | Status: SHIPPED | OUTPATIENT
Start: 2020-10-23 | End: 2020-11-23 | Stop reason: SDUPTHER

## 2020-10-22 NOTE — TELEPHONE ENCOUNTER
Requested Prescriptions     Pending Prescriptions Disp Refills    amphetamine-dextroamphetamine (ADDERALL XR) 30 MG extended release capsule 60 capsule 0     Sig: Take 1 capsule by mouth 2 times daily for 30 days.        Last Office Visit:  6/9/2020  Next Office Visit:  12/15/2020  Last Medication Refill:  9/23/20  Jinx File up to date: 8/25/20     *RX updated to reflect   10/23/20  fill date*

## 2020-11-24 RX ORDER — DEXTROAMPHETAMINE SACCHARATE, AMPHETAMINE ASPARTATE MONOHYDRATE, DEXTROAMPHETAMINE SULFATE AND AMPHETAMINE SULFATE 7.5; 7.5; 7.5; 7.5 MG/1; MG/1; MG/1; MG/1
30 CAPSULE, EXTENDED RELEASE ORAL 2 TIMES DAILY
Qty: 60 CAPSULE | Refills: 0 | Status: SHIPPED | OUTPATIENT
Start: 2020-11-24 | End: 2021-01-03 | Stop reason: SDUPTHER

## 2020-11-24 NOTE — TELEPHONE ENCOUNTER
Requested Prescriptions     Pending Prescriptions Disp Refills    amphetamine-dextroamphetamine (ADDERALL XR) 30 MG extended release capsule 60 capsule 0     Sig: Take 1 capsule by mouth 2 times daily for 30 days.        Last Office Visit:  6/9/2020  Next Office Visit:  12/15/2020  Last Medication Refill: 10/23/20   Suleman Blackwell up to date:11/24/20      *RX updated to reflect   11/24/20  fill date*

## 2020-12-15 ENCOUNTER — TELEMEDICINE (OUTPATIENT)
Dept: NEUROLOGY | Age: 40
End: 2020-12-15
Payer: COMMERCIAL

## 2020-12-15 DIAGNOSIS — G47.419 NARCOLEPSY WITHOUT CATAPLEXY: Primary | ICD-10-CM

## 2020-12-15 DIAGNOSIS — G47.33 OBSTRUCTIVE SLEEP APNEA: ICD-10-CM

## 2020-12-15 PROCEDURE — G8427 DOCREV CUR MEDS BY ELIG CLIN: HCPCS | Performed by: PSYCHIATRY & NEUROLOGY

## 2020-12-15 PROCEDURE — 99213 OFFICE O/P EST LOW 20 MIN: CPT | Performed by: PSYCHIATRY & NEUROLOGY

## 2020-12-15 NOTE — PROGRESS NOTES
[facility]rology [facility]  [address] [facility], [address] [place] [address] [address]  [facility] [address]  Phone [phone]  Fax [phone]     [facility] Sleep Virtual Video Follow Up Encounter  12/15/20 3:06 PM CST  The Following was conducted as a Telehealth Evaluation - Audio/Visual (during the [place] [address] public health emergency)    Information:   Patient Name: Patel Hayes  :   1980  Age:   38 y.o. MRN:   [id number]  Account #:  [de-identified]  Today:  12/15/20    Provider: Luz Wall M.D. Patient Location: Home  Provider Location: The provider is located at [facility] in [place], [place]  Also Present:  No one    Chief Complaint:   Chief Complaint   Patient presents with    6 Month Follow-Up       Subjective:   Patel Hayes is a 38 y.o. man with a history of narcolepsy and obstructive sleep apnea who is following up. He is doing well. He is working from home. He has tried Ritalin, Dexadrine, Xyrem, and Provigil in the past. Ruby Rivas is presently on Adderal 60 mg a day. Ruby Rivas is prescribed CPAP at 14cm for his SARY.  He uses it every night. Ruby Rivas has no insomnia. Ruby Rivas is getting more sleep at night lately and that has helped his daytime drowsiness.        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 No pertinent surgical history. Recent Hospitalizations  · None    Significant Injuries  · None    Habits  Patel Hayes reports that he has never smoked. He has never used smokeless tobacco. He reports current alcohol use. He reports that he does not use drugs.     Family History   Problem Relation Age of Onset    Diabetes Mother     Hypertension Mother     Asthma Mother     Coronary Art Dis Father     Heart Attack Father    Collette Satchel Hypertension Father     Cancer Maternal Grandmother         kidney    Stroke Paternal Grandfather     Cancer Paternal Grandfather         lung       Social History  Janeth Hem , live in [address], and works as an  at [employer].    Medications:  Current Outpatient Medications   Medication Sig Dispense Refill    amphetamine-dextroamphetamine (ADDERALL XR) 30 MG extended release capsule Take 1 capsule by mouth 2 times daily for 30 days. 60 capsule 0    irbesartan (AVAPRO) 150 MG tablet TAKE 1 TABLET BY MOUTH EVERY DAY 90 tablet 1    albuterol sulfate  (90 Base) MCG/ACT inhaler TAKE 2 PUFFS BY MOUTH EVERY 6 HOURS AS NEEDED FOR WHEEZE 6.7 Inhaler 0     No current facility-administered medications for this visit. Allergies:   Allergies as of 12/15/2020    (No Known Allergies)       Review of Systems:  Constitutional: negative for - chills and fever  Eyes:  negative for - visual disturbance and photophobia  HENMT: negative for - headaches and sinus pain  Respiratory: negative for - cough, hemoptysis, and shortness of breath  Cardiovascular: negative for - chest pain and palpitations  Gastrointestinal: negative for - blood in stools, constipation, diarrhea, nausea, and vomiting  Genito-Urinary: negative for - hematuria, urinary frequency, urinary urgency, and urinary retention  Musculoskeletal: negative for - joint pain, joint stiffness, and joint swelling  Hematological and Lymphatic: negative for - bleeding problems, abnormal bruising, and swollen lymph nodes  Endocrine:  negative for - polydipsia and polyphagia  Allergy/Immunology:  negative for - rhinorrhea, sinus congestion, hives  Integument:  negative for - negative for - rash, change in moles, new or changing lesions  Psychological: negative for - anxiety and depression  Neurological: negative for - memory loss, numbness/tingling, and weakness     Physical Examination:  Vitals:  (As obtained by patient/caregiver or practitioner observation). Not taken/available if data not entered. BP -  P -  R -  T -  PO -    General appearance:  Alert, well developed, well nourished, in no distress  HEENT:  normocephalic, atraumatic, sclera appear normal, no observable or audible rhinorrhea, Ears appear normal, oral mucous membranes are moist without erythema, trachea appears midline, no observable anterior neck masses  Cardiovascular:  No observable peripheral edema, No observable cyanosis or clubbing. Pulmonary:  Breathing appears normal, good expansion, normal effort without use of accessory muscles  Musculoskeletal:  Joints - appear normal.  No splints, slings, or casts. Spine appears normal with normal posture and range of motion. Integument:  No rash, erythema, or pallor on visible skin  Psychiatric:  Mood, affect, and behavior appear normal    Neurologic:  Mental Status:  alert, oriented to person, place, and time. Speech:  Clear without dysarthria or dysphonia  Language:  Fluent without aphasia  Cranial Nerves:   III,IV, VI Extraocular movements are full   VII Facial movements are symmetrical without weakness   VIII Hearing is intact   XII No tongue atrophy or fasciculations. Normal tongue protrusion. No tongue weakness  Motor:  No evident muscle atrophy. No gross muscle weakness noted. No tremor noted. Coordination:  Rapid alternating movements are normal in both upper and lower extremities. Extension nose testing is unimpaired bilaterally. Gait:  Normal station and gait. Pertinent Diagnostic Studies:  DAYNA is appropriate    Assessment:       ICD-10-CM    1. Narcolepsy without cataplexy  G47.419    2. Obstructive sleep apnea  G47.33    I explained the pathophysiology of narcolepsys/idiopathic hypersomnolence with Margoth Inch including the proposed mechanism of hypocretin/orexin dysfunction.  I discussed symptoms of narcolepsys/idiopathic hypersomnolence with Margoth Inch including excessive drowsiness even with adequate amount of nighttime sleep, unintentional sleep attacks, need for naps, poor attention, poor concentration, hypnogogic hallucinations, sleep paralysis, and cataplexy. I informed Beverley Oneill of risks from narcolepsys/idiopathic hypersomnolence including accidents, injuries, poor work productivity, and social issues. In addition, I explained treatment options which include adequate sleep, strategic naps, stimulant medications, the use of Xyem, and avoidence of exacerbating medications and situations. I discussed the diagnosis of obstructive sleep apnea with Beverley Oneill including the pathophysiology (namely the mechanism of breathing and obstruction of upper airway, interruptions of sleep, hypoxemia, hypercapnia, and results of repetitive sympathetic activation), risks, evaluation, and treatment options. I discussed the risks of driving when drowsy and advised that Beverley Oneill not drive when drowsy and avoid sedating medications and respiratory suppressants. Plan:   1. Continue CPAP use during sleep. He can get a new auto CPAP whenever he needs. 2.         Continue Adderall  3.         FU in 6 months    Pursuant to the emergency declaration under the [facility] waiver authority and the "[employer] and [employer] Act, this Virtual  Visit was conducted, with patient's consent, to reduce the patient's risk of exposure to COVID-19 and provide continuity of care for an established patient. Services were provided through a video synchronous discussion virtually to substitute for in-person clinic visit.      Electronically signed by Ángel Virgen MD on 12/15/20

## 2021-01-03 DIAGNOSIS — G47.419 NARCOLEPSY WITHOUT CATAPLEXY: ICD-10-CM

## 2021-01-04 RX ORDER — DEXTROAMPHETAMINE SACCHARATE, AMPHETAMINE ASPARTATE MONOHYDRATE, DEXTROAMPHETAMINE SULFATE AND AMPHETAMINE SULFATE 7.5; 7.5; 7.5; 7.5 MG/1; MG/1; MG/1; MG/1
30 CAPSULE, EXTENDED RELEASE ORAL 2 TIMES DAILY
Qty: 60 CAPSULE | Refills: 0 | Status: SHIPPED | OUTPATIENT
Start: 2021-01-04 | End: 2021-02-01 | Stop reason: SDUPTHER

## 2021-01-04 NOTE — TELEPHONE ENCOUNTER
Requested Prescriptions     Pending Prescriptions Disp Refills    amphetamine-dextroamphetamine (ADDERALL XR) 30 MG extended release capsule 60 capsule 0     Sig: Take 1 capsule by mouth 2 times daily for 30 days.        Last Office Visit:  12/15/2020  Next Office Visit:  6/22/2021  Last Medication Refill:  11/24/2020  Jeb Aver up to date:  11/24/2020    *RX updated to reflect   1/4/2021  fill date*

## 2021-02-01 DIAGNOSIS — G47.419 NARCOLEPSY WITHOUT CATAPLEXY: ICD-10-CM

## 2021-02-02 RX ORDER — DEXTROAMPHETAMINE SACCHARATE, AMPHETAMINE ASPARTATE MONOHYDRATE, DEXTROAMPHETAMINE SULFATE AND AMPHETAMINE SULFATE 7.5; 7.5; 7.5; 7.5 MG/1; MG/1; MG/1; MG/1
30 CAPSULE, EXTENDED RELEASE ORAL 2 TIMES DAILY
Qty: 60 CAPSULE | Refills: 0 | Status: SHIPPED | OUTPATIENT
Start: 2021-02-03 | End: 2021-03-04 | Stop reason: SDUPTHER

## 2021-02-02 NOTE — TELEPHONE ENCOUNTER
Requested Prescriptions     Pending Prescriptions Disp Refills    amphetamine-dextroamphetamine (ADDERALL XR) 30 MG extended release capsule 60 capsule 0     Sig: Take 1 capsule by mouth 2 times daily for 30 days.        Last Office Visit:  12/15/2020  Next Office Visit:  6/22/21  Last Medication Refill: 1/4/21   Patel Mcneal up to date:  2/2/21    *RX updated to reflect   2/3/21  fill date*    Barrington gutierrez

## 2021-02-08 ENCOUNTER — TELEPHONE (OUTPATIENT)
Dept: NEUROLOGY | Age: 41
End: 2021-02-08

## 2021-02-08 NOTE — TELEPHONE ENCOUNTER
The patient called and stated that he needed a prior authorization on his Adderall. I submitted it right away and it is approved. I called the patient back and let him know. Laura ORR Case ID: [id number] Need help? Call us at [phone]   Outcome   Approvedtoday   Your PA request has been approved. Additional information will be provided in the approval communication.  (Message 1143)   DrugAdderall XR 30MG er capsules   FormCare[employer] Electronic PA Form (NCPDP)

## 2021-02-28 RX ORDER — IRBESARTAN 150 MG/1
TABLET ORAL
Qty: 90 TABLET | Refills: 1 | Status: SHIPPED | OUTPATIENT
Start: 2021-02-28 | End: 2021-08-31

## 2021-03-04 DIAGNOSIS — G47.419 NARCOLEPSY WITHOUT CATAPLEXY: ICD-10-CM

## 2021-03-05 RX ORDER — DEXTROAMPHETAMINE SACCHARATE, AMPHETAMINE ASPARTATE MONOHYDRATE, DEXTROAMPHETAMINE SULFATE AND AMPHETAMINE SULFATE 7.5; 7.5; 7.5; 7.5 MG/1; MG/1; MG/1; MG/1
30 CAPSULE, EXTENDED RELEASE ORAL 2 TIMES DAILY
Qty: 60 CAPSULE | Refills: 0 | Status: SHIPPED | OUTPATIENT
Start: 2021-03-05 | End: 2021-04-07 | Stop reason: SDUPTHER

## 2021-03-05 NOTE — TELEPHONE ENCOUNTER
Shiv Sat called to request a refill on his medication. Last office visit : 9/22/2020   Next office visit : 3/29/2021     Last UDS: No results found for: Verner Wilma, LABBENZ, BUPRENUR, COCAIMETSCRU, GABAPENTIN, MDMA, METAMPU, OPIATESCREENURINE, OXTCOSU, PHENCYCLIDINESCREENURINE, PROPOXYPHENE, THCSCREENUR, TRICYUR    Last Fer: 3-5-21  Medication Contract: 12-17-19   Last Fill: 2-3-21    Requested Prescriptions     Pending Prescriptions Disp Refills    amphetamine-dextroamphetamine (ADDERALL XR) 30 MG extended release capsule 60 capsule 0     Sig: Take 1 capsule by mouth 2 times daily for 30 days. Please approve or refuse this medication.    Sherren Schmid, MA

## 2021-05-18 ENCOUNTER — OFFICE VISIT (OUTPATIENT)
Dept: FAMILY MEDICINE CLINIC | Age: 41
End: 2021-05-18
Payer: COMMERCIAL

## 2021-05-18 VITALS
DIASTOLIC BLOOD PRESSURE: 88 MMHG | HEIGHT: 69 IN | OXYGEN SATURATION: 96 % | WEIGHT: 315 LBS | HEART RATE: 98 BPM | BODY MASS INDEX: 46.65 KG/M2 | TEMPERATURE: 97 F | SYSTOLIC BLOOD PRESSURE: 134 MMHG

## 2021-05-18 DIAGNOSIS — G47.419 NARCOLEPSY WITHOUT CATAPLEXY: ICD-10-CM

## 2021-05-18 DIAGNOSIS — R73.9 HYPERGLYCEMIA: ICD-10-CM

## 2021-05-18 DIAGNOSIS — E78.01 FAMILIAL HYPERCHOLESTEROLEMIA: ICD-10-CM

## 2021-05-18 DIAGNOSIS — R73.9 HYPERGLYCEMIA: Primary | ICD-10-CM

## 2021-05-18 DIAGNOSIS — G47.33 OSA ON CPAP: ICD-10-CM

## 2021-05-18 DIAGNOSIS — E11.9 TYPE 2 DIABETES MELLITUS WITHOUT COMPLICATION, WITHOUT LONG-TERM CURRENT USE OF INSULIN (HCC): ICD-10-CM

## 2021-05-18 DIAGNOSIS — I10 ESSENTIAL (PRIMARY) HYPERTENSION: ICD-10-CM

## 2021-05-18 DIAGNOSIS — J20.9 ACUTE BRONCHITIS, UNSPECIFIED ORGANISM: Primary | ICD-10-CM

## 2021-05-18 DIAGNOSIS — Z99.89 OSA ON CPAP: ICD-10-CM

## 2021-05-18 LAB
ALBUMIN SERPL-MCNC: 4 G/DL (ref 3.5–5.2)
ALP BLD-CCNC: 82 U/L (ref 40–130)
ALT SERPL-CCNC: 55 U/L (ref 5–41)
ANION GAP SERPL CALCULATED.3IONS-SCNC: 9 MMOL/L (ref 7–19)
AST SERPL-CCNC: 32 U/L (ref 5–40)
BASOPHILS ABSOLUTE: 0.1 K/UL (ref 0–0.2)
BASOPHILS RELATIVE PERCENT: 0.9 % (ref 0–1)
BILIRUB SERPL-MCNC: 0.4 MG/DL (ref 0.2–1.2)
BUN BLDV-MCNC: 10 MG/DL (ref 6–20)
CALCIUM SERPL-MCNC: 9 MG/DL (ref 8.6–10)
CHLORIDE BLD-SCNC: 102 MMOL/L (ref 98–111)
CHOLESTEROL, TOTAL: 197 MG/DL (ref 160–199)
CO2: 29 MMOL/L (ref 22–29)
CREAT SERPL-MCNC: 0.8 MG/DL (ref 0.5–1.2)
CREATININE URINE: 267.6 MG/DL (ref 4.2–622)
EOSINOPHILS ABSOLUTE: 0.2 K/UL (ref 0–0.6)
EOSINOPHILS RELATIVE PERCENT: 2.2 % (ref 0–5)
GFR AFRICAN AMERICAN: >59
GFR NON-AFRICAN AMERICAN: >60
GLUCOSE BLD-MCNC: 151 MG/DL (ref 74–109)
HBA1C MFR BLD: 7.6 % (ref 4–6)
HCT VFR BLD CALC: 48.4 % (ref 42–52)
HDLC SERPL-MCNC: 41 MG/DL (ref 55–121)
HEMOGLOBIN: 15.7 G/DL (ref 14–18)
IMMATURE GRANULOCYTES #: 0.1 K/UL
LDL CHOLESTEROL CALCULATED: 129 MG/DL
LYMPHOCYTES ABSOLUTE: 3.6 K/UL (ref 1.1–4.5)
LYMPHOCYTES RELATIVE PERCENT: 39.3 % (ref 20–40)
MCH RBC QN AUTO: 29.2 PG (ref 27–31)
MCHC RBC AUTO-ENTMCNC: 32.4 G/DL (ref 33–37)
MCV RBC AUTO: 90.1 FL (ref 80–94)
MICROALBUMIN UR-MCNC: 2 MG/DL (ref 0–19)
MICROALBUMIN/CREAT UR-RTO: 7.5 MG/G
MONOCYTES ABSOLUTE: 0.8 K/UL (ref 0–0.9)
MONOCYTES RELATIVE PERCENT: 8.8 % (ref 0–10)
NEUTROPHILS ABSOLUTE: 4.4 K/UL (ref 1.5–7.5)
NEUTROPHILS RELATIVE PERCENT: 47.9 % (ref 50–65)
PDW BLD-RTO: 13.9 % (ref 11.5–14.5)
PLATELET # BLD: 305 K/UL (ref 130–400)
PMV BLD AUTO: 10.4 FL (ref 9.4–12.4)
POTASSIUM SERPL-SCNC: 4.4 MMOL/L (ref 3.5–5)
RBC # BLD: 5.37 M/UL (ref 4.7–6.1)
SODIUM BLD-SCNC: 140 MMOL/L (ref 136–145)
TOTAL PROTEIN: 7.2 G/DL (ref 6.6–8.7)
TRIGL SERPL-MCNC: 134 MG/DL (ref 0–149)
WBC # BLD: 9.2 K/UL (ref 4.8–10.8)

## 2021-05-18 PROCEDURE — 1036F TOBACCO NON-USER: CPT | Performed by: FAMILY MEDICINE

## 2021-05-18 PROCEDURE — 3051F HG A1C>EQUAL 7.0%<8.0%: CPT | Performed by: FAMILY MEDICINE

## 2021-05-18 PROCEDURE — 99214 OFFICE O/P EST MOD 30 MIN: CPT | Performed by: FAMILY MEDICINE

## 2021-05-18 PROCEDURE — G8417 CALC BMI ABV UP PARAM F/U: HCPCS | Performed by: FAMILY MEDICINE

## 2021-05-18 PROCEDURE — G8427 DOCREV CUR MEDS BY ELIG CLIN: HCPCS | Performed by: FAMILY MEDICINE

## 2021-05-18 PROCEDURE — 2022F DILAT RTA XM EVC RTNOPTHY: CPT | Performed by: FAMILY MEDICINE

## 2021-05-18 RX ORDER — AZITHROMYCIN 250 MG/1
TABLET, FILM COATED ORAL
Qty: 1 PACKET | Refills: 0 | Status: SHIPPED | OUTPATIENT
Start: 2021-05-18 | End: 2021-06-22

## 2021-05-18 ASSESSMENT — ENCOUNTER SYMPTOMS
NAUSEA: 0
CONSTIPATION: 0
SHORTNESS OF BREATH: 1
COUGH: 1
DIARRHEA: 0
ABDOMINAL PAIN: 0
CHEST TIGHTNESS: 0
ANAL BLEEDING: 0

## 2021-05-18 NOTE — PROGRESS NOTES
[facility] PHYSICIAN SERVICES  [facility] FAMILY MEDICINE  [address] [facility] [address] [address]  Dept: [phone]  Dept Fax: [phone]  Loc: [phone]    Oksana Briceño is a 38 y.o. male who presents today for his medical conditions/complaints as noted below. Oksana Briceño is here for 6 Month Follow-Up and Cough (x3 days)        HPI:   CC: Here today to discuss the followin-year-old here for follow-up. He missed his laboratory appointment. Continues to see neurology for history of obstructive sleep apnea. He is also on Adderall for narcolepsy. Hypertension  Compliant with medications. No adverse effects from medication. No lightheadedness, palpitations, or chest pain. Tells me he has had a productive cough for the past 3 days. He has had bilateral maxillary sinus pressure and postnasal drainage. No fever chills. No nausea or vomiting. No loss of sense of taste or smell. 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     Family History   Problem Relation Age of Onset    Diabetes Mother     Hypertension Mother     Asthma Mother     Coronary Art Dis Father     Heart Attack Father     Hypertension Father     Cancer Maternal Grandmother         kidney    Stroke Paternal Grandfather     Cancer Paternal Grandfather         lung       Social History     Tobacco Use    Smoking status: Never Smoker    Smokeless tobacco: Never Used   Substance Use Topics    Alcohol use: Yes     Comment: twice a year social     Current Outpatient Medications   Medication Sig Dispense Refill    azithromycin (ZITHROMAX) 250 MG tablet Take 2 tabs (500 mg) on Day 1, and take 1 tab (250 mg) on days 2 through 5. 1 packet 0    amphetamine-dextroamphetamine (ADDERALL XR) 30 MG extended release capsule Take 1 capsule by mouth 2 times daily for 30 days. 60 capsule 0    irbesartan (AVAPRO) 150 MG tablet TAKE 1 TABLET BY MOUTH EVERY DAY 90 tablet 1    metFORMIN (GLUCOPHAGE) 500 MG tablet Take 1 tablet by mouth 2 times daily (with meals) 60 tablet 5    albuterol sulfate  (90 Base) MCG/ACT inhaler TAKE 2 PUFFS BY MOUTH EVERY 6 HOURS AS NEEDED FOR WHEEZE (Patient not taking: Reported on 5/18/2021) 6.7 Inhaler 0     No current facility-administered medications for this visit. No Known Allergies    Health Maintenance   Topic Date Due    Hepatitis C screen  Never done    Diabetic foot exam  Never done    Diabetic retinal exam  Never done    COVID-19 Vaccine (1) Never done    HIV screen  Never done    DTaP/Tdap/Td vaccine (1 - Tdap) 01/01/2022 (Originally 11/6/1998)    Flu vaccine (Season Ended) 09/01/2021    A1C test (Diabetic or Prediabetic)  05/18/2022    Diabetic microalbuminuria test  05/18/2022    Lipid screen  05/18/2022    Potassium monitoring  05/18/2022    Creatinine monitoring  05/18/2022    Hepatitis A vaccine  Aged Out    Hepatitis B vaccine  Aged Out    Hib vaccine  Aged Out    Meningococcal (ACWY) vaccine  Aged Out    Pneumococcal 0-64 years Vaccine  Aged Out       Subjective:      Review of Systems   Constitutional: Negative for chills and fever. HENT: Negative for congestion. Respiratory: Positive for cough and shortness of breath. Negative for chest tightness. Cardiovascular: Negative for chest pain, palpitations and leg swelling. Gastrointestinal: Negative for abdominal pain, anal bleeding, constipation, diarrhea and nausea. Genitourinary: Negative for difficulty urinating. Psychiatric/Behavioral: Negative. SeeHPI for visit specific review of symptoms.   All others negative      Objective:   /88   Pulse 98   Temp 97 °F (36.1 °C)   Ht 5' 9\" (1.753 m) Wt (!) 370 lb (167.8 kg)   SpO2 96%   BMI 54.64 kg/m²   Physical Exam  Constitutional:       Appearance: He is well-developed. He is not ill-appearing. Eyes:      Pupils: Pupils are equal, round, and reactive to light. Cardiovascular:      Rate and Rhythm: Normal rate and regular rhythm. Heart sounds: No murmur heard. No friction rub. Pulmonary:      Effort: Pulmonary effort is normal. No respiratory distress. Breath sounds: Normal breath sounds. No wheezing or rales. Abdominal:      General: Bowel sounds are normal. There is no distension. Palpations: Abdomen is soft. Tenderness: There is no abdominal tenderness. There is no guarding or rebound. Musculoskeletal:      Cervical back: Normal range of motion and neck supple. Neurological:      Mental Status: He is alert.    Psychiatric:         Behavior: Behavior normal.           Recent Results (from the past 672 hour(s))   Comprehensive Metabolic Panel    Collection Time: 05/18/21 10:25 AM   Result Value Ref Range    Sodium 140 136 - 145 mmol/L    Potassium 4.4 3.5 - 5.0 mmol/L    Chloride 102 98 - 111 mmol/L    CO2 29 22 - 29 mmol/L    Anion Gap 9 7 - 19 mmol/L    Glucose 151 (H) 74 - 109 mg/dL    BUN 10 6 - 20 mg/dL    CREATININE 0.8 0.5 - 1.2 mg/dL    GFR Non-African American >60 >60    GFR African American >59 >59    Calcium 9.0 8.6 - 10.0 mg/dL    Total Protein 7.2 6.6 - 8.7 g/dL    Albumin 4.0 3.5 - 5.2 g/dL    Total Bilirubin 0.4 0.2 - 1.2 mg/dL    Alkaline Phosphatase 82 40 - 130 U/L    ALT 55 (H) 5 - 41 U/L    AST 32 5 - 40 U/L   Lipid Panel    Collection Time: 05/18/21 10:25 AM   Result Value Ref Range    Cholesterol, Total 197 160 - 199 mg/dL    Triglycerides 134 0 - 149 mg/dL    HDL 41 (L) 55 - 121 mg/dL    LDL Calculated 129 <100 mg/dL   Hemoglobin A1C    Collection Time: 05/18/21 10:25 AM   Result Value Ref Range    Hemoglobin A1C 7.6 (H) 4.0 - 6.0 %   Microalbumin / Creatinine Urine Ratio    Collection Time: 05/18/21 10:25 AM   Result Value Ref Range    Microalbumin, Random Urine 2.00 0.00 - 19.00 mg/dL    Creatinine, Ur 267.6 4.2 - 622.0 mg/dL    Microalbumin Creatinine Ratio 7.5 mg/g   CBC Auto Differential    Collection Time: 05/18/21 10:25 AM   Result Value Ref Range    WBC 9.2 4.8 - 10.8 K/uL    RBC 5.37 4.70 - 6.10 M/uL    Hemoglobin 15.7 14.0 - 18.0 g/dL    Hematocrit 48.4 42.0 - 52.0 %    MCV 90.1 80.0 - 94.0 fL    MCH 29.2 27.0 - 31.0 pg    MCHC 32.4 (L) 33.0 - 37.0 g/dL    RDW 13.9 11.5 - 14.5 %    Platelets 150 883 - 533 K/uL    MPV 10.4 9.4 - 12.4 fL    Neutrophils % 47.9 (L) 50.0 - 65.0 %    Lymphocytes % 39.3 20.0 - 40.0 %    Monocytes % 8.8 0.0 - 10.0 %    Eosinophils % 2.2 0.0 - 5.0 %    Basophils % 0.9 0.0 - 1.0 %    Neutrophils Absolute 4.4 1.5 - 7.5 K/uL    Immature Granulocytes # 0.1 K/uL    Lymphocytes Absolute 3.6 1.1 - 4.5 K/uL    Monocytes Absolute 0.80 0.00 - 0.90 K/uL    Eosinophils Absolute 0.20 0.00 - 0.60 K/uL    Basophils Absolute 0.10 0.00 - 0.20 K/uL               Assessment & Plan: The following diagnoses and conditions are stable with no further orders unless indicated:    1. Acute bronchitis, unspecified organism  Recommend albuterol 2 puffs 3 times daily for 5 days  Z-Shahid  Call clinic if symptoms worsen  - azithromycin (ZITHROMAX) 250 MG tablet; Take 2 tabs (500 mg) on Day 1, and take 1 tab (250 mg) on days 2 through 5. Dispense: 1 packet; Refill: 0    2. Narcolepsy without cataplexy  Continue with recommendations per neurology    3. Essential (primary) hypertension  BP Readings from Last 3 Encounters:   05/18/21 134/88   02/18/20 (!) 134/92   02/12/20 128/84     Blood pressure is borderline elevated. Reviewed DASH diet with him.     4. Familial hypercholesterolemia  Lab Results   Component Value Date    CHOL 197 05/18/2021    CHOL 213 (H) 02/13/2020    CHOL 207 (H) 05/21/2019     Lab Results   Component Value Date    TRIG 134 05/18/2021    TRIG 409 (H) 02/13/2020    TRIG 286 (H) 05/21/2019     Lab Results   Component Value Date    HDL 41 (L) 05/18/2021    HDL 43 (L) 02/13/2020    HDL 38 (L) 05/21/2019     Lab Results   Component Value Date    LDLCALC 129 05/18/2021    LDLCALC see below 02/13/2020    LDLCALC 112 05/21/2019     No results found for: LABVLDL, VLDL  No results found for: CHOLHDLRATIO  His cholesterol came back after he had left. Triglycerides have improved however LDL not at goal.  Reviewed dietary recommendations. We discussed initiating statins at next visit  Recheck lipids next visit  5. Hyperglycemia  Discussed lifestyle changes such as diet and exercise. Recommended eliminate processed food from diet such as sugar and fried foods. Recommended exercising at least 150 minutes/week. Try to do full body resistance training twice a week as well. Offered suggestions for calorie counting such as phone apps and online resources such as My fitness pal and Lose it. Discussed healthy weight. 6. SARY on CPAP  Compliant and satisfied with results    7. Type 2 diabetes mellitus without complication, without long-term current use of insulin (HCC)  Start Metformin 500 mg twice a day  Recheck A1c next visit        Return in about 3 months (around 8/18/2021). Discussed use, benefit, and side effects of prescribed medications. All patient questions answered. Pt voiced understanding. Reviewed health maintenance. Instructedto continue current medications, diet and exercise. Patient agreed with treatmentplan. Follow up as directed. Note dictated using Dragon Dictation software  Sometimes this dictation software makes erroneous transcriptions.

## 2021-05-24 DIAGNOSIS — G47.419 NARCOLEPSY WITHOUT CATAPLEXY: ICD-10-CM

## 2021-05-24 NOTE — TELEPHONE ENCOUNTER
Lina Patricia called to request a refill on his medication. Last office visit : 5/18/2021   Next office visit : 9/16/2021     Last UDS: No results found for: Fowler Rack, LABBENZ, BUPRENUR, COCAIMETSCRU, GABAPENTIN, MDMA, METAMPU, OPIATESCREENURINE, OXTCOSU, PHENCYCLIDINESCREENURINE, PROPOXYPHENE, THCSCREENUR, TRICYUR    Last Fer: 3-5-21  Medication Contract: Due   Last Fill: 4-7-21    Requested Prescriptions     Pending Prescriptions Disp Refills    amphetamine-dextroamphetamine (ADDERALL XR) 30 MG extended release capsule 60 capsule 0     Sig: Take 1 capsule by mouth 2 times daily for 30 days. Please approve or refuse this medication.    Tj Espana MA

## 2021-05-25 DIAGNOSIS — G47.419 NARCOLEPSY WITHOUT CATAPLEXY: ICD-10-CM

## 2021-05-25 RX ORDER — DEXTROAMPHETAMINE SACCHARATE, AMPHETAMINE ASPARTATE MONOHYDRATE, DEXTROAMPHETAMINE SULFATE AND AMPHETAMINE SULFATE 7.5; 7.5; 7.5; 7.5 MG/1; MG/1; MG/1; MG/1
30 CAPSULE, EXTENDED RELEASE ORAL 2 TIMES DAILY
Qty: 60 CAPSULE | Refills: 0 | OUTPATIENT
Start: 2021-05-25 | End: 2021-06-24

## 2021-05-25 NOTE — TELEPHONE ENCOUNTER
I keep receiving refill requests for this. I don't prescribe this for him. He gets it from Dr Chrissy Driscoll. Can he call dr Chrissy Driscoll to get his refill. Thanks.

## 2021-05-25 NOTE — TELEPHONE ENCOUNTER
Requested Prescriptions     Pending Prescriptions Disp Refills    amphetamine-dextroamphetamine (ADDERALL XR) 30 MG extended release capsule 60 capsule 0     Sig: Take 1 capsule by mouth 2 times daily for 30 days.        Last Office Visit:  12/15/20  Next Office Visit:  6/22/2021  Last Medication Refill:  4/7/21  Joy Villatoro up to date:  3/5/21    *RX updated to reflect   5/25/21  fill date*

## 2021-05-26 RX ORDER — DEXTROAMPHETAMINE SACCHARATE, AMPHETAMINE ASPARTATE MONOHYDRATE, DEXTROAMPHETAMINE SULFATE AND AMPHETAMINE SULFATE 7.5; 7.5; 7.5; 7.5 MG/1; MG/1; MG/1; MG/1
30 CAPSULE, EXTENDED RELEASE ORAL 2 TIMES DAILY
Qty: 60 CAPSULE | Refills: 0 | Status: SHIPPED | OUTPATIENT
Start: 2021-05-26 | End: 2021-06-22 | Stop reason: SDUPTHER

## 2021-06-22 ENCOUNTER — OFFICE VISIT (OUTPATIENT)
Dept: NEUROLOGY | Age: 41
End: 2021-06-22
Payer: COMMERCIAL

## 2021-06-22 VITALS
HEART RATE: 106 BPM | HEIGHT: 69 IN | WEIGHT: 315 LBS | DIASTOLIC BLOOD PRESSURE: 88 MMHG | RESPIRATION RATE: 18 BRPM | BODY MASS INDEX: 46.65 KG/M2 | SYSTOLIC BLOOD PRESSURE: 139 MMHG

## 2021-06-22 DIAGNOSIS — G47.33 OBSTRUCTIVE SLEEP APNEA: Primary | ICD-10-CM

## 2021-06-22 DIAGNOSIS — G47.419 NARCOLEPSY WITHOUT CATAPLEXY: ICD-10-CM

## 2021-06-22 PROCEDURE — G8427 DOCREV CUR MEDS BY ELIG CLIN: HCPCS | Performed by: PSYCHIATRY & NEUROLOGY

## 2021-06-22 PROCEDURE — 99214 OFFICE O/P EST MOD 30 MIN: CPT | Performed by: PSYCHIATRY & NEUROLOGY

## 2021-06-22 PROCEDURE — G8417 CALC BMI ABV UP PARAM F/U: HCPCS | Performed by: PSYCHIATRY & NEUROLOGY

## 2021-06-22 PROCEDURE — 1036F TOBACCO NON-USER: CPT | Performed by: PSYCHIATRY & NEUROLOGY

## 2021-06-22 RX ORDER — DEXTROAMPHETAMINE SACCHARATE, AMPHETAMINE ASPARTATE MONOHYDRATE, DEXTROAMPHETAMINE SULFATE AND AMPHETAMINE SULFATE 7.5; 7.5; 7.5; 7.5 MG/1; MG/1; MG/1; MG/1
30 CAPSULE, EXTENDED RELEASE ORAL 2 TIMES DAILY
Qty: 60 CAPSULE | Refills: 0 | Status: SHIPPED | OUTPATIENT
Start: 2021-06-22 | End: 2021-07-26

## 2021-06-22 NOTE — PROGRESS NOTES
Sig Dispense Refill    amphetamine-dextroamphetamine (ADDERALL XR) 30 MG extended release capsule Take 1 capsule by mouth 2 times daily for 30 days. 60 capsule 0    metFORMIN (GLUCOPHAGE) 500 MG tablet Take 1 tablet by mouth 2 times daily (with meals) 60 tablet 5    irbesartan (AVAPRO) 150 MG tablet TAKE 1 TABLET BY MOUTH EVERY DAY 90 tablet 1    albuterol sulfate  (90 Base) MCG/ACT inhaler TAKE 2 PUFFS BY MOUTH EVERY 6 HOURS AS NEEDED FOR WHEEZE 6.7 Inhaler 0     No current facility-administered medications for this visit. Allergies:   Allergies as of 06/22/2021    (No Known Allergies)       Review of Systems:  Constitutional: negative for - chills and fever  Eyes:  negative for - visual disturbance and photophobia  HENMT: negative for - headaches and sinus pain  Respiratory: negative for - cough, hemoptysis, and shortness of breath  Cardiovascular: negative for - chest pain and palpitations  Gastrointestinal: negative for - blood in stools, constipation, diarrhea, nausea, and vomiting  Genito-Urinary: negative for - hematuria, urinary frequency, urinary urgency, and urinary retention  Musculoskeletal: negative for - joint pain, joint stiffness, and joint swelling  Hematological and Lymphatic: negative for - bleeding problems, abnormal bruising, and swollen lymph nodes  Endocrine:  negative for - polydipsia and polyphagia  Allergy/Immunology:  negative for - rhinorrhea, sinus congestion, hives  Integument:  negative for - negative for - rash, change in moles, new or changing lesions  Psychological: negative for - anxiety and depression  Neurological: negative for - memory loss, numbness/tingling, and weakness     PHYSICAL EXAMINATION:  Vitals:  /88   Pulse 106   Resp 18   Ht 5' 9\" (1.753 m)   Wt (!) 342 lb (155.1 kg)   BMI 50.50 kg/m²   General appearance:  Alert, well developed, well nourished, in no distress  HEENT:  normocephalic, atraumatic, sclera appear normal, no nasal abnormalities, no rhinorrhea, Ears appear normal, oral mucous membranes are moist without erythema, trachea midline, thyroid is normal, no lymphadenopathy or neck mass. IV (only hard palate visible). Cardiovascular:  Regular rate and rhythm without murmer. No peripheral edema, No cyanosis or clubbing. No carotid bruits. Pulmonary:  Lungs are clear to auscultation. Breathing appears normal, good expansion, normal effort without use of accessory muscles  Musculoskeletal:  Joints are normal.  No splints, slings, or casts. Spine appears normal with normal posture and range of motion. Integument:  No rash, erythema, or pallor  Psychiatric:  Mood, affect, and behavior appear normal      NEUROLOGIC EXAMINATION:  Mental Status:  alert, oriented to person, place, and time. Speech:  Clear without dysarthria or dysphonia  Language:  Fluent without aphasia  Cranial Nerves:   II Visual fields are full to confrontation   III,IV, VI Extraocular movements are full   VII Facial movements are symmetrical without weakness   VIII Hearing is intact   XII No tongue atrophy or fasciculations. Normal tongue protrusion. No tongue weakness  Motor:  Normal strength in both upper and lower extremities. Normal muscle tone and bulk. Deep tendon reflexes are intact and symmetrical in both upper and lower extremities. Rodríguez's signs are absent bilaterally. There is no ankle clonus on either side. Sensation:  Sensation is intact to light touch and vibration in all extremities. Coordination:  Rapid alternating movements are normal in both upper and lower extremities. Finger to nose testing is unimpaired bilaterally. Gait:  Normal station and gait. Pertinent Diagnostic Studies:  DAYNA is appropriate  He had his son send a picture of his machine. It is a Respironics system one at least 8years old. Assessment:       ICD-10-CM    1. Obstructive sleep apnea  G47.33    2.  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 I discussed symptoms of narcolepsys/idiopathic hypersomnolence with Jose Douglass including excessive drowsiness even with adequate amount of nighttime sleep, unintentional sleep attacks, need for naps, poor attention, poor concentration, hypnogogic hallucinations, sleep paralysis, and cataplexy. I informed Jose Douglass of risks from narcolepsys/idiopathic hypersomnolence including accidents, injuries, poor work productivity, and social issues. In addition, I explained treatment options which include adequate sleep, strategic naps, stimulant medications, the use of Xyem, and avoidence of exacerbating medications and situations. I discussed the diagnosis of obstructive sleep apnea with Jose Douglass including the pathophysiology (namely the mechanism of breathing and obstruction of upper airway, interruptions of sleep, hypoxemia, hypercapnia, and results of repetitive sympathetic activation), risks, evaluation, and treatment options. I discussed the risks of driving when drowsy and advised that Jose Douglass not drive when drowsy and avoid sedating medications and respiratory suppressants. I discussed the Respironics recall with him. Plan:   1. Fill Adderall early  2. Needs new CPAP.   I can find no old study so if he needs a new machine, he will need a new study, at least a home sleep apnea test.  3. FU in 6 months    Electronically signed by Jerome Espitia MD on 6/22/21

## 2021-07-25 ENCOUNTER — PATIENT MESSAGE (OUTPATIENT)
Dept: NEUROLOGY | Age: 41
End: 2021-07-25

## 2021-07-25 DIAGNOSIS — G47.419 NARCOLEPSY WITHOUT CATAPLEXY: ICD-10-CM

## 2021-07-26 RX ORDER — DEXTROAMPHETAMINE SACCHARATE, AMPHETAMINE ASPARTATE MONOHYDRATE, DEXTROAMPHETAMINE SULFATE AND AMPHETAMINE SULFATE 7.5; 7.5; 7.5; 7.5 MG/1; MG/1; MG/1; MG/1
30 CAPSULE, EXTENDED RELEASE ORAL 2 TIMES DAILY
Qty: 60 CAPSULE | Refills: 0 | Status: SHIPPED | OUTPATIENT
Start: 2021-07-26 | End: 2021-07-28 | Stop reason: SDUPTHER

## 2021-07-26 NOTE — TELEPHONE ENCOUNTER
Requested Prescriptions     Pending Prescriptions Disp Refills    amphetamine-dextroamphetamine (ADDERALL XR) 30 MG extended release capsule 60 capsule 0     Sig: Take 1 capsule by mouth 2 times daily for 30 days.        Last Office Visit:  6/22/2021  Next Office Visit:  1/4/2022  Last Medication Refill:  6/22/21   Suleman Blackwell up to date: 7/26/21     *RX updated to reflect   7/26/21  fill date*

## 2021-07-28 DIAGNOSIS — G47.419 NARCOLEPSY WITHOUT CATAPLEXY: ICD-10-CM

## 2021-07-28 RX ORDER — DEXTROAMPHETAMINE SACCHARATE, AMPHETAMINE ASPARTATE MONOHYDRATE, DEXTROAMPHETAMINE SULFATE AND AMPHETAMINE SULFATE 7.5; 7.5; 7.5; 7.5 MG/1; MG/1; MG/1; MG/1
30 CAPSULE, EXTENDED RELEASE ORAL 2 TIMES DAILY
Qty: 60 CAPSULE | Refills: 0 | Status: SHIPPED | OUTPATIENT
Start: 2021-07-28 | End: 2021-08-27 | Stop reason: SDUPTHER

## 2021-07-28 NOTE — TELEPHONE ENCOUNTER
Leonor Efraín has requested a refill on his medication. Last office visit : 6/22/2021   Next office visit : 1/4/2022   Last medication refill :6-22-21  Ember Embs : 7-26-21      Requested Prescriptions     Pending Prescriptions Disp Refills    amphetamine-dextroamphetamine (ADDERALL XR) 30 MG extended release capsule 60 capsule 0     Sig: Take 1 capsule by mouth 2 times daily for 30 days.

## 2021-08-26 DIAGNOSIS — G47.419 NARCOLEPSY WITHOUT CATAPLEXY: ICD-10-CM

## 2021-08-26 NOTE — TELEPHONE ENCOUNTER
Requested Prescriptions     Pending Prescriptions Disp Refills    amphetamine-dextroamphetamine (ADDERALL XR) 30 MG extended release capsule 60 capsule 0     Sig: Take 1 capsule by mouth 2 times daily for 30 days.        Last Office Visit:  6-22-21  Next Office Visit:  1-04-22  Last Medication Refill:  7-  Johnson Cullen up to date:  7-26-21    *RX updated to reflect  07- fill date*

## 2021-08-27 RX ORDER — DEXTROAMPHETAMINE SACCHARATE, AMPHETAMINE ASPARTATE MONOHYDRATE, DEXTROAMPHETAMINE SULFATE AND AMPHETAMINE SULFATE 7.5; 7.5; 7.5; 7.5 MG/1; MG/1; MG/1; MG/1
30 CAPSULE, EXTENDED RELEASE ORAL 2 TIMES DAILY
Qty: 60 CAPSULE | Refills: 0 | Status: SHIPPED | OUTPATIENT
Start: 2021-08-27 | End: 2021-10-01 | Stop reason: SDUPTHER

## 2021-08-31 RX ORDER — IRBESARTAN 150 MG/1
TABLET ORAL
Qty: 90 TABLET | Refills: 1 | Status: SHIPPED | OUTPATIENT
Start: 2021-08-31 | End: 2021-09-17

## 2021-09-16 ENCOUNTER — OFFICE VISIT (OUTPATIENT)
Dept: FAMILY MEDICINE CLINIC | Age: 41
End: 2021-09-16
Payer: COMMERCIAL

## 2021-09-16 VITALS
TEMPERATURE: 97.1 F | WEIGHT: 315 LBS | BODY MASS INDEX: 47.11 KG/M2 | DIASTOLIC BLOOD PRESSURE: 90 MMHG | SYSTOLIC BLOOD PRESSURE: 138 MMHG | OXYGEN SATURATION: 99 % | HEART RATE: 83 BPM

## 2021-09-16 DIAGNOSIS — Z99.89 OSA ON CPAP: ICD-10-CM

## 2021-09-16 DIAGNOSIS — R53.83 OTHER FATIGUE: ICD-10-CM

## 2021-09-16 DIAGNOSIS — G47.33 OSA ON CPAP: ICD-10-CM

## 2021-09-16 DIAGNOSIS — E78.01 FAMILIAL HYPERCHOLESTEROLEMIA: ICD-10-CM

## 2021-09-16 DIAGNOSIS — E11.9 TYPE 2 DIABETES MELLITUS WITHOUT COMPLICATION, WITHOUT LONG-TERM CURRENT USE OF INSULIN (HCC): Primary | ICD-10-CM

## 2021-09-16 DIAGNOSIS — I10 ESSENTIAL (PRIMARY) HYPERTENSION: ICD-10-CM

## 2021-09-16 PROCEDURE — 99214 OFFICE O/P EST MOD 30 MIN: CPT | Performed by: FAMILY MEDICINE

## 2021-09-16 PROCEDURE — 3051F HG A1C>EQUAL 7.0%<8.0%: CPT | Performed by: FAMILY MEDICINE

## 2021-09-16 PROCEDURE — 1036F TOBACCO NON-USER: CPT | Performed by: FAMILY MEDICINE

## 2021-09-16 PROCEDURE — 2022F DILAT RTA XM EVC RTNOPTHY: CPT | Performed by: FAMILY MEDICINE

## 2021-09-16 PROCEDURE — G8427 DOCREV CUR MEDS BY ELIG CLIN: HCPCS | Performed by: FAMILY MEDICINE

## 2021-09-16 PROCEDURE — G8417 CALC BMI ABV UP PARAM F/U: HCPCS | Performed by: FAMILY MEDICINE

## 2021-09-16 RX ORDER — IRBESARTAN 300 MG/1
300 TABLET ORAL DAILY
Qty: 90 TABLET | Refills: 3 | Status: SHIPPED | OUTPATIENT
Start: 2021-09-16 | End: 2022-10-21 | Stop reason: SDUPTHER

## 2021-09-16 ASSESSMENT — PATIENT HEALTH QUESTIONNAIRE - PHQ9
1. LITTLE INTEREST OR PLEASURE IN DOING THINGS: 0
SUM OF ALL RESPONSES TO PHQ9 QUESTIONS 1 & 2: 0
2. FEELING DOWN, DEPRESSED OR HOPELESS: 0
SUM OF ALL RESPONSES TO PHQ QUESTIONS 1-9: 0

## 2021-09-16 NOTE — PROGRESS NOTES
[facility] PHYSICIAN SERVICES  [facility]  [id number] [place] [address] [address] [address]  Dept: [phone]  Dept Fax: [phone]  Loc: [phone]    Kenyon Ramos is a 38 y.o. male who presents today for his medical conditions/complaints as noted below. Kenyon Ramos is here for 3 Month Follow-Up        HPI:   CC: Here today to discuss the following:    He has lost 51 pounds since his last visit with diet    Diabetes Mellitus  Has been compliant with medications. No side effects of medications since last visit. No polyuria, polydipsia, or vision changes since last visit. No symptomatic episodes of hypoglycemia. Hyperlipidemia  Tolerating cholesterol medication without adverse effects. Hypertension  Compliant with medications. No adverse effects from medication. No lightheadedness, palpitations, or chest pain. SARY on CPAP  Compliant with CPAP. No daytime somnolence. Feels rested for the most part when wearing CPAP. 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     Family History   Problem Relation Age of Onset    Diabetes Mother     Hypertension Mother     Asthma Mother     Coronary Art Dis Father     Heart Attack Father     Hypertension Father     Cancer Maternal Grandmother         kidney    Stroke Paternal Grandfather     Cancer Paternal Grandfather         lung       Social History     Tobacco Use    Smoking status: Never Smoker    Smokeless tobacco: Never Used   Substance Use Topics    Alcohol use: Yes     Comment: twice a year social     Current Outpatient Medications   Medication Sig Dispense Refill    irbesartan (AVAPRO) 300 MG tablet Take 1 tablet by mouth daily 90 tablet 3    irbesartan (AVAPRO) 150 MG tablet TAKE 1 TABLET BY MOUTH EVERY DAY 90 tablet 1    amphetamine-dextroamphetamine (ADDERALL XR) 30 MG extended release capsule Take 1 capsule by mouth 2 times daily for 30 days. 60 capsule 0    metFORMIN (GLUCOPHAGE) 500 MG tablet Take 1 tablet by mouth 2 times daily (with meals) 60 tablet 5    albuterol sulfate  (90 Base) MCG/ACT inhaler TAKE 2 PUFFS BY MOUTH EVERY 6 HOURS AS NEEDED FOR WHEEZE 6.7 Inhaler 0     No current facility-administered medications for this visit. No Known Allergies    Health Maintenance   Topic Date Due    Hepatitis C screen  Never done    Pneumococcal 0-64 years Vaccine (1 of 2 - PPSV23) Never done    Diabetic foot exam  Never done    Diabetic retinal exam  Never done    COVID-19 Vaccine (1) Never done    HIV screen  Never done    Hepatitis B vaccine (1 of 3 - Risk 3-dose series) Never done    Flu vaccine (1) Never done    DTaP/Tdap/Td vaccine (1 - Tdap) 01/01/2022 (Originally 5/2/2012)    A1C test (Diabetic or Prediabetic)  05/18/2022    Diabetic microalbuminuria test  05/18/2022    Lipid screen  05/18/2022    Potassium monitoring  05/18/2022    Creatinine monitoring  05/18/2022    Hepatitis A vaccine  Aged Out    Hib vaccine  Aged Out    Meningococcal (ACWY) vaccine  Aged Out       Subjective:      Review of Systems    Review of Systems   Constitutional: Negative for chills and fever. HENT: Negative for congestion. Respiratory: Negative for cough, chest tightness and shortness of breath. Cardiovascular: Negative for chest pain, palpitations and leg swelling. Gastrointestinal: Negative for abdominal pain, anal bleeding, constipation, diarrhea and nausea. Genitourinary: Negative for difficulty urinating. Psychiatric/Behavioral: Negative. SeeHPI for visit specific review of symptoms.   All others negative      Objective:   BP (!) 138/90   Pulse 83   Temp 97.1 °F (36.2 °C)   Wt (!) 319 lb (144.7 Component Value Date    LDLCALC 129 05/18/2021    [facility] [employer] see below 02/13/2020    [address] 05/21/2019     No results found for: LABVLDL, VLDL  No results found for: CHOLHDLRATIO  Recheck cholesterol next visit    4. SARY on CPAP  Compliant satisfied with results      States not interested in COVID vaccine. Return in about 6 months (around 3/16/2022) for Routine follow up - 15 minute visit. Discussed use, benefit, and side effects of prescribed medications. All patient questions answered. Pt voiced understanding. Reviewed health maintenance. Instructedto continue current medications, diet and exercise. Patient agreed with treatmentplan. Follow up as directed. Note dictated using Dragon Dictation software  Sometimes this dictation software makes erroneous transcriptions.

## 2021-09-27 ENCOUNTER — HOSPITAL ENCOUNTER (OUTPATIENT)
Dept: GENERAL RADIOLOGY | Age: 41
Discharge: HOME | End: 2021-09-27
Payer: COMMERCIAL

## 2021-09-27 ENCOUNTER — OFFICE VISIT (OUTPATIENT)
Dept: FAMILY MEDICINE CLINIC | Age: 41
End: 2021-09-27
Payer: COMMERCIAL

## 2021-09-27 VITALS
DIASTOLIC BLOOD PRESSURE: 80 MMHG | SYSTOLIC BLOOD PRESSURE: 128 MMHG | OXYGEN SATURATION: 97 % | HEIGHT: 69 IN | HEART RATE: 97 BPM | WEIGHT: 314 LBS | BODY MASS INDEX: 46.51 KG/M2 | TEMPERATURE: 97.6 F | RESPIRATION RATE: 18 BRPM

## 2021-09-27 DIAGNOSIS — R10.9 RIGHT FLANK PAIN: Primary | ICD-10-CM

## 2021-09-27 DIAGNOSIS — M54.6 CHRONIC MIDLINE THORACIC BACK PAIN: ICD-10-CM

## 2021-09-27 DIAGNOSIS — R10.9 RIGHT FLANK PAIN: ICD-10-CM

## 2021-09-27 DIAGNOSIS — G89.29 CHRONIC MIDLINE THORACIC BACK PAIN: ICD-10-CM

## 2021-09-27 DIAGNOSIS — I10 ESSENTIAL (PRIMARY) HYPERTENSION: ICD-10-CM

## 2021-09-27 LAB
APPEARANCE FLUID: CLEAR
BILIRUBIN, POC: NORMAL
BLOOD URINE, POC: NORMAL
CLARITY, POC: CLEAR
COLOR, POC: YELLOW
GLUCOSE URINE, POC: NORMAL
KETONES, POC: NORMAL
LEUKOCYTE EST, POC: NORMAL
NITRITE, POC: NORMAL
PH, POC: 5.5
PROTEIN, POC: NORMAL
SPECIFIC GRAVITY, POC: 1.03
UROBILINOGEN, POC: 0.2

## 2021-09-27 PROCEDURE — 99214 OFFICE O/P EST MOD 30 MIN: CPT | Performed by: NURSE PRACTITIONER

## 2021-09-27 PROCEDURE — 96372 THER/PROPH/DIAG INJ SC/IM: CPT | Performed by: NURSE PRACTITIONER

## 2021-09-27 PROCEDURE — G8427 DOCREV CUR MEDS BY ELIG CLIN: HCPCS | Performed by: NURSE PRACTITIONER

## 2021-09-27 PROCEDURE — 81002 URINALYSIS NONAUTO W/O SCOPE: CPT | Performed by: NURSE PRACTITIONER

## 2021-09-27 PROCEDURE — G8417 CALC BMI ABV UP PARAM F/U: HCPCS | Performed by: NURSE PRACTITIONER

## 2021-09-27 PROCEDURE — 1036F TOBACCO NON-USER: CPT | Performed by: NURSE PRACTITIONER

## 2021-09-27 PROCEDURE — 74018 RADEX ABDOMEN 1 VIEW: CPT

## 2021-09-27 RX ORDER — KETOROLAC TROMETHAMINE 30 MG/ML
60 INJECTION, SOLUTION INTRAMUSCULAR; INTRAVENOUS ONCE
Status: COMPLETED | OUTPATIENT
Start: 2021-09-27 | End: 2021-09-27

## 2021-09-27 RX ORDER — TIZANIDINE 4 MG/1
4 TABLET ORAL 2 TIMES DAILY PRN
Qty: 30 TABLET | Refills: 0 | Status: SHIPPED | OUTPATIENT
Start: 2021-09-27 | End: 2022-02-15

## 2021-09-27 RX ADMIN — KETOROLAC TROMETHAMINE 60 MG: 30 INJECTION, SOLUTION INTRAMUSCULAR; INTRAVENOUS at 15:44

## 2021-09-27 ASSESSMENT — ENCOUNTER SYMPTOMS
SORE THROAT: 0
SHORTNESS OF BREATH: 0
ABDOMINAL PAIN: 0
NAUSEA: 0
CHEST TIGHTNESS: 0
COUGH: 0
DIARRHEA: 0
WHEEZING: 0

## 2021-09-27 NOTE — PROGRESS NOTES
Tobin Joseph is a 38 y.o. male who presents today for  Chief Complaint   Patient presents with    Flank Pain       HPI:  He has had R flank pain for the past couple of weeks. Pain waxes and wanes but has worsened in frequency and intensity over the past several days. Pain radiates to the right abdomen. No nausea or vomiting. No fever or chills. No urinary symptoms. No injury. No clear triggers. He has a history of thoracic and lumbar back pain but recent symptoms are different from his chronic issues. He has tried ibuprofen with minimal improvement. Massage does not help much. Heat and ice have had minimal effect. BP has been stable, controlled on current medication. Review of Systems   Constitutional: Negative for chills and fever. HENT: Negative for congestion, ear pain and sore throat. Respiratory: Negative for cough, chest tightness, shortness of breath and wheezing. Cardiovascular: Negative for chest pain. Gastrointestinal: Negative for abdominal pain, diarrhea and nausea. Genitourinary: Positive for flank pain (R). Musculoskeletal: Negative for arthralgias and myalgias. Skin: Negative for rash.        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)     Obstructive sleep apnea     AHI:  29.0 per PSG, 2012       Current Outpatient Medications   Medication Sig Dispense Refill    diclofenac (VOLTAREN) 50 MG EC tablet Take 1 tablet by mouth 3 times daily (with meals) 60 tablet 0    tiZANidine (ZANAFLEX) 4 MG tablet Take 1 tablet by mouth 2 times daily as needed (back pain) 30 tablet 0    irbesartan (AVAPRO) 300 MG tablet Take 1 tablet by mouth daily 90 tablet 3    metFORMIN (GLUCOPHAGE) 500 MG tablet Take 1 tablet by mouth 2 times daily (with meals) 60 tablet 5    albuterol sulfate  (90 Base) MCG/ACT inhaler TAKE 2 PUFFS BY MOUTH EVERY 6 HOURS AS NEEDED FOR WHEEZE 6.7 Inhaler 0    amphetamine-dextroamphetamine (ADDERALL XR) 30 MG extended release capsule Take 1 capsule by mouth 2 times daily for 30 days. 60 capsule 0     Current Facility-Administered Medications   Medication Dose Route Frequency Provider Last Rate Last Admin    ketorolac (TORADOL) injection 60 mg  60 mg IntraMUSCular Once DONALD Rivas           No Known Allergies    History reviewed. No pertinent surgical history. Social History     Tobacco Use    Smoking status: Never Smoker    Smokeless tobacco: Never Used   Vaping Use    Vaping Use: Never used   Substance Use Topics    Alcohol use: Yes     Comment: twice a year social    Drug use: No       Family History   Problem Relation Age of Onset    Diabetes Mother     Hypertension Mother     Asthma Mother     Coronary Art Dis Father     Heart Attack Father     Hypertension Father     Cancer Maternal Grandmother         kidney    Stroke Paternal Grandfather     Cancer Paternal Grandfather         lung       /80   Pulse 97   Temp 97.6 °F (36.4 °C) (Temporal)   Resp 18   Ht 5' 9\" (1.753 m)   Wt (!) 314 lb (142.4 kg)   SpO2 97%   BMI 46.37 kg/m²     Physical Exam  Vitals reviewed. Constitutional:       General: He is not in acute distress. Appearance: Normal appearance. He is well-developed. HENT:      Head: Normocephalic. Eyes:      Conjunctiva/sclera: Conjunctivae normal.      Pupils: Pupils are equal, round, and reactive to light. Neck:      Thyroid: No thyromegaly. Vascular: No carotid bruit or JVD. Trachea: No tracheal deviation. Cardiovascular:      Rate and Rhythm: Normal rate and regular rhythm. Heart sounds: Normal heart sounds. No murmur heard. Pulmonary:      Effort: Pulmonary effort is normal. No respiratory distress. Breath sounds: Normal breath sounds. Abdominal:      General: Abdomen is flat.  Bowel sounds are normal. There is no distension. Palpations: Abdomen is soft. There is no mass. Tenderness: There is abdominal tenderness (mild tenderness R mid abdomen). There is no guarding or rebound. Hernia: No hernia is present. Musculoskeletal:         General: Tenderness (R mid back/flank) present. Normal range of motion. Cervical back: Normal range of motion and neck supple. Lymphadenopathy:      Cervical: No cervical adenopathy. Skin:     General: Skin is warm and dry. Findings: No rash. Neurological:      Mental Status: He is alert. Psychiatric:         Mood and Affect: Mood normal.         Behavior: Behavior normal.         Thought Content: Thought content normal.         ASSESSMENT/PLAN:  1. Right flank pain  -UA, urine culture  -KUB, rule out renal stone  -May need CT if not improving  -Toradol 60 mg IM in office  -Diclofenac 50 mg 3 times daily as needed. Advised avoid OTC NSAIDs.  -Tizanidine 4 mg twice daily as needed  - POCT Urinalysis no Micro  - Culture, Urine; Future  - XR ABDOMEN (KUB) (SINGLE AP VIEW); Future  - ketorolac (TORADOL) injection 60 mg    2. Chronic midline thoracic back pain  -Stable, controlled. Current symptoms are not similar to his chronic back issues. 3. Essential (primary) hypertension  -Stable, controlled. Continue current medication. Return for as scheduled. Kevin Dotson was seen today for flank pain. Diagnoses and all orders for this visit:    Right flank pain  -     POCT Urinalysis no Micro  -     Culture, Urine; Future  -     XR ABDOMEN (KUB) (SINGLE AP VIEW); Future  -     ketorolac (TORADOL) injection 60 mg    Chronic midline thoracic back pain    Essential (primary) hypertension    Other orders  -     diclofenac (VOLTAREN) 50 MG EC tablet; Take 1 tablet by mouth 3 times daily (with meals)  -     tiZANidine (ZANAFLEX) 4 MG tablet;  Take 1 tablet by mouth 2 times daily as needed (back pain)      There are no discontinued medications. There are no Patient Instructions on file for this visit. Patient voicesunderstanding and agrees to plans along with risks and benefits of plan. Counseling:  Rodrick Neither case, medications and options were discussed in detail. Patient was instructed to call the office if he questionsregarding him treatment. Should him conditions worsen, he should return to office to be reassessed by DONALD Esquivel. he Should to go the closest Emergency Department for any emergency. They verbalizedunderstanding the above instructions. Return for as scheduled.

## 2021-09-27 NOTE — PROGRESS NOTES
Administrations This Visit     ketorolac (TORADOL) injection 60 mg     Admin Date  09/27/2021  15:44 Action  Given Dose  60 mg Route  IntraMUSCular Site  Ventrogluteal Right Administered By  Bharti Muñiz LPN    Ordering Provider: DONALD Rivas    NDC: 36050-317-22    Lot#: [id number]    : [facility]    Patient Supplied?: No

## 2021-09-30 DIAGNOSIS — G47.419 NARCOLEPSY WITHOUT CATAPLEXY: ICD-10-CM

## 2021-09-30 NOTE — TELEPHONE ENCOUNTER
Maynor Marco has requested a refill on his medication. Last office visit : 6/22/2021   Next office visit : 1/4/2022   Last medication refill : 8/27/21  Sonia Bryson : up to date       Requested Prescriptions     Pending Prescriptions Disp Refills    amphetamine-dextroamphetamine (ADDERALL XR) 30 MG extended release capsule 60 capsule 0     Sig: Take 1 capsule by mouth 2 times daily for 30 days.

## 2021-10-01 RX ORDER — DEXTROAMPHETAMINE SACCHARATE, AMPHETAMINE ASPARTATE MONOHYDRATE, DEXTROAMPHETAMINE SULFATE AND AMPHETAMINE SULFATE 7.5; 7.5; 7.5; 7.5 MG/1; MG/1; MG/1; MG/1
30 CAPSULE, EXTENDED RELEASE ORAL 2 TIMES DAILY
Qty: 60 CAPSULE | Refills: 0 | Status: SHIPPED | OUTPATIENT
Start: 2021-10-01 | End: 2021-10-29 | Stop reason: SDUPTHER

## 2021-10-29 DIAGNOSIS — G47.419 NARCOLEPSY WITHOUT CATAPLEXY: ICD-10-CM

## 2021-10-29 RX ORDER — DEXTROAMPHETAMINE SACCHARATE, AMPHETAMINE ASPARTATE MONOHYDRATE, DEXTROAMPHETAMINE SULFATE AND AMPHETAMINE SULFATE 7.5; 7.5; 7.5; 7.5 MG/1; MG/1; MG/1; MG/1
30 CAPSULE, EXTENDED RELEASE ORAL 2 TIMES DAILY
Qty: 60 CAPSULE | Refills: 0 | Status: SHIPPED | OUTPATIENT
Start: 2021-10-29 | End: 2021-12-06 | Stop reason: SDUPTHER

## 2021-10-29 NOTE — TELEPHONE ENCOUNTER
Requested Prescriptions     Pending Prescriptions Disp Refills    amphetamine-dextroamphetamine (ADDERALL XR) 30 MG extended release capsule 60 capsule 0     Sig: Take 1 capsule by mouth 2 times daily for 30 days.          Miguelina Mena has requested a refill on his medication.        Last office visit : 6/22/2021   Next office visit : 1/4/2022   Last medication refill : 9/27/21  Aric Tee : up to date

## 2021-11-09 ENCOUNTER — TELEPHONE (OUTPATIENT)
Dept: NEUROLOGY | Age: 41
End: 2021-11-09

## 2021-11-09 NOTE — TELEPHONE ENCOUNTER
Called spoke with patient wife about an appointment change with Dr Calvin Cheatham. Wife is aware of the changed appointment .

## 2021-11-22 DIAGNOSIS — E11.9 TYPE 2 DIABETES MELLITUS WITHOUT COMPLICATION, WITHOUT LONG-TERM CURRENT USE OF INSULIN (HCC): ICD-10-CM

## 2021-11-22 NOTE — TELEPHONE ENCOUNTER
Nawaf Caro called requesting a refill of the below medication which has been pended for you:     Requested Prescriptions     Pending Prescriptions Disp Refills    metFORMIN (GLUCOPHAGE) 500 MG tablet [Pharmacy Med Name: METFORMIN  MG TABLET] 180 tablet 1     Sig: TAKE 1 TABLET BY MOUTH TWICE A DAY WITH MEALS       Last Appointment Date: 9/16/2021  Next Appointment Date: 3/17/2022    No Known Allergies

## 2021-12-06 DIAGNOSIS — G47.419 NARCOLEPSY WITHOUT CATAPLEXY: ICD-10-CM

## 2021-12-06 RX ORDER — DEXTROAMPHETAMINE SACCHARATE, AMPHETAMINE ASPARTATE MONOHYDRATE, DEXTROAMPHETAMINE SULFATE AND AMPHETAMINE SULFATE 7.5; 7.5; 7.5; 7.5 MG/1; MG/1; MG/1; MG/1
30 CAPSULE, EXTENDED RELEASE ORAL 2 TIMES DAILY
Qty: 60 CAPSULE | Refills: 0 | Status: SHIPPED | OUTPATIENT
Start: 2021-12-06 | End: 2022-01-05 | Stop reason: SDUPTHER

## 2021-12-06 NOTE — TELEPHONE ENCOUNTER
Ed Ericka has requested a refill on his medication. Last office visit : 6/22/2021   Next office visit : 2/15/2022   Last medication refill : 10/29/21  Oc Ha : up to date     Requested Prescriptions     Pending Prescriptions Disp Refills    amphetamine-dextroamphetamine (ADDERALL XR) 30 MG extended release capsule 60 capsule 0     Sig: Take 1 capsule by mouth 2 times daily for 30 days.

## 2022-01-05 DIAGNOSIS — G47.419 NARCOLEPSY WITHOUT CATAPLEXY: ICD-10-CM

## 2022-01-05 RX ORDER — DEXTROAMPHETAMINE SACCHARATE, AMPHETAMINE ASPARTATE MONOHYDRATE, DEXTROAMPHETAMINE SULFATE AND AMPHETAMINE SULFATE 7.5; 7.5; 7.5; 7.5 MG/1; MG/1; MG/1; MG/1
30 CAPSULE, EXTENDED RELEASE ORAL 2 TIMES DAILY
Qty: 60 CAPSULE | Refills: 0 | Status: SHIPPED | OUTPATIENT
Start: 2022-01-05 | End: 2022-02-01 | Stop reason: SDUPTHER

## 2022-01-05 NOTE — TELEPHONE ENCOUNTER
Lucila Mcnamara has requested a refill on his medication. Last office visit : 6/22/2021   Next office visit : 2/15/2022   Last medication refill :[birth year]6-[birth year]21  Currynilam Zafar : 12-4-21      Requested Prescriptions     Pending Prescriptions Disp Refills    amphetamine-dextroamphetamine (ADDERALL XR) 30 MG extended release capsule 60 capsule 0     Sig: Take 1 capsule by mouth 2 times daily for 30 days.

## 2022-01-14 ENCOUNTER — VIRTUAL VISIT (OUTPATIENT)
Dept: FAMILY MEDICINE CLINIC | Age: 42
End: 2022-01-14
Payer: COMMERCIAL

## 2022-01-14 DIAGNOSIS — R11.2 NAUSEA AND VOMITING, INTRACTABILITY OF VOMITING NOT SPECIFIED, UNSPECIFIED VOMITING TYPE: ICD-10-CM

## 2022-01-14 DIAGNOSIS — U07.1 COVID-19: Primary | ICD-10-CM

## 2022-01-14 PROCEDURE — 99213 OFFICE O/P EST LOW 20 MIN: CPT | Performed by: FAMILY MEDICINE

## 2022-01-14 RX ORDER — AZITHROMYCIN 250 MG/1
250 TABLET, FILM COATED ORAL DAILY
Qty: 1 PACKET | Refills: 0 | Status: SHIPPED | OUTPATIENT
Start: 2022-01-14 | End: 2022-02-15

## 2022-01-14 RX ORDER — METHYLPREDNISOLONE 4 MG/1
TABLET ORAL
Qty: 1 KIT | Refills: 0 | Status: SHIPPED | OUTPATIENT
Start: 2022-01-14 | End: 2022-01-20

## 2022-01-14 RX ORDER — ONDANSETRON 4 MG/1
4 TABLET, ORALLY DISINTEGRATING ORAL 3 TIMES DAILY PRN
Qty: 60 TABLET | Refills: 0 | Status: SHIPPED | OUTPATIENT
Start: 2022-01-14 | End: 2022-02-15

## 2022-01-14 NOTE — PROGRESS NOTES
Sebastián Mitchell (:  1980) is a 42 y.o. male,Established patient, here for evaluation of the following chief complaint(s): Post-COVID Symptoms         ASSESSMENT/PLAN:  1. COVID-19  -     azithromycin (ZITHROMAX) 250 MG tablet; Take 1 tablet by mouth daily 2 tablets today then 1 tablet days 2-5, Disp-1 packet, R-0Normal  -     methylPREDNISolone (MEDROL DOSEPACK) 4 MG tablet; Take by mouth., Disp-1 kit, R-0Normal  2. Nausea and vomiting, intractability of vomiting not specified, unspecified vomiting type  -     ondansetron (ZOFRAN-ODT) 4 MG disintegrating tablet; Take 1 tablet by mouth 3 times daily as needed for Nausea or Vomiting, Disp-60 tablet, R-0Normal    Prescriptions as above. Work note sent via [address], and also will email to him as requested. Return for already scheduled follow-up. SUBJECTIVE/OBJECTIVE:  HPI  Patient of Ced Martínez MD presents with COVID symptoms. His symptoms started 10 days ago at 3:00 in the morning; presenting symptoms were headache, cough, low-grade temperature less than 100, fatigue, nausea, diarrhea, and loss of taste. He did a home test that was positive, his work required a \"real\" COVID test so he went to [place] and PCR test was positive as well he states. He has been isolating since then, works from home today for the first time. Most of his symptoms are improving but he still has chills and the GI symptoms, starting to get his taste back. His wife sees Dr. Rosanne Carlos, she was also positive and she was prescribed a Z-Shahid and Medrol Dosepak. Patient states he did take some of her medication, which does seem to be helping. He would like prescriptions for the same, as well as a work note stating he can return next week. He also would like some additional Zofran if possible. Review of Systems   Constitutional: Positive for chills. Negative for fever. HENT: Negative for facial swelling and mouth sores. Eyes: Negative for discharge and itching. Respiratory: Negative for apnea and stridor. Cardiovascular: Negative for chest pain and palpitations. Gastrointestinal: Positive for diarrhea and nausea. Negative for vomiting. Endocrine: Negative for cold intolerance and heat intolerance. Genitourinary: Negative for frequency and urgency. Musculoskeletal: Negative for arthralgias and back pain. Skin: Negative for color change and rash. No flowsheet data found.      Physical Exam    [INSTRUCTIONS:  \"[x]\" Indicates a positive item  \"[]\" Indicates a negative item  -- DELETE ALL ITEMS NOT EXAMINED]    Constitutional: [x] Appears well-developed and well-nourished [x] No apparent distress      [] Abnormal -     Mental status: [x] Alert and awake  [x] Oriented to person/place/time [x] Able to follow commands    [] Abnormal -     Eyes:   EOM    [x]  Normal    [] Abnormal -   Sclera  [x]  Normal    [] Abnormal -          Discharge [x]  None visible   [] Abnormal -     HENT: [x] Normocephalic, atraumatic  [] Abnormal -   [x] Mouth/Throat: Mucous membranes are moist    External Ears [x] Normal  [] Abnormal -    Neck: [x] No visualized mass [] Abnormal -     Pulmonary/Chest: [x] Respiratory effort normal   [x] No visualized signs of difficulty breathing or respiratory distress        [] Abnormal -      Musculoskeletal:   [x] Normal gait with no signs of ataxia         [x] Normal range of motion of neck        [] Abnormal -     Neurological:        [x] No Facial Asymmetry (Cranial nerve 7 motor function) (limited exam due to video visit)          [x] No gaze palsy        [] Abnormal -          Skin:        [x] No significant exanthematous lesions or discoloration noted on facial skin         [] Abnormal -            Psychiatric:       [x] Normal Affect [] Abnormal -        [x] No Hallucinations    Other pertinent observable physical exam findings:-          On this date 1/14/2022 I have spent 25 minutes reviewing previous notes, test results and face to face (virtual) with the patient discussing the diagnosis and importance of compliance with the treatment plan as well as documenting on the day of the visit. Rachel Bryce, was evaluated through a synchronous (real-time) audio-video encounter. The patient (or guardian if applicable) is aware that this is a billable service. Verbal consent to proceed has been obtained within the past 12 months. The visit was conducted pursuant to the emergency declaration under the [address] [place] [address] and the [employer] General Act. Patient identification was verified, and a caregiver was present when appropriate. The patient was located in a state where the provider was credentialed to provide care.       An electronic signature was used to authenticate this note.    --Ronnie Russell MD

## 2022-01-14 NOTE — LETTER
[address] [place] [address]  Phone: [phone]  Fax: [phone]    Rosa Torrez MD        January 14, 2022     Patient: Almaz Adames   YOB: 1980   Date of Visit: 1/14/2022       To Whom It May Concern: It is my medical opinion that Cary Santos may return to work on 1/17/2021. Please excuse him for 1/4 - 1/14/2022. If you have any questions or concerns, please don't hesitate to call.     Sincerely,        Rosa Torrez MD

## 2022-01-15 ASSESSMENT — ENCOUNTER SYMPTOMS
BACK PAIN: 0
EYE DISCHARGE: 0
VOMITING: 0
APNEA: 0
EYE ITCHING: 0
STRIDOR: 0
DIARRHEA: 1
COLOR CHANGE: 0
NAUSEA: 1
FACIAL SWELLING: 0

## 2022-01-27 ENCOUNTER — TELEPHONE (OUTPATIENT)
Dept: FAMILY MEDICINE CLINIC | Age: 42
End: 2022-01-27

## 2022-01-27 NOTE — TELEPHONE ENCOUNTER
I did not see him for his illness will need a couple of questions answered before I completed. What was the approximate date the condition started? Has a symptoms completely resolved  If his symptoms have not resolved I need to see him in the office before clearing him for work  Has he been back to work?

## 2022-01-27 NOTE — TELEPHONE ENCOUNTER
Patient has called asking about his [facility] paperwork. It is on your desk to be signed it is due 2/1/22. Advised him it is not ready yet.

## 2022-01-28 NOTE — TELEPHONE ENCOUNTER
He saw Dr. Rachelle Mac for this and I will forward to him as well to see if he can fill out paperwork (scanned into media)

## 2022-01-31 ENCOUNTER — OFFICE VISIT (OUTPATIENT)
Dept: FAMILY MEDICINE CLINIC | Age: 42
End: 2022-01-31
Payer: COMMERCIAL

## 2022-01-31 VITALS
BODY MASS INDEX: 44.45 KG/M2 | TEMPERATURE: 97.2 F | SYSTOLIC BLOOD PRESSURE: 122 MMHG | OXYGEN SATURATION: 97 % | DIASTOLIC BLOOD PRESSURE: 72 MMHG | WEIGHT: 301 LBS | HEART RATE: 95 BPM

## 2022-01-31 DIAGNOSIS — U07.1 COVID-19 VIRUS INFECTION: Primary | ICD-10-CM

## 2022-01-31 DIAGNOSIS — I10 ESSENTIAL (PRIMARY) HYPERTENSION: ICD-10-CM

## 2022-01-31 PROCEDURE — G8484 FLU IMMUNIZE NO ADMIN: HCPCS | Performed by: FAMILY MEDICINE

## 2022-01-31 PROCEDURE — G8427 DOCREV CUR MEDS BY ELIG CLIN: HCPCS | Performed by: FAMILY MEDICINE

## 2022-01-31 PROCEDURE — G8417 CALC BMI ABV UP PARAM F/U: HCPCS | Performed by: FAMILY MEDICINE

## 2022-01-31 PROCEDURE — 1036F TOBACCO NON-USER: CPT | Performed by: FAMILY MEDICINE

## 2022-01-31 PROCEDURE — 99213 OFFICE O/P EST LOW 20 MIN: CPT | Performed by: FAMILY MEDICINE

## 2022-01-31 RX ORDER — DOXYCYCLINE 100 MG/1
100 CAPSULE ORAL 2 TIMES DAILY
Qty: 20 CAPSULE | Refills: 0 | Status: SHIPPED | OUTPATIENT
Start: 2022-01-31 | End: 2022-03-17 | Stop reason: SDUPTHER

## 2022-01-31 NOTE — PROGRESS NOTES
[facility] PHYSICIAN SERVICES  [facility]  [address] [facility] [address] [address]  Dept: [phone]  Dept Fax: [phone]  Loc: [phone]    Sebastián Mitchell is a 42 y.o. male who presents today for his medical conditions/complaints as noted below. Sebastián Mitchell is here for Forms        HPI:   CC: Here today to discuss the following:    Symptoms first began on January 4, 2022  Home COVID test was positive  Symptoms included: Headache, cough, low-grade fever, fatigue, nausea, diarrhea and loss of taste. Symptoms have resolved  Return to work on January 14, 2022. Hypertension  Compliant with medications. No adverse effects from medication. No lightheadedness, palpitations, or chest pain. 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     Family History   Problem Relation Age of Onset    Diabetes Mother     Hypertension Mother     Asthma Mother     Coronary Art Dis Father     Heart Attack Father     Hypertension Father     Cancer Maternal Grandmother         kidney    Stroke Paternal Grandfather     Cancer Paternal Grandfather         lung       Social History     Tobacco Use    Smoking status: Never Smoker    Smokeless tobacco: Never Used   Substance Use Topics    Alcohol use: Yes     Comment: twice a year social     Current Outpatient Medications   Medication Sig Dispense Refill    doxycycline monohydrate (MONODOX) 100 MG capsule Take 1 capsule by mouth 2 times daily 20 capsule 0    ondansetron (ZOFRAN-ODT) 4 MG disintegrating tablet Take 1 tablet by mouth 3 times daily as needed for Nausea or Vomiting 60 tablet 0    metFORMIN (GLUCOPHAGE) 500 MG tablet TAKE 1 TABLET BY MOUTH TWICE A DAY WITH MEALS 180 tablet 1    diclofenac (VOLTAREN) 50 MG EC tablet Take 1 tablet by mouth 3 times daily (with meals) 60 tablet 0    tiZANidine (ZANAFLEX) 4 MG tablet Take 1 tablet by mouth 2 times daily as needed (back pain) 30 tablet 0    irbesartan (AVAPRO) 300 MG tablet Take 1 tablet by mouth daily 90 tablet 3    albuterol sulfate  (90 Base) MCG/ACT inhaler TAKE 2 PUFFS BY MOUTH EVERY 6 HOURS AS NEEDED FOR WHEEZE 6.7 Inhaler 0    [START ON 2/4/2022] amphetamine-dextroamphetamine (ADDERALL XR) 30 MG extended release capsule Take 1 capsule by mouth 2 times daily for 30 days. 60 capsule 0    azithromycin (ZITHROMAX) 250 MG tablet Take 1 tablet by mouth daily 2 tablets today then 1 tablet days 2-5 1 packet 0     No current facility-administered medications for this visit. No Known Allergies    Health Maintenance   Topic Date Due    Hepatitis C screen  Never done    COVID-19 Vaccine (1) Never done    Pneumococcal 0-64 years Vaccine (1 of 2 - PPSV23) Never done    Diabetic foot exam  Never done    HIV screen  Never done    Diabetic retinal exam  Never done    Hepatitis B vaccine (1 of 3 - Risk 3-dose series) Never done    DTaP/Tdap/Td vaccine (1 - Tdap) 05/02/2012    Flu vaccine (1) Never done    Diabetic microalbuminuria test  05/18/2022    Depression Screen  09/16/2022    A1C test (Diabetic or Prediabetic)  09/20/2022    Lipid screen  09/20/2022    Potassium monitoring  09/20/2022    Creatinine monitoring  09/20/2022    Hepatitis A vaccine  Aged Out    Hib vaccine  Aged Out    Meningococcal (ACWY) vaccine  Aged Out       Subjective:      Review of Systems    Review of Systems   Constitutional: Negative for chills and fever. HENT: Negative for congestion. Respiratory: Negative for cough, chest tightness and shortness of breath. Cardiovascular: Negative for chest pain, palpitations and leg swelling.    Gastrointestinal: Negative for abdominal pain, anal bleeding, constipation, diarrhea and nausea. Genitourinary: Negative for difficulty urinating. Psychiatric/Behavioral: Negative. SeeHPI for visit specific review of symptoms. All others negative      Objective:   /72   Pulse 95   Temp 97.2 °F (36.2 °C)   Wt (!) 301 lb (136.5 kg)   SpO2 97%   BMI 44.45 kg/m²   Physical Exam  Physical Exam   Constitutional: He appears well-developed. Does not appear ill. Neck: Normal range of motion. Neck supple. No masses. Neck Symmetric. Normal tracheal position. No thyroid enlargement  Cardiovascular: Normal rate and regular rhythm. Exam reveals no friction rub. Carotid arteries: no bruit observed. No murmur heard. Respiratory:  Effort normal and breath sounds normal. No respiratory distress. No wheezes. No rales. No use of accessory muscles or intercostal retractions. Neurological: alert. Psychiatric: normal mood and affect. His behavior is normal. Normal judgement and insight observed. No results found for this or any previous visit (from the past 672 hour(s)). Assessment & Plan: The following diagnoses and conditions are stable with no further orders unless indicated:  1. COVID-19 virus infection  He is asymptomatic. He quarantined during his Covid infection  Peers to have no symptoms consistent with post acute COVID syndrome      2. Essential (primary) hypertension  BP Readings from Last 3 Encounters:   01/31/22 122/72   09/27/21 128/80   09/16/21 (!) 138/90         Stable    Return to clinic if symptoms worsen        No follow-ups on file. Discussed use, benefit, and side effects of prescribed medications. All patient questions answered. Pt voiced understanding. Reviewed health maintenance. Instructedto continue current medications, diet and exercise. Patient agreed with treatmentplan. Follow up as directed. Note dictated using Dragon Dictation software  Sometimes this dictation software makes erroneous transcriptions.

## 2022-02-01 DIAGNOSIS — G47.419 NARCOLEPSY WITHOUT CATAPLEXY: ICD-10-CM

## 2022-02-01 NOTE — TELEPHONE ENCOUNTER
Candance Ludwig has requested a refill on his medication. Last office visit : 6/22/2021   Next office visit : 2/15/2022   Last medication refill :1/5/2022  Sarah Kim : 12/4/2021      Requested Prescriptions     Pending Prescriptions Disp Refills    amphetamine-dextroamphetamine (ADDERALL XR) 30 MG extended release capsule 60 capsule 0     Sig: Take 1 capsule by mouth 2 times daily for 30 days.        *Script updated to reflect fill date of 2/4/2022

## 2022-02-02 RX ORDER — DEXTROAMPHETAMINE SACCHARATE, AMPHETAMINE ASPARTATE MONOHYDRATE, DEXTROAMPHETAMINE SULFATE AND AMPHETAMINE SULFATE 7.5; 7.5; 7.5; 7.5 MG/1; MG/1; MG/1; MG/1
30 CAPSULE, EXTENDED RELEASE ORAL 2 TIMES DAILY
Qty: 60 CAPSULE | Refills: 0 | Status: SHIPPED | OUTPATIENT
Start: 2022-02-04 | End: 2022-03-07 | Stop reason: SDUPTHER

## 2022-02-15 ENCOUNTER — OFFICE VISIT (OUTPATIENT)
Dept: NEUROLOGY | Age: 42
End: 2022-02-15
Payer: COMMERCIAL

## 2022-02-15 VITALS
HEART RATE: 109 BPM | WEIGHT: 294 LBS | SYSTOLIC BLOOD PRESSURE: 136 MMHG | OXYGEN SATURATION: 95 % | BODY MASS INDEX: 43.55 KG/M2 | DIASTOLIC BLOOD PRESSURE: 85 MMHG | HEIGHT: 69 IN

## 2022-02-15 DIAGNOSIS — G47.33 OBSTRUCTIVE SLEEP APNEA: ICD-10-CM

## 2022-02-15 DIAGNOSIS — G47.419 NARCOLEPSY WITHOUT CATAPLEXY: Primary | ICD-10-CM

## 2022-02-15 PROCEDURE — G8484 FLU IMMUNIZE NO ADMIN: HCPCS | Performed by: PSYCHIATRY & NEUROLOGY

## 2022-02-15 PROCEDURE — 99214 OFFICE O/P EST MOD 30 MIN: CPT | Performed by: PSYCHIATRY & NEUROLOGY

## 2022-02-15 PROCEDURE — 1036F TOBACCO NON-USER: CPT | Performed by: PSYCHIATRY & NEUROLOGY

## 2022-02-15 PROCEDURE — G8427 DOCREV CUR MEDS BY ELIG CLIN: HCPCS | Performed by: PSYCHIATRY & NEUROLOGY

## 2022-02-15 PROCEDURE — G8417 CALC BMI ABV UP PARAM F/U: HCPCS | Performed by: PSYCHIATRY & NEUROLOGY

## 2022-02-15 NOTE — LETTER
[place] [address]  [place], [address]  Phone [phone] Fax [phone]     Patient Name: Chris Ricardo 2/15/2022  : 1980  Age: 42 y.o.   Patient Address: [place] [address] [address]       Patient Phone: [phone] (home) [phone] (work)    REFERRAL  Referred to: DME provider of patient's choice  Chris Ricardo is referred for the following:    DME Equipment HPCPS Code Setting   Auto Adjusting CPAP device with flex or comparable pressure relief per comfort  10cm to 20cm   Heated Humidifier  Patient Choice       Replinishible PAP Supplies, 1 year supply  Item HPCPS Code Frequency   Mask of choice  or  1 per 3 months   Nasal Mask cushion/pillows  or  2 per 30 days   Full Face Mask Interface  1 per 30 days   Headgear  1 per 6 months   Tubing, length of choice  or  1 per 3 months   Water Chamber  1 per 6 months   Chinstrap  1 per 6 months   Disposable Filters  2 per 30 days   Reusable Filters  1 per 6 months     Diagnoses:  Obstructive sleep apnea (G47.33)  Length of Need: Lifetime, 99    Ordering Provider: HAYLEY Higuera [facility]: [id number]         Signature:       Date: 2/15/2022      Electronically Signed by Parvez Mancia M.D.

## 2022-02-15 NOTE — PROGRESS NOTES
[facility]  [address] [place] [address] [address]  [place], [place]  Phone [phone]  Fax [phone]     [address], [address] Follow Up Encounter  2/15/22 4:01 PM CST    Information:   Patient Name: Lucila Mcnamara  :   1980  Age:   42 y.o. MRN:   [id number]  Account #:  [de-identified]  Today:  2/15/22    Provider: Nancy Hardin M.D. Chief Complaint:   Chief Complaint   Patient presents with    6 Month Follow-Up     SARY- pt has not been wearing DME machine        Subjective:   Lucila Mcnamara is a 42 y.o. man with a history of narcolepsy and obstructive sleep apnea who is following up. He is an  and since [employer], he has been extremely busy. He has not been sleeping regularly and has not been suing his CPAP. His CPAP is very old Respironics System One machine. He takes Adderall for his narcolepsy. It works fairly well. He has had no side effects. He has tried Ritalin, Dexadrine, Xyrem, and Provigil in the past.        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 No pertinent surgical history. Recent Hospitalizations  · None    Significant Injuries  · None    Habits  Lucila Mcnamara reports that he has never smoked. He has never used smokeless tobacco. He reports current alcohol use. He reports that he does not use drugs.     Family History   Problem Relation Age of Onset    Diabetes Mother     Hypertension Mother     Asthma Mother     Coronary Art Dis Father     Heart Attack Father     Hypertension Father     Cancer Maternal Grandmother         kidney    Stroke Paternal Grandfather     Cancer Paternal Grandfather         lung       Social History  Dena Cisse Edmundo is , live in [place], [place], and works as an  at [employer].    Medications:  Current Outpatient Medications   Medication Sig Dispense Refill    metFORMIN (GLUCOPHAGE) 500 MG tablet TAKE 1 TABLET BY MOUTH TWICE A DAY WITH MEALS (Patient taking differently: Take 500 mg by mouth daily (with breakfast) ) 180 tablet 1    irbesartan (AVAPRO) 300 MG tablet Take 1 tablet by mouth daily 90 tablet 3    albuterol sulfate  (90 Base) MCG/ACT inhaler TAKE 2 PUFFS BY MOUTH EVERY 6 HOURS AS NEEDED FOR WHEEZE 6.7 Inhaler 0    Misc. Devices MISC Glucometer  ICD 10: E11.9 1 each 0    Lancet Device MISC 1 lancet per strip. 30 each 5    blood glucose monitor strips 1 strip by Other route daily 30 strip 11    cefdinir (OMNICEF) 300 MG capsule Take 1 capsule by mouth 2 times daily for 10 days 20 capsule 0    doxycycline monohydrate (MONODOX) 100 MG capsule Take 1 capsule by mouth daily 30 capsule 4    amphetamine-dextroamphetamine (ADDERALL XR) 30 MG extended release capsule Take 1 capsule by mouth 2 times daily for 30 days. 60 capsule 0     No current facility-administered medications for this visit. Allergies:   Allergies as of 02/15/2022    (No Known Allergies)       Review of Systems:  Constitutional: negative for - chills and fever  Eyes:  negative for - visual disturbance and photophobia  HENMT: negative for - headaches and sinus pain  Respiratory: negative for - cough, hemoptysis, and shortness of breath  Cardiovascular: negative for - chest pain and palpitations  Gastrointestinal: negative for - blood in stools, constipation, diarrhea, nausea, and vomiting  Genito-Urinary: negative for - hematuria, urinary frequency, urinary urgency, and urinary retention  Musculoskeletal: negative for - joint pain, joint stiffness, and joint swelling  Hematological and Lymphatic: negative for - bleeding problems, abnormal bruising, and swollen lymph nodes  Endocrine:  negative for - polydipsia and polyphagia  Allergy/Immunology:  negative for - rhinorrhea, sinus congestion, hives  Integument:  negative for - negative for - rash, change in moles, new or changing lesions  Psychological: negative for - anxiety and depression  Neurological: negative for - memory loss, numbness/tingling, and weakness     PHYSICAL EXAMINATION:  Vitals:  /85   Pulse 109   Ht 5' 9\" (1.753 m)   Wt 294 lb (133.4 kg)   SpO2 95%   BMI 43.42 kg/m²   General appearance:  Alert, well developed, well nourished, in no distress  HEENT:  normocephalic, atraumatic, sclera appear normal, no nasal abnormalities, no rhinorrhea, Ears appear normal, oral mucous membranes are moist without erythema, trachea midline, thyroid is normal, no lymphadenopathy or neck mass. III (soft palate, base of uvula visible). Cardiovascular:  Regular rate and rhythm without murmer. No peripheral edema, No cyanosis or clubbing. No carotid bruits. Pulmonary:  Lungs are clear to auscultation. Breathing appears normal, good expansion, normal effort without use of accessory muscles  Musculoskeletal:  Joints are normal.  No splints, slings, or casts. Spine appears normal with normal posture and range of motion. Integument:  No rash, erythema, or pallor  Psychiatric:  Mood, affect, and behavior appear normal      NEUROLOGIC EXAMINATION:  Mental Status:  alert, oriented to person, place, and time. Speech:  Clear without dysarthria or dysphonia  Language:  Fluent without aphasia  Cranial Nerves:   II Visual fields are full to confrontation   III,IV, VI Extraocular movements are full   VII Facial movements are symmetrical without weakness   VIII Hearing is intact   XII No tongue atrophy or fasciculations. Normal tongue protrusion. No tongue weakness  Motor:  Normal strength in both upper and lower extremities. Normal muscle tone and bulk. Deep tendon reflexes are intact and symmetrical in both upper and lower extremities.   Rodríguez's signs are absent bilaterally. There is no ankle clonus on either side. Sensation:  Sensation is intact to light touch and vibration in all extremities. Coordination:  Rapid alternating movements are normal in both upper and lower extremities. Finger to nose testing is unimpaired bilaterally. Gait:  Normal station and gait. Pertinent Diagnostic Studies:  Jeannette Salas was appropriate    Assessment:       ICD-10-CM    1. Narcolepsy without cataplexy  G47.419    2. Obstructive sleep apnea  G47.33      I discussed the diagnosis of obstructive sleep apnea with Riddhi Sarabia including the pathophysiology (namely the mechanism of breathing and obstruction of upper airway, interruptions of sleep, hypoxemia, hypercapnia, and results of repetitive sympathetic activation), risks, evaluation, and treatment options. I discussed the risks of driving when drowsy and advised that Riddhi Sarabia not drive when drowsy and avoid sedating medications and respiratory suppressants. Plan:   1. Continue Adderall  2. He needs to get enough sleep at night  3. I advised he get a new machine and to do that he will need a sleep study. He declines for now and says he will continue using his old machine.   4. FU in 6 months    Electronically signed by Jena Gallegos MD on 2/15/22

## 2022-02-25 DIAGNOSIS — E78.01 FAMILIAL HYPERCHOLESTEROLEMIA: ICD-10-CM

## 2022-02-25 DIAGNOSIS — R53.83 OTHER FATIGUE: ICD-10-CM

## 2022-02-25 DIAGNOSIS — E11.9 TYPE 2 DIABETES MELLITUS WITHOUT COMPLICATION, WITHOUT LONG-TERM CURRENT USE OF INSULIN (HCC): ICD-10-CM

## 2022-02-25 LAB
ALBUMIN SERPL-MCNC: 4.4 G/DL (ref 3.5–5.2)
ALP BLD-CCNC: 61 U/L (ref 40–130)
ALT SERPL-CCNC: 37 U/L (ref 5–41)
ANION GAP SERPL CALCULATED.3IONS-SCNC: 14 MMOL/L (ref 7–19)
AST SERPL-CCNC: 23 U/L (ref 5–40)
BASOPHILS ABSOLUTE: 0.1 K/UL (ref 0–0.2)
BASOPHILS RELATIVE PERCENT: 0.7 % (ref 0–1)
BILIRUB SERPL-MCNC: 0.5 MG/DL (ref 0.2–1.2)
BUN BLDV-MCNC: 16 MG/DL (ref 6–20)
CALCIUM SERPL-MCNC: 9.2 MG/DL (ref 8.6–10)
CHLORIDE BLD-SCNC: 104 MMOL/L (ref 98–111)
CHOLESTEROL, TOTAL: 179 MG/DL (ref 160–199)
CO2: 23 MMOL/L (ref 22–29)
CREAT SERPL-MCNC: 0.8 MG/DL (ref 0.5–1.2)
CREATININE URINE: 166.7 MG/DL (ref 4.2–622)
EOSINOPHILS ABSOLUTE: 0.1 K/UL (ref 0–0.6)
EOSINOPHILS RELATIVE PERCENT: 1.1 % (ref 0–5)
GFR AFRICAN AMERICAN: >59
GFR NON-AFRICAN AMERICAN: >60
GLUCOSE BLD-MCNC: 103 MG/DL (ref 74–109)
HBA1C MFR BLD: 5.8 % (ref 4–6)
HCT VFR BLD CALC: 51.3 % (ref 42–52)
HDLC SERPL-MCNC: 42 MG/DL (ref 55–121)
HEMOGLOBIN: 16.3 G/DL (ref 14–18)
IMMATURE GRANULOCYTES #: 0.1 K/UL
LDL CHOLESTEROL CALCULATED: 114 MG/DL
LYMPHOCYTES ABSOLUTE: 3.4 K/UL (ref 1.1–4.5)
LYMPHOCYTES RELATIVE PERCENT: 33.1 % (ref 20–40)
MCH RBC QN AUTO: 29.8 PG (ref 27–31)
MCHC RBC AUTO-ENTMCNC: 31.8 G/DL (ref 33–37)
MCV RBC AUTO: 93.8 FL (ref 80–94)
MICROALBUMIN UR-MCNC: <1.2 MG/DL (ref 0–19)
MICROALBUMIN/CREAT UR-RTO: NORMAL MG/G
MONOCYTES ABSOLUTE: 1 K/UL (ref 0–0.9)
MONOCYTES RELATIVE PERCENT: 9.3 % (ref 0–10)
NEUTROPHILS ABSOLUTE: 5.7 K/UL (ref 1.5–7.5)
NEUTROPHILS RELATIVE PERCENT: 55.2 % (ref 50–65)
PDW BLD-RTO: 13.8 % (ref 11.5–14.5)
PLATELET # BLD: 294 K/UL (ref 130–400)
PMV BLD AUTO: 10.9 FL (ref 9.4–12.4)
POTASSIUM SERPL-SCNC: 4.3 MMOL/L (ref 3.5–5)
RBC # BLD: 5.47 M/UL (ref 4.7–6.1)
SODIUM BLD-SCNC: 141 MMOL/L (ref 136–145)
T4 FREE: 1.2 NG/DL (ref 0.93–1.7)
TOTAL PROTEIN: 6.9 G/DL (ref 6.6–8.7)
TRIGL SERPL-MCNC: 117 MG/DL (ref 0–149)
TSH SERPL DL<=0.05 MIU/L-ACNC: 1.34 UIU/ML (ref 0.27–4.2)
WBC # BLD: 10.4 K/UL (ref 4.8–10.8)

## 2022-03-02 RX ORDER — IRBESARTAN 150 MG/1
TABLET ORAL
Qty: 90 TABLET | Refills: 1 | OUTPATIENT
Start: 2022-03-02

## 2022-03-07 DIAGNOSIS — G47.419 NARCOLEPSY WITHOUT CATAPLEXY: ICD-10-CM

## 2022-03-07 RX ORDER — DEXTROAMPHETAMINE SACCHARATE, AMPHETAMINE ASPARTATE MONOHYDRATE, DEXTROAMPHETAMINE SULFATE AND AMPHETAMINE SULFATE 7.5; 7.5; 7.5; 7.5 MG/1; MG/1; MG/1; MG/1
30 CAPSULE, EXTENDED RELEASE ORAL 2 TIMES DAILY
Qty: 60 CAPSULE | Refills: 0 | Status: SHIPPED | OUTPATIENT
Start: 2022-03-07 | End: 2022-04-11 | Stop reason: SDUPTHER

## 2022-03-07 NOTE — TELEPHONE ENCOUNTER
Ewa Benitez has requested a refill on his medication. Last office visit : 2/15/2022   Next office visit : 8/8/2022   Last medication refill : 2/4/22  Tish Steele : up to date       Requested Prescriptions     Pending Prescriptions Disp Refills    amphetamine-dextroamphetamine (ADDERALL XR) 30 MG extended release capsule 60 capsule 0     Sig: Take 1 capsule by mouth 2 times daily for 30 days.

## 2022-03-17 ENCOUNTER — OFFICE VISIT (OUTPATIENT)
Dept: FAMILY MEDICINE CLINIC | Age: 42
End: 2022-03-17
Payer: COMMERCIAL

## 2022-03-17 ENCOUNTER — TELEPHONE (OUTPATIENT)
Dept: NEUROLOGY | Age: 42
End: 2022-03-17

## 2022-03-17 VITALS
OXYGEN SATURATION: 98 % | TEMPERATURE: 97.5 F | SYSTOLIC BLOOD PRESSURE: 128 MMHG | WEIGHT: 298 LBS | HEART RATE: 105 BPM | BODY MASS INDEX: 44.01 KG/M2 | DIASTOLIC BLOOD PRESSURE: 80 MMHG

## 2022-03-17 DIAGNOSIS — G47.419 NARCOLEPSY WITHOUT CATAPLEXY: ICD-10-CM

## 2022-03-17 DIAGNOSIS — L73.2 HYDRADENITIS: ICD-10-CM

## 2022-03-17 DIAGNOSIS — E11.9 TYPE 2 DIABETES MELLITUS WITHOUT COMPLICATION, WITHOUT LONG-TERM CURRENT USE OF INSULIN (HCC): Primary | ICD-10-CM

## 2022-03-17 DIAGNOSIS — E78.01 FAMILIAL HYPERCHOLESTEROLEMIA: ICD-10-CM

## 2022-03-17 DIAGNOSIS — I10 ESSENTIAL (PRIMARY) HYPERTENSION: ICD-10-CM

## 2022-03-17 DIAGNOSIS — G47.33 OSA ON CPAP: ICD-10-CM

## 2022-03-17 DIAGNOSIS — H66.001 NON-RECURRENT ACUTE SUPPURATIVE OTITIS MEDIA OF RIGHT EAR WITHOUT SPONTANEOUS RUPTURE OF TYMPANIC MEMBRANE: ICD-10-CM

## 2022-03-17 DIAGNOSIS — Z99.89 OSA ON CPAP: ICD-10-CM

## 2022-03-17 PROCEDURE — G8427 DOCREV CUR MEDS BY ELIG CLIN: HCPCS | Performed by: FAMILY MEDICINE

## 2022-03-17 PROCEDURE — 3044F HG A1C LEVEL LT 7.0%: CPT | Performed by: FAMILY MEDICINE

## 2022-03-17 PROCEDURE — 99214 OFFICE O/P EST MOD 30 MIN: CPT | Performed by: FAMILY MEDICINE

## 2022-03-17 PROCEDURE — 1036F TOBACCO NON-USER: CPT | Performed by: FAMILY MEDICINE

## 2022-03-17 PROCEDURE — G8484 FLU IMMUNIZE NO ADMIN: HCPCS | Performed by: FAMILY MEDICINE

## 2022-03-17 PROCEDURE — G8417 CALC BMI ABV UP PARAM F/U: HCPCS | Performed by: FAMILY MEDICINE

## 2022-03-17 PROCEDURE — 2022F DILAT RTA XM EVC RTNOPTHY: CPT | Performed by: FAMILY MEDICINE

## 2022-03-17 RX ORDER — GLUCOSAMINE HCL/CHONDROITIN SU 500-400 MG
1 CAPSULE ORAL DAILY
Qty: 30 STRIP | Refills: 11
Start: 2022-03-17 | End: 2022-06-28 | Stop reason: ALTCHOICE

## 2022-03-17 RX ORDER — DOXYCYCLINE 100 MG/1
100 CAPSULE ORAL DAILY
Qty: 30 CAPSULE | Refills: 2 | Status: SHIPPED | OUTPATIENT
Start: 2022-03-17 | End: 2022-03-17 | Stop reason: SDUPTHER

## 2022-03-17 RX ORDER — SYRING-NEEDL,DISP,INSUL,0.3 ML 30 GX5/16"
SYRINGE, EMPTY DISPOSABLE MISCELLANEOUS
Qty: 30 EACH | Refills: 5
Start: 2022-03-17 | End: 2022-06-28 | Stop reason: ALTCHOICE

## 2022-03-17 RX ORDER — DOXYCYCLINE 100 MG/1
100 CAPSULE ORAL DAILY
Qty: 30 CAPSULE | Refills: 4 | Status: SHIPPED | OUTPATIENT
Start: 2022-03-17

## 2022-03-17 RX ORDER — CEFDINIR 300 MG/1
300 CAPSULE ORAL 2 TIMES DAILY
Qty: 20 CAPSULE | Refills: 0 | Status: SHIPPED | OUTPATIENT
Start: 2022-03-17 | End: 2022-03-27

## 2022-03-17 ASSESSMENT — ENCOUNTER SYMPTOMS
RHINORRHEA: 1
DIARRHEA: 0
COUGH: 0
SHORTNESS OF BREATH: 0
NAUSEA: 0
FACIAL SWELLING: 0
CONSTIPATION: 0
ANAL BLEEDING: 0
SINUS PAIN: 0
CHEST TIGHTNESS: 0
ABDOMINAL PAIN: 0

## 2022-03-17 NOTE — PROGRESS NOTES
[facility] PHYSICIAN SERVICES  [facility] FAMILY MEDICINE  [address] [address] [place] [address]  Dept: [phone]  Dept Fax: [phone]  Loc: [phone]    Rafat Leal is a 42 y.o. male who presents today for his medical conditions/complaints as noted below. Rafat Leal is here for 6 Month Follow-Up, Otalgia, and Pharyngitis        HPI:   CC: Here today to discuss the following:    Here for routine follow-up. Is complaining of right ear pain and sore throat. Symptoms been going on for the past 2 days. No fever chills. No coughing or wheezing. No dizziness, tinnitus, or hearing loss. Diabetes Mellitus  Has been compliant with medications. No side effects of medications since last visit. No polyuria, polydipsia, or vision changes since last visit. No symptomatic episodes of hypoglycemia. Has been sticking to a an Atkins diet with good success. He has lost about 50 pounds since last June. SARY on CPAP  Compliant with CPAP. No daytime somnolence. Feels rested for the most part when wearing CPAP. Continues to be followed by neurology for history of narcolepsy. Currently on Adderall XR. Hypertension  Compliant with medications. No adverse effects from medication. No lightheadedness, palpitations, or chest pain. HPI    Subjective:      Review of Systems   Constitutional: Negative for chills and fever. HENT: Positive for ear pain, postnasal drip and rhinorrhea. Negative for congestion, ear discharge, facial swelling and sinus pain. Respiratory: Negative for cough, chest tightness and shortness of breath. Cardiovascular: Negative for chest pain, palpitations and leg swelling. Gastrointestinal: Negative for abdominal pain, anal bleeding, constipation, diarrhea and nausea. Genitourinary: Negative for difficulty urinating. Psychiatric/Behavioral: Negative. SeeHPI for visit specific review of symptoms.   All others negative      Objective:   /80   Pulse 105   Temp 97.5 °F (36.4 °C)   Wt 298 lb (135.2 kg)   SpO2 98%   BMI 44.01 kg/m²   Physical Exam  Constitutional:       Appearance: He is well-developed. He is not ill-appearing. HENT:      Ears:      Comments: Right TM with serous fluid. Minimal erythema. Left TM clear. OP clear  Eyes:      Pupils: Pupils are equal, round, and reactive to light. Cardiovascular:      Rate and Rhythm: Normal rate and regular rhythm. Heart sounds: No murmur heard. No friction rub. Pulmonary:      Effort: Pulmonary effort is normal. No respiratory distress. Breath sounds: Normal breath sounds. No wheezing or rales. Abdominal:      General: Bowel sounds are normal. There is no distension. Palpations: Abdomen is soft. Tenderness: There is no abdominal tenderness. There is no guarding or rebound. Musculoskeletal:      Cervical back: Normal range of motion and neck supple. Neurological:      Mental Status: He is alert.    Psychiatric:         Behavior: Behavior normal.             Recent Results (from the past 672 hour(s))   Comprehensive Metabolic Panel    Collection Time: 02/25/22 11:15 AM   Result Value Ref Range    Sodium 141 136 - 145 mmol/L    Potassium 4.3 3.5 - 5.0 mmol/L    Chloride 104 98 - 111 mmol/L    CO2 23 22 - 29 mmol/L    Anion Gap 14 7 - 19 mmol/L    Glucose 103 74 - 109 mg/dL    BUN 16 6 - 20 mg/dL    CREATININE 0.8 0.5 - 1.2 mg/dL    GFR Non-African American >60 >60    GFR African American >59 >59    Calcium 9.2 8.6 - 10.0 mg/dL    Total Protein 6.9 6.6 - 8.7 g/dL    Albumin 4.4 3.5 - 5.2 g/dL    Total Bilirubin 0.5 0.2 - 1.2 mg/dL    Alkaline Phosphatase 61 40 - 130 U/L    ALT 37 5 - 41 U/L    AST 23 5 - 40 U/L   Hemoglobin A1C    Collection Time: 02/25/22 11:15 AM   Result Value Ref Range    Hemoglobin A1C 5.8 4.0 - 6.0 %   Microalbumin / Creatinine Urine Ratio    Collection Time: 02/25/22 11:15 AM   Result Value Ref Range Microalbumin, Random Urine <1.20 0.00 - 19.00 mg/dL    Creatinine, Ur 166.7 4.2 - 622.0 mg/dL    Microalbumin Creatinine Ratio see below mg/g   Lipid Panel    Collection Time: 02/25/22 11:15 AM   Result Value Ref Range    Cholesterol, Total 179 160 - 199 mg/dL    Triglycerides 117 0 - 149 mg/dL    HDL 42 (L) 55 - 121 mg/dL    LDL Calculated 114 <100 mg/dL   TSH without Reflex    Collection Time: 02/25/22 11:15 AM   Result Value Ref Range    TSH 1.340 0.270 - 4.200 uIU/mL   T4, Free    Collection Time: 02/25/22 11:15 AM   Result Value Ref Range    T4 Free 1.20 0.93 - 1.70 ng/dL   CBC Auto Differential    Collection Time: 02/25/22 11:15 AM   Result Value Ref Range    WBC 10.4 4.8 - 10.8 K/uL    RBC 5.47 4.70 - 6.10 M/uL    Hemoglobin 16.3 14.0 - 18.0 g/dL    Hematocrit 51.3 42.0 - 52.0 %    MCV 93.8 80.0 - 94.0 fL    MCH 29.8 27.0 - 31.0 pg    MCHC 31.8 (L) 33.0 - 37.0 g/dL    RDW 13.8 11.5 - 14.5 %    Platelets 299 142 - 589 K/uL    MPV 10.9 9.4 - 12.4 fL    Neutrophils % 55.2 50.0 - 65.0 %    Lymphocytes % 33.1 20.0 - 40.0 %    Monocytes % 9.3 0.0 - 10.0 %    Eosinophils % 1.1 0.0 - 5.0 %    Basophils % 0.7 0.0 - 1.0 %    Neutrophils Absolute 5.7 1.5 - 7.5 K/uL    Immature Granulocytes # 0.1 K/uL    Lymphocytes Absolute 3.4 1.1 - 4.5 K/uL    Monocytes Absolute 1.00 (H) 0.00 - 0.90 K/uL    Eosinophils Absolute 0.10 0.00 - 0.60 K/uL    Basophils Absolute 0.10 0.00 - 0.20 K/uL       Lab Results   Component Value Date    LABA1C 5.8 02/25/2022    LABA1C 5.8 09/20/2021    LABA1C 7.6 (H) 05/18/2021     Lab Results   Component Value Date    LABMICR <1.20 02/25/2022    LDLCALC 114 02/25/2022    CREATININE 0.8 02/25/2022       Lab Results   Component Value Date    CHOL 179 02/25/2022    CHOL 171 09/20/2021    CHOL 197 05/18/2021     Lab Results   Component Value Date    TRIG 117 02/25/2022    TRIG 81 09/20/2021    TRIG 134 05/18/2021     Lab Results   Component Value Date    HDL 42 (L) 02/25/2022    HDL 40 (L) 09/20/2021 HDL 41 (L) 05/18/2021     Lab Results   Component Value Date    LDLCALC 114 02/25/2022    LDLCALC 115 09/20/2021    LDLCALC 129 05/18/2021     No results found for: LABVLDL, VLDL  No results found for: CHOLHDLRATIO        Assessment & Plan: The following diagnoses and conditions are stable with no further orders unless indicated:  1. Type 2 diabetes mellitus without complication, without long-term current use of insulin (HCC)  Lab Results   Component Value Date    LABA1C 5.8 02/25/2022    LABA1C 5.8 09/20/2021    LABA1C 7.6 (H) 05/18/2021     Lab Results   Component Value Date    LABMICR <1.20 02/25/2022    LDLCALC 114 02/25/2022    CREATININE 0.8 02/25/2022   A1c remains stable. He continues to take Metformin 500 mg with breakfast.  Discussed lifestyle changes such as diet and exercise. Recommended eliminate processed food from diet such as sugar and fried foods. Recommended exercising at least 150 minutes/week. Try to do full body resistance training twice a week as well. Offered suggestions for calorie counting such as phone apps and online resources such as Chibwe pal and Lose it. Discussed healthy weight. Given prescription for glucometer today. - Misc. Devices MISC; Glucometer  ICD 10: E11.9  Dispense: 1 each; Refill: 0  - Lancet Device MISC; 1 lancet per strip. Dispense: 30 each; Refill: 5  - blood glucose monitor strips; 1 strip by Other route daily  Dispense: 30 strip; Refill: 11  - Comprehensive Metabolic Panel; Future  - Hemoglobin A1C; Future    2.  Familial hypercholesterolemia  Lab Results   Component Value Date    CHOL 179 02/25/2022    CHOL 171 09/20/2021    CHOL 197 05/18/2021     Lab Results   Component Value Date    TRIG 117 02/25/2022    TRIG 81 09/20/2021    TRIG 134 05/18/2021     Lab Results   Component Value Date    HDL 42 (L) 02/25/2022    HDL 40 (L) 09/20/2021    HDL 41 (L) 05/18/2021     Lab Results   Component Value Date    LDLCALC 114 02/25/2022    LDLCALC 115 09/20/2021    [facility] 129 05/18/2021     No results found for: LABVLDL, VLDL  No results found for: CHOLHDLRATIO  Cholesterol continues to steadily improve. Very possible, with lifestyle modification, he will no longer be diabetic. Encourage lifestyle changes  - Lipid Panel; Future    3. Essential (primary) hypertension  BP Readings from Last 3 Encounters:   03/17/22 128/80   02/15/22 136/85   01/31/22 122/72     Stable continue with losartan 3 mg daily  - CBC with Auto Differential; Future    4. SARY on CPAP  5. Narcolepsy without cataplexy  Continue with recommendations per sleep specialist  Remains on Adderall    6. Hydradenitis  He has recurrent hidradenitis. He is noticed some increased inflammation over the past few months. Doxycycline 100 mg twice a day improved his acute flareup. He would like to try something for maintenance. Advised him to start this after he has completed his antibiotics for otitis media  - doxycycline monohydrate (MONODOX) 100 MG capsule; Take 1 capsule by mouth daily  Dispense: 30 capsule; Refill: 2    7. Non-recurrent acute suppurative otitis media of right ear without spontaneous rupture of tympanic membrane  Flonase 1 sprays nostril twice a day  - cefdinir (OMNICEF) 300 MG capsule; Take 1 capsule by mouth 2 times daily for 10 days  Dispense: 20 capsule; Refill: 0            Return in about 6 months (around 9/17/2022) for Yearly Physical - 30 minute visit. Discussed use, benefit, and side effects of prescribed medications. All patient questions answered. Pt voiced understanding. Reviewed health maintenance. Instructedto continue current medications, diet and exercise. Patient agreed with treatmentplan.  Follow up as directed.     _______________________________________________________________      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)     Obstructive sleep apnea     AHI:  29.0 per PSG, 2012      No past surgical history on file. Family History   Problem Relation Age of Onset    Diabetes Mother     Hypertension Mother     Asthma Mother     Coronary Art Dis Father     Heart Attack Father     Hypertension Father     Cancer Maternal Grandmother         kidney    Stroke Paternal Grandfather     Cancer Paternal Grandfather         lung       Social History     Tobacco Use    Smoking status: Never Smoker    Smokeless tobacco: Never Used   Substance Use Topics    Alcohol use: Yes     Comment: twice a year social     Current Outpatient Medications   Medication Sig Dispense Refill    Misc. Devices MISC Glucometer  ICD 10: E11.9 1 each 0    Lancet Device MISC 1 lancet per strip. 30 each 5    blood glucose monitor strips 1 strip by Other route daily 30 strip 11    cefdinir (OMNICEF) 300 MG capsule Take 1 capsule by mouth 2 times daily for 10 days 20 capsule 0    doxycycline monohydrate (MONODOX) 100 MG capsule Take 1 capsule by mouth daily 30 capsule 2    amphetamine-dextroamphetamine (ADDERALL XR) 30 MG extended release capsule Take 1 capsule by mouth 2 times daily for 30 days. 60 capsule 0    metFORMIN (GLUCOPHAGE) 500 MG tablet TAKE 1 TABLET BY MOUTH TWICE A DAY WITH MEALS (Patient taking differently: Take 500 mg by mouth daily (with breakfast) ) 180 tablet 1    irbesartan (AVAPRO) 300 MG tablet Take 1 tablet by mouth daily 90 tablet 3    albuterol sulfate  (90 Base) MCG/ACT inhaler TAKE 2 PUFFS BY MOUTH EVERY 6 HOURS AS NEEDED FOR WHEEZE 6.7 Inhaler 0     No current facility-administered medications for this visit.      No Known Allergies    Health Maintenance   Topic Date Due    Hepatitis C screen  Never done    Pneumococcal 0-64 years Vaccine (1 of 2 - PPSV23) Never done    Diabetic foot exam  Never done    HIV screen  Never done    Diabetic retinal exam  Never done   Kelly Current Hepatitis B vaccine (1 of 3 - Risk 3-dose series) Never done    DTaP/Tdap/Td vaccine (1 - Tdap) 05/02/2012    COVID-19 Vaccine (1) 09/01/2022 (Originally [birth year]/6/1984)    Flu vaccine (1) 03/17/2023 (Originally 9/1/2021)    Depression Screen  09/16/2022    A1C test (Diabetic or Prediabetic)  02/25/2023    Diabetic microalbuminuria test  02/25/2023    Lipid screen  02/25/2023    Potassium monitoring  02/25/2023    Creatinine monitoring  02/25/2023    Hepatitis A vaccine  Aged Out    Hib vaccine  Aged Out    Meningococcal (ACWY) vaccine  Aged Out       _______________________________________________________________    Note dictated using Dragon Dictation software  Sometimes this dictation software makes erroneous transcriptions.

## 2022-03-17 NOTE — PATIENT INSTRUCTIONS
We are committed to providing you with the best care possible. In order to help us achieve these goals please remember to bring all medications, herbal products, and over the counter supplements with you to each visit. If your provider has ordered testing for you, please be sure to follow up with our office if you have not received results within 7 days after the testing took place. *If you receive a survey after visiting one of our offices, please take time to share your experience concerning your physician office visit. These surveys are confidential and no health information about you is shared. We are eager to improve for you and we are counting on your feedback to help make that happen.      _______________________________________________________________    Fasting glucose between 70 and 130  After eating: keep glucose less than 180, preferably below 150.

## 2022-04-11 DIAGNOSIS — G47.419 NARCOLEPSY WITHOUT CATAPLEXY: ICD-10-CM

## 2022-04-11 RX ORDER — DEXTROAMPHETAMINE SACCHARATE, AMPHETAMINE ASPARTATE MONOHYDRATE, DEXTROAMPHETAMINE SULFATE AND AMPHETAMINE SULFATE 7.5; 7.5; 7.5; 7.5 MG/1; MG/1; MG/1; MG/1
30 CAPSULE, EXTENDED RELEASE ORAL 2 TIMES DAILY
Qty: 60 CAPSULE | Refills: 0 | Status: SHIPPED | OUTPATIENT
Start: 2022-04-11 | End: 2022-05-09

## 2022-04-11 NOTE — TELEPHONE ENCOUNTER
Requested Prescriptions     Pending Prescriptions Disp Refills    amphetamine-dextroamphetamine (ADDERALL XR) 30 MG extended release capsule 60 capsule 0     Sig: Take 1 capsule by mouth 2 times daily for 30 days.        Last Office Visit:  2/15/2022  Next Office Visit:  8/8/2022  Last Medication Refill:  3/7/22  Dmitry Hanna up to date:  4/11/22    *RX updated to reflect   4/11/22  fill date*

## 2022-05-05 RX ORDER — PITOLISANT HYDROCHLORIDE 17.8 MG/1
TABLET, FILM COATED ORAL
Qty: 180 TABLET | Refills: 3 | OUTPATIENT
Start: 2022-05-05

## 2022-05-05 NOTE — TELEPHONE ENCOUNTER
Patient needs script for [facility] to [facility] specialty pharmacy.

## 2022-05-05 NOTE — TELEPHONE ENCOUNTER
I cannot prescribe the way his insurance wants. Deandre Calvillo will not let me prescribe saying that [facility] special pharmacy does not accept E scribed Rxs.

## 2022-05-09 DIAGNOSIS — G47.419 NARCOLEPSY WITHOUT CATAPLEXY: ICD-10-CM

## 2022-05-09 RX ORDER — DEXTROAMPHETAMINE SACCHARATE, AMPHETAMINE ASPARTATE MONOHYDRATE, DEXTROAMPHETAMINE SULFATE AND AMPHETAMINE SULFATE 7.5; 7.5; 7.5; 7.5 MG/1; MG/1; MG/1; MG/1
30 CAPSULE, EXTENDED RELEASE ORAL 2 TIMES DAILY
Qty: 60 CAPSULE | Refills: 0 | Status: SHIPPED | OUTPATIENT
Start: 2022-05-11 | End: 2022-06-13 | Stop reason: SDUPTHER

## 2022-05-09 NOTE — TELEPHONE ENCOUNTER
Requested Prescriptions     Pending Prescriptions Disp Refills    amphetamine-dextroamphetamine (ADDERALL XR) 30 MG extended release capsule 60 capsule 0     Sig: Take 1 capsule by mouth 2 times daily for 30 days.        Last Office Visit:  2/15/2022  Next Office Visit:  8/8/2022  Last Medication Refill:  4/11/22  Colby Schaumann up to date:  4/11/22    *RX updated to reflect   5/11/22  fill date*

## 2022-05-11 NOTE — TELEPHONE ENCOUNTER
Called [facility] at [phone], and spoke to Tracy. She did show that the scripts for Wakix titration and the maintenance dose, they did go through. Patients insurance was given to pharmacy, they will verify and ship medication. Direct phone number to the [facility] team is [phone].

## 2022-05-25 ENCOUNTER — OFFICE VISIT (OUTPATIENT)
Dept: FAMILY MEDICINE CLINIC | Age: 42
End: 2022-05-25
Payer: OTHER MISCELLANEOUS

## 2022-05-25 ENCOUNTER — HOSPITAL ENCOUNTER (OUTPATIENT)
Dept: GENERAL RADIOLOGY | Age: 42
Discharge: HOME | End: 2022-05-25
Admitting: CLINICAL NURSE SPECIALIST
Payer: OTHER MISCELLANEOUS

## 2022-05-25 VITALS
SYSTOLIC BLOOD PRESSURE: 136 MMHG | HEIGHT: 69 IN | TEMPERATURE: 97.2 F | WEIGHT: 294.6 LBS | BODY MASS INDEX: 43.63 KG/M2 | HEART RATE: 80 BPM | RESPIRATION RATE: 18 BRPM | DIASTOLIC BLOOD PRESSURE: 82 MMHG | OXYGEN SATURATION: 97 %

## 2022-05-25 DIAGNOSIS — M54.50 ACUTE MIDLINE LOW BACK PAIN WITHOUT SCIATICA: ICD-10-CM

## 2022-05-25 DIAGNOSIS — M54.2 ACUTE NECK PAIN: ICD-10-CM
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
## 2022-05-25 DIAGNOSIS — S06.0X0A CONCUSSION WITHOUT LOSS OF CONSCIOUSNESS, INITIAL ENCOUNTER: ICD-10-CM

## 2022-05-25 PROCEDURE — G8417 CALC BMI ABV UP PARAM F/U: HCPCS | Performed by: CLINICAL NURSE SPECIALIST

## 2022-05-25 PROCEDURE — 99213 OFFICE O/P EST LOW 20 MIN: CPT | Performed by: CLINICAL NURSE SPECIALIST

## 2022-05-25 PROCEDURE — G8427 DOCREV CUR MEDS BY ELIG CLIN: HCPCS | Performed by: CLINICAL NURSE SPECIALIST

## 2022-05-25 PROCEDURE — 72100 X-RAY EXAM L-S SPINE 2/3 VWS: CPT

## 2022-05-25 PROCEDURE — 1036F TOBACCO NON-USER: CPT | Performed by: CLINICAL NURSE SPECIALIST

## 2022-05-25 PROCEDURE — 72040 X-RAY EXAM NECK SPINE 2-3 VW: CPT

## 2022-05-25 RX ORDER — TIZANIDINE 2 MG/1
2 TABLET ORAL 3 TIMES DAILY PRN
Qty: 30 TABLET | Refills: 0 | Status: SHIPPED | OUTPATIENT
Start: 2022-05-25 | End: 2022-06-20

## 2022-05-25 RX ORDER — PITOLISANT HYDROCHLORIDE 17.8 MG/1
TABLET, FILM COATED ORAL
COMMUNITY
Start: 2022-05-12 | End: 2022-06-28 | Stop reason: ALTCHOICE

## 2022-05-25 RX ORDER — PITOLISANT HYDROCHLORIDE 4.45 MG/1
TABLET, FILM COATED ORAL
COMMUNITY
Start: 2022-05-12 | End: 2022-06-28 | Stop reason: ALTCHOICE

## 2022-05-25 ASSESSMENT — PATIENT HEALTH QUESTIONNAIRE - PHQ9
SUM OF ALL RESPONSES TO PHQ QUESTIONS 1-9: 0
2. FEELING DOWN, DEPRESSED OR HOPELESS: 0
SUM OF ALL RESPONSES TO PHQ9 QUESTIONS 1 & 2: 0
SUM OF ALL RESPONSES TO PHQ QUESTIONS 1-9: 0
1. LITTLE INTEREST OR PLEASURE IN DOING THINGS: 0
SUM OF ALL RESPONSES TO PHQ QUESTIONS 1-9: 0
SUM OF ALL RESPONSES TO PHQ QUESTIONS 1-9: 0
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
## 2022-05-25 NOTE — LETTER
[address] [place] [address] [address] [facility] [address]  Phone: [phone]  Fax: [phone]    DONALD Armendariz        May 25, 2022     Patient: Kenyon Ramos   YOB: 1980   Date of Visit: 5/25/2022       To Whom it May Concern:    Sammy Patel was seen in my clinic on 5/25/2022. He may return to work on 5/28/2022, or next scheduled shift after this date. If you have any questions or concerns, please don't hesitate to call.     Sincerely,         DONALD Armendariz

## 2022-05-27 ASSESSMENT — ENCOUNTER SYMPTOMS
SINUS PRESSURE: 0
ABDOMINAL PAIN: 0
DIARRHEA: 0
COLOR CHANGE: 0
WHEEZING: 0
CHEST TIGHTNESS: 0
EYE DISCHARGE: 0
SHORTNESS OF BREATH: 0
NAUSEA: 0
TROUBLE SWALLOWING: 0
EYE PAIN: 0
COUGH: 0
VOMITING: 0
CONSTIPATION: 0
BACK PAIN: 1
SORE THROAT: 0

## 2022-06-13 DIAGNOSIS — G47.419 NARCOLEPSY WITHOUT CATAPLEXY: ICD-10-CM

## 2022-06-13 RX ORDER — DEXTROAMPHETAMINE SACCHARATE, AMPHETAMINE ASPARTATE MONOHYDRATE, DEXTROAMPHETAMINE SULFATE AND AMPHETAMINE SULFATE 7.5; 7.5; 7.5; 7.5 MG/1; MG/1; MG/1; MG/1
30 CAPSULE, EXTENDED RELEASE ORAL 2 TIMES DAILY
Qty: 60 CAPSULE | Refills: 0 | Status: SHIPPED | OUTPATIENT
Start: 2022-06-13 | End: 2022-07-12 | Stop reason: SDUPTHER

## 2022-06-13 NOTE — TELEPHONE ENCOUNTER
Requested Prescriptions     Pending Prescriptions Disp Refills    amphetamine-dextroamphetamine (ADDERALL XR) 30 MG extended release capsule 60 capsule 0     Sig: Take 1 capsule by mouth 2 times daily for 30 days.        Last Office Visit:  2/15/2022  Next Office Visit:  8/8/2022  Last Medication Refill:  5/11/2022   Amna Gaffney up to date:  4/11/2022    *RX updated to reflect   6/13/2022  fill date*

## 2022-06-21 ENCOUNTER — ANESTHESIA (OUTPATIENT)
Dept: OPERATING ROOM | Age: 42
DRG: 872 | End: 2022-06-21
Payer: COMMERCIAL

## 2022-06-21 ENCOUNTER — ANESTHESIA EVENT (OUTPATIENT)
Dept: OPERATING ROOM | Age: 42
DRG: 872 | End: 2022-06-21
Payer: COMMERCIAL

## 2022-06-21 ENCOUNTER — HOSPITAL ENCOUNTER (INPATIENT)
Age: 42
LOS: 1 days | Discharge: HOME | DRG: 872 | End: 2022-06-24
Attending: EMERGENCY MEDICINE | Admitting: SURGERY
Payer: COMMERCIAL

## 2022-06-21 ENCOUNTER — APPOINTMENT (OUTPATIENT)
Dept: CT IMAGING | Age: 42
DRG: 872 | End: 2022-06-21
Payer: COMMERCIAL

## 2022-06-21 DIAGNOSIS — K35.33 ACUTE APPENDICITIS WITH PERFORATION, LOCALIZED PERITONITIS, AND ABSCESS, UNSPECIFIED WHETHER GANGRENE PRESENT: ICD-10-CM

## 2022-06-21 DIAGNOSIS — K35.20 ACUTE APPENDICITIS WITH GENERALIZED PERITONITIS, WITHOUT ABSCESS, UNSPECIFIED WHETHER GANGRENE PRESENT, UNSPECIFIED WHETHER PERFORATION PRESENT: Primary | ICD-10-CM

## 2022-06-21 DIAGNOSIS — E86.0 DEHYDRATION: ICD-10-CM

## 2022-06-21 DIAGNOSIS — R79.82 ELEVATED C-REACTIVE PROTEIN (CRP): ICD-10-CM

## 2022-06-21 DIAGNOSIS — R70.0 ELEVATED SED RATE: ICD-10-CM

## 2022-06-21 PROBLEM — E66.813 CLASS 3 SEVERE OBESITY DUE TO EXCESS CALORIES WITH SERIOUS COMORBIDITY AND BODY MASS INDEX (BMI) OF 40.0 TO 44.9 IN ADULT: Status: ACTIVE | Noted: 2018-07-10

## 2022-06-21 PROBLEM — K35.80 ACUTE APPENDICITIS: Status: ACTIVE | Noted: 2022-06-21

## 2022-06-21 LAB
ALBUMIN SERPL-MCNC: 4 G/DL (ref 3.5–5.2)
ALP BLD-CCNC: 61 U/L (ref 40–130)
ALT SERPL-CCNC: 15 U/L (ref 5–41)
ANION GAP SERPL CALCULATED.3IONS-SCNC: 9 MMOL/L (ref 7–19)
AST SERPL-CCNC: 11 U/L (ref 5–40)
BASOPHILS ABSOLUTE: 0.1 K/UL (ref 0–0.2)
BASOPHILS RELATIVE PERCENT: 0.4 % (ref 0–1)
BILIRUB SERPL-MCNC: 0.6 MG/DL (ref 0.2–1.2)
BILIRUBIN URINE: NEGATIVE
BLOOD, URINE: NEGATIVE
BUN BLDV-MCNC: 14 MG/DL (ref 6–20)
C-REACTIVE PROTEIN: 11.2 MG/DL (ref 0–0.5)
CALCIUM SERPL-MCNC: 9 MG/DL (ref 8.6–10)
CHLORIDE BLD-SCNC: 103 MMOL/L (ref 98–111)
CLARITY: ABNORMAL
CO2: 27 MMOL/L (ref 22–29)
COLOR: ABNORMAL
CREAT SERPL-MCNC: 0.9 MG/DL (ref 0.5–1.2)
EKG P AXIS: 25 DEGREES
EKG P-R INTERVAL: 136 MS
EKG Q-T INTERVAL: 372 MS
EKG QRS DURATION: 98 MS
EKG QTC CALCULATION (BAZETT): 421 MS
EKG T AXIS: 62 DEGREES
EOSINOPHILS ABSOLUTE: 0.1 K/UL (ref 0–0.6)
EOSINOPHILS RELATIVE PERCENT: 0.6 % (ref 0–5)
GFR AFRICAN AMERICAN: >59
GFR NON-AFRICAN AMERICAN: >60
GLUCOSE BLD-MCNC: 114 MG/DL (ref 70–99)
GLUCOSE BLD-MCNC: 125 MG/DL (ref 74–109)
GLUCOSE BLD-MCNC: 156 MG/DL (ref 70–99)
GLUCOSE BLD-MCNC: 162 MG/DL (ref 70–99)
GLUCOSE URINE: NEGATIVE MG/DL
HCT VFR BLD CALC: 49.3 % (ref 42–52)
HEMOGLOBIN: 15.4 G/DL (ref 14–18)
IMMATURE GRANULOCYTES #: 0.1 K/UL
KETONES, URINE: ABNORMAL MG/DL
LEUKOCYTE ESTERASE, URINE: NEGATIVE
LIPASE: 21 U/L (ref 13–60)
LYMPHOCYTES ABSOLUTE: 3.2 K/UL (ref 1.1–4.5)
LYMPHOCYTES RELATIVE PERCENT: 19.7 % (ref 20–40)
MCH RBC QN AUTO: 29.9 PG (ref 27–31)
MCHC RBC AUTO-ENTMCNC: 31.2 G/DL (ref 33–37)
MCV RBC AUTO: 95.7 FL (ref 80–94)
MONOCYTES ABSOLUTE: 1.5 K/UL (ref 0–0.9)
MONOCYTES RELATIVE PERCENT: 9.4 % (ref 0–10)
NEUTROPHILS ABSOLUTE: 11.2 K/UL (ref 1.5–7.5)
NEUTROPHILS RELATIVE PERCENT: 69.3 % (ref 50–65)
NITRITE, URINE: NEGATIVE
PDW BLD-RTO: 14 % (ref 11.5–14.5)
PERFORMED ON: ABNORMAL
PH UA: 5.5 (ref 5–8)
PLATELET # BLD: 327 K/UL (ref 130–400)
PMV BLD AUTO: 10.6 FL (ref 9.4–12.4)
POTASSIUM SERPL-SCNC: 3.9 MMOL/L (ref 3.5–5)
PROTEIN UA: ABNORMAL MG/DL
RBC # BLD: 5.15 M/UL (ref 4.7–6.1)
SARS-COV-2, NAAT: NOT DETECTED
SEDIMENTATION RATE, ERYTHROCYTE: 21 MM/HR (ref 0–10)
SODIUM BLD-SCNC: 139 MMOL/L (ref 136–145)
SPECIFIC GRAVITY UA: 1.03 (ref 1–1.03)
TOTAL PROTEIN: 7 G/DL (ref 6.6–8.7)
UROBILINOGEN, URINE: 1 E.U./DL
WBC # BLD: 16.1 K/UL (ref 4.8–10.8)

## 2022-06-21 PROCEDURE — 80053 COMPREHEN METABOLIC PANEL: CPT

## 2022-06-21 PROCEDURE — 2700000000 HC OXYGEN THERAPY PER DAY

## 2022-06-21 PROCEDURE — 99285 EMERGENCY DEPT VISIT HI MDM: CPT

## 2022-06-21 PROCEDURE — 99223 1ST HOSP IP/OBS HIGH 75: CPT | Performed by: SURGERY

## 2022-06-21 PROCEDURE — 6360000002 HC RX W HCPCS: Performed by: SURGERY

## 2022-06-21 PROCEDURE — 6370000000 HC RX 637 (ALT 250 FOR IP): Performed by: SURGERY

## 2022-06-21 PROCEDURE — 81003 URINALYSIS AUTO W/O SCOPE: CPT

## 2022-06-21 PROCEDURE — 85652 RBC SED RATE AUTOMATED: CPT

## 2022-06-21 PROCEDURE — 2580000003 HC RX 258: Performed by: NURSE ANESTHETIST, CERTIFIED REGISTERED

## 2022-06-21 PROCEDURE — 2580000003 HC RX 258: Performed by: ANESTHESIOLOGY

## 2022-06-21 PROCEDURE — 2580000003 HC RX 258: Performed by: SURGERY

## 2022-06-21 PROCEDURE — 36415 COLL VENOUS BLD VENIPUNCTURE: CPT

## 2022-06-21 PROCEDURE — 3700000000 HC ANESTHESIA ATTENDED CARE: Performed by: SURGERY

## 2022-06-21 PROCEDURE — 74177 CT ABD & PELVIS W/CONTRAST: CPT

## 2022-06-21 PROCEDURE — G0378 HOSPITAL OBSERVATION PER HR: HCPCS

## 2022-06-21 PROCEDURE — 82947 ASSAY GLUCOSE BLOOD QUANT: CPT

## 2022-06-21 PROCEDURE — 93010 ELECTROCARDIOGRAM REPORT: CPT | Performed by: INTERNAL MEDICINE

## 2022-06-21 PROCEDURE — 3700000001 HC ADD 15 MINUTES (ANESTHESIA): Performed by: SURGERY

## 2022-06-21 PROCEDURE — 0D9J30Z DRAINAGE OF APPENDIX WITH DRAINAGE DEVICE, PERCUTANEOUS APPROACH: ICD-10-PCS | Performed by: SURGERY

## 2022-06-21 PROCEDURE — 74177 CT ABD & PELVIS W/CONTRAST: CPT | Performed by: RADIOLOGY

## 2022-06-21 PROCEDURE — 96374 THER/PROPH/DIAG INJ IV PUSH: CPT

## 2022-06-21 PROCEDURE — 2580000003 HC RX 258: Performed by: EMERGENCY MEDICINE

## 2022-06-21 PROCEDURE — 93005 ELECTROCARDIOGRAM TRACING: CPT | Performed by: EMERGENCY MEDICINE

## 2022-06-21 PROCEDURE — 2709999900 HC NON-CHARGEABLE SUPPLY: Performed by: SURGERY

## 2022-06-21 PROCEDURE — 3600000014 HC SURGERY LEVEL 4 ADDTL 15MIN: Performed by: SURGERY

## 2022-06-21 PROCEDURE — 7100000000 HC PACU RECOVERY - FIRST 15 MIN: Performed by: SURGERY

## 2022-06-21 PROCEDURE — 3600000004 HC SURGERY LEVEL 4 BASE: Performed by: SURGERY

## 2022-06-21 PROCEDURE — 6360000002 HC RX W HCPCS: Performed by: NURSE ANESTHETIST, CERTIFIED REGISTERED

## 2022-06-21 PROCEDURE — 85025 COMPLETE CBC W/AUTO DIFF WBC: CPT

## 2022-06-21 PROCEDURE — 86140 C-REACTIVE PROTEIN: CPT

## 2022-06-21 PROCEDURE — 7100000001 HC PACU RECOVERY - ADDTL 15 MIN: Performed by: SURGERY

## 2022-06-21 PROCEDURE — 2720000010 HC SURG SUPPLY STERILE: Performed by: SURGERY

## 2022-06-21 PROCEDURE — 6360000004 HC RX CONTRAST MEDICATION: Performed by: EMERGENCY MEDICINE

## 2022-06-21 PROCEDURE — 2500000003 HC RX 250 WO HCPCS: Performed by: NURSE ANESTHETIST, CERTIFIED REGISTERED

## 2022-06-21 PROCEDURE — 83690 ASSAY OF LIPASE: CPT

## 2022-06-21 PROCEDURE — 6360000002 HC RX W HCPCS: Performed by: EMERGENCY MEDICINE

## 2022-06-21 PROCEDURE — 87635 SARS-COV-2 COVID-19 AMP PRB: CPT

## 2022-06-21 PROCEDURE — 96375 TX/PRO/DX INJ NEW DRUG ADDON: CPT

## 2022-06-21 PROCEDURE — 2500000003 HC RX 250 WO HCPCS: Performed by: SURGERY

## 2022-06-21 PROCEDURE — 49324 LAP INSERT TUNNEL IP CATH: CPT | Performed by: SURGERY

## 2022-06-21 RX ORDER — FENTANYL CITRATE 50 UG/ML
INJECTION, SOLUTION INTRAMUSCULAR; INTRAVENOUS PRN
Status: DISCONTINUED | OUTPATIENT
Start: 2022-06-21 | End: 2022-06-21 | Stop reason: SDUPTHER

## 2022-06-21 RX ORDER — DEXAMETHASONE SODIUM PHOSPHATE 10 MG/ML
INJECTION, SOLUTION INTRAMUSCULAR; INTRAVENOUS PRN
Status: DISCONTINUED | OUTPATIENT
Start: 2022-06-21 | End: 2022-06-21 | Stop reason: SDUPTHER

## 2022-06-21 RX ORDER — SODIUM CHLORIDE 9 MG/ML
INJECTION, SOLUTION INTRAVENOUS PRN
Status: DISCONTINUED | OUTPATIENT
Start: 2022-06-21 | End: 2022-06-21

## 2022-06-21 RX ORDER — ROCURONIUM BROMIDE 10 MG/ML
INJECTION, SOLUTION INTRAVENOUS PRN
Status: DISCONTINUED | OUTPATIENT
Start: 2022-06-21 | End: 2022-06-21 | Stop reason: SDUPTHER

## 2022-06-21 RX ORDER — ACETAMINOPHEN 325 MG/1
650 TABLET ORAL EVERY 4 HOURS PRN
Status: DISCONTINUED | OUTPATIENT
Start: 2022-06-21 | End: 2022-06-24 | Stop reason: HOSPADM

## 2022-06-21 RX ORDER — SODIUM CHLORIDE 9 MG/ML
INJECTION, SOLUTION INTRAVENOUS PRN
Status: DISCONTINUED | OUTPATIENT
Start: 2022-06-21 | End: 2022-06-21 | Stop reason: HOSPADM

## 2022-06-21 RX ORDER — ONDANSETRON 2 MG/ML
INJECTION INTRAMUSCULAR; INTRAVENOUS PRN
Status: DISCONTINUED | OUTPATIENT
Start: 2022-06-21 | End: 2022-06-21 | Stop reason: SDUPTHER

## 2022-06-21 RX ORDER — ONDANSETRON 4 MG/1
4 TABLET, ORALLY DISINTEGRATING ORAL EVERY 8 HOURS PRN
Status: DISCONTINUED | OUTPATIENT
Start: 2022-06-21 | End: 2022-06-21

## 2022-06-21 RX ORDER — SODIUM CHLORIDE 0.9 % (FLUSH) 0.9 %
5-40 SYRINGE (ML) INJECTION PRN
Status: DISCONTINUED | OUTPATIENT
Start: 2022-06-21 | End: 2022-06-24 | Stop reason: HOSPADM

## 2022-06-21 RX ORDER — SODIUM CHLORIDE 0.9 % (FLUSH) 0.9 %
5-40 SYRINGE (ML) INJECTION EVERY 12 HOURS SCHEDULED
Status: DISCONTINUED | OUTPATIENT
Start: 2022-06-21 | End: 2022-06-24 | Stop reason: HOSPADM

## 2022-06-21 RX ORDER — ONDANSETRON 2 MG/ML
4 INJECTION INTRAMUSCULAR; INTRAVENOUS ONCE
Status: COMPLETED | OUTPATIENT
Start: 2022-06-21 | End: 2022-06-21

## 2022-06-21 RX ORDER — ENOXAPARIN SODIUM 100 MG/ML
30 INJECTION SUBCUTANEOUS 2 TIMES DAILY
Status: DISCONTINUED | OUTPATIENT
Start: 2022-06-21 | End: 2022-06-21

## 2022-06-21 RX ORDER — HYDROMORPHONE HYDROCHLORIDE 1 MG/ML
1 INJECTION, SOLUTION INTRAMUSCULAR; INTRAVENOUS; SUBCUTANEOUS ONCE
Status: COMPLETED | OUTPATIENT
Start: 2022-06-21 | End: 2022-06-21

## 2022-06-21 RX ORDER — KETOROLAC TROMETHAMINE 30 MG/ML
INJECTION, SOLUTION INTRAMUSCULAR; INTRAVENOUS PRN
Status: DISCONTINUED | OUTPATIENT
Start: 2022-06-21 | End: 2022-06-21 | Stop reason: SDUPTHER

## 2022-06-21 RX ORDER — SODIUM CHLORIDE, SODIUM LACTATE, POTASSIUM CHLORIDE, CALCIUM CHLORIDE 600; 310; 30; 20 MG/100ML; MG/100ML; MG/100ML; MG/100ML
INJECTION, SOLUTION INTRAVENOUS CONTINUOUS PRN
Status: DISCONTINUED | OUTPATIENT
Start: 2022-06-21 | End: 2022-06-21 | Stop reason: SDUPTHER

## 2022-06-21 RX ORDER — MORPHINE SULFATE 2 MG/ML
2 INJECTION, SOLUTION INTRAMUSCULAR; INTRAVENOUS
Status: DISCONTINUED | OUTPATIENT
Start: 2022-06-21 | End: 2022-06-21

## 2022-06-21 RX ORDER — MIDAZOLAM HYDROCHLORIDE 1 MG/ML
INJECTION INTRAMUSCULAR; INTRAVENOUS PRN
Status: DISCONTINUED | OUTPATIENT
Start: 2022-06-21 | End: 2022-06-21 | Stop reason: SDUPTHER

## 2022-06-21 RX ORDER — SODIUM CHLORIDE, SODIUM LACTATE, POTASSIUM CHLORIDE, CALCIUM CHLORIDE 600; 310; 30; 20 MG/100ML; MG/100ML; MG/100ML; MG/100ML
INJECTION, SOLUTION INTRAVENOUS CONTINUOUS
Status: DISCONTINUED | OUTPATIENT
Start: 2022-06-21 | End: 2022-06-21

## 2022-06-21 RX ORDER — PROPOFOL 10 MG/ML
INJECTION, EMULSION INTRAVENOUS PRN
Status: DISCONTINUED | OUTPATIENT
Start: 2022-06-21 | End: 2022-06-21 | Stop reason: SDUPTHER

## 2022-06-21 RX ORDER — OXYCODONE HYDROCHLORIDE 5 MG/1
5 TABLET ORAL EVERY 4 HOURS PRN
Status: DISCONTINUED | OUTPATIENT
Start: 2022-06-21 | End: 2022-06-23

## 2022-06-21 RX ORDER — ENOXAPARIN SODIUM 100 MG/ML
40 INJECTION SUBCUTANEOUS DAILY
Status: DISCONTINUED | OUTPATIENT
Start: 2022-06-22 | End: 2022-06-23

## 2022-06-21 RX ORDER — SODIUM CHLORIDE, SODIUM LACTATE, POTASSIUM CHLORIDE, CALCIUM CHLORIDE 600; 310; 30; 20 MG/100ML; MG/100ML; MG/100ML; MG/100ML
INJECTION, SOLUTION INTRAVENOUS CONTINUOUS
Status: DISCONTINUED | OUTPATIENT
Start: 2022-06-21 | End: 2022-06-21 | Stop reason: HOSPADM

## 2022-06-21 RX ORDER — DIPHENHYDRAMINE HYDROCHLORIDE 50 MG/ML
12.5 INJECTION INTRAMUSCULAR; INTRAVENOUS
Status: DISCONTINUED | OUTPATIENT
Start: 2022-06-21 | End: 2022-06-21 | Stop reason: HOSPADM

## 2022-06-21 RX ORDER — SODIUM CHLORIDE 0.9 % (FLUSH) 0.9 %
5-40 SYRINGE (ML) INJECTION PRN
Status: DISCONTINUED | OUTPATIENT
Start: 2022-06-21 | End: 2022-06-21 | Stop reason: HOSPADM

## 2022-06-21 RX ORDER — HYDROMORPHONE HYDROCHLORIDE 1 MG/ML
1 INJECTION, SOLUTION INTRAMUSCULAR; INTRAVENOUS; SUBCUTANEOUS ONCE
Status: DISCONTINUED | OUTPATIENT
Start: 2022-06-21 | End: 2022-06-21

## 2022-06-21 RX ORDER — INSULIN LISPRO 100 [IU]/ML
0-6 INJECTION, SOLUTION INTRAVENOUS; SUBCUTANEOUS
Status: DISCONTINUED | OUTPATIENT
Start: 2022-06-21 | End: 2022-06-24 | Stop reason: HOSPADM

## 2022-06-21 RX ORDER — SODIUM CHLORIDE 9 MG/ML
1000 INJECTION, SOLUTION INTRAVENOUS CONTINUOUS
Status: DISCONTINUED | OUTPATIENT
Start: 2022-06-21 | End: 2022-06-21

## 2022-06-21 RX ORDER — SODIUM CHLORIDE 0.9 % (FLUSH) 0.9 %
5-40 SYRINGE (ML) INJECTION EVERY 12 HOURS SCHEDULED
Status: DISCONTINUED | OUTPATIENT
Start: 2022-06-21 | End: 2022-06-21 | Stop reason: HOSPADM

## 2022-06-21 RX ORDER — GLYCOPYRROLATE 0.6MG/3ML
SYRINGE (ML) INTRAVENOUS PRN
Status: DISCONTINUED | OUTPATIENT
Start: 2022-06-21 | End: 2022-06-21 | Stop reason: SDUPTHER

## 2022-06-21 RX ORDER — HYDROMORPHONE HYDROCHLORIDE 1 MG/ML
0.25 INJECTION, SOLUTION INTRAMUSCULAR; INTRAVENOUS; SUBCUTANEOUS EVERY 5 MIN PRN
Status: DISCONTINUED | OUTPATIENT
Start: 2022-06-21 | End: 2022-06-21 | Stop reason: HOSPADM

## 2022-06-21 RX ORDER — OXYCODONE HYDROCHLORIDE 5 MG/1
10 TABLET ORAL EVERY 4 HOURS PRN
Status: DISCONTINUED | OUTPATIENT
Start: 2022-06-21 | End: 2022-06-23

## 2022-06-21 RX ORDER — DOCUSATE SODIUM 100 MG/1
100 CAPSULE, LIQUID FILLED ORAL 2 TIMES DAILY
Status: DISCONTINUED | OUTPATIENT
Start: 2022-06-21 | End: 2022-06-24 | Stop reason: HOSPADM

## 2022-06-21 RX ORDER — MORPHINE SULFATE 4 MG/ML
4 INJECTION, SOLUTION INTRAMUSCULAR; INTRAVENOUS
Status: DISCONTINUED | OUTPATIENT
Start: 2022-06-21 | End: 2022-06-24 | Stop reason: HOSPADM

## 2022-06-21 RX ORDER — MORPHINE SULFATE 4 MG/ML
4 INJECTION, SOLUTION INTRAMUSCULAR; INTRAVENOUS
Status: DISCONTINUED | OUTPATIENT
Start: 2022-06-21 | End: 2022-06-21

## 2022-06-21 RX ORDER — BUPIVACAINE HYDROCHLORIDE AND EPINEPHRINE 2.5; 5 MG/ML; UG/ML
INJECTION, SOLUTION INFILTRATION; PERINEURAL PRN
Status: DISCONTINUED | OUTPATIENT
Start: 2022-06-21 | End: 2022-06-21 | Stop reason: ALTCHOICE

## 2022-06-21 RX ORDER — ONDANSETRON 4 MG/1
4 TABLET, ORALLY DISINTEGRATING ORAL EVERY 8 HOURS PRN
Status: DISCONTINUED | OUTPATIENT
Start: 2022-06-21 | End: 2022-06-24 | Stop reason: HOSPADM

## 2022-06-21 RX ORDER — METOCLOPRAMIDE HYDROCHLORIDE 5 MG/ML
10 INJECTION INTRAMUSCULAR; INTRAVENOUS
Status: COMPLETED | OUTPATIENT
Start: 2022-06-21 | End: 2022-06-21

## 2022-06-21 RX ORDER — ONDANSETRON 2 MG/ML
4 INJECTION INTRAMUSCULAR; INTRAVENOUS EVERY 6 HOURS PRN
Status: DISCONTINUED | OUTPATIENT
Start: 2022-06-21 | End: 2022-06-24 | Stop reason: HOSPADM

## 2022-06-21 RX ORDER — MORPHINE SULFATE 2 MG/ML
2 INJECTION, SOLUTION INTRAMUSCULAR; INTRAVENOUS
Status: DISCONTINUED | OUTPATIENT
Start: 2022-06-21 | End: 2022-06-24 | Stop reason: HOSPADM

## 2022-06-21 RX ORDER — SODIUM CHLORIDE 9 MG/ML
INJECTION, SOLUTION INTRAVENOUS CONTINUOUS PRN
Status: COMPLETED | OUTPATIENT
Start: 2022-06-21 | End: 2022-06-21

## 2022-06-21 RX ORDER — MEPERIDINE HYDROCHLORIDE 25 MG/ML
12.5 INJECTION INTRAMUSCULAR; INTRAVENOUS; SUBCUTANEOUS EVERY 5 MIN PRN
Status: DISCONTINUED | OUTPATIENT
Start: 2022-06-21 | End: 2022-06-21 | Stop reason: HOSPADM

## 2022-06-21 RX ORDER — ONDANSETRON 2 MG/ML
4 INJECTION INTRAMUSCULAR; INTRAVENOUS EVERY 6 HOURS PRN
Status: DISCONTINUED | OUTPATIENT
Start: 2022-06-21 | End: 2022-06-21

## 2022-06-21 RX ORDER — SODIUM CHLORIDE 9 MG/ML
INJECTION, SOLUTION INTRAVENOUS PRN
Status: DISCONTINUED | OUTPATIENT
Start: 2022-06-21 | End: 2022-06-24 | Stop reason: HOSPADM

## 2022-06-21 RX ORDER — SODIUM CHLORIDE 9 MG/ML
INJECTION, SOLUTION INTRAVENOUS CONTINUOUS
Status: DISCONTINUED | OUTPATIENT
Start: 2022-06-21 | End: 2022-06-23

## 2022-06-21 RX ORDER — HYDROMORPHONE HYDROCHLORIDE 1 MG/ML
0.5 INJECTION, SOLUTION INTRAMUSCULAR; INTRAVENOUS; SUBCUTANEOUS EVERY 5 MIN PRN
Status: DISCONTINUED | OUTPATIENT
Start: 2022-06-21 | End: 2022-06-21 | Stop reason: HOSPADM

## 2022-06-21 RX ORDER — INSULIN LISPRO 100 [IU]/ML
0-3 INJECTION, SOLUTION INTRAVENOUS; SUBCUTANEOUS NIGHTLY
Status: DISCONTINUED | OUTPATIENT
Start: 2022-06-21 | End: 2022-06-24 | Stop reason: HOSPADM

## 2022-06-21 RX ORDER — MORPHINE SULFATE 4 MG/ML
4 INJECTION, SOLUTION INTRAMUSCULAR; INTRAVENOUS ONCE
Status: COMPLETED | OUTPATIENT
Start: 2022-06-21 | End: 2022-06-21

## 2022-06-21 RX ORDER — DEXTROSE MONOHYDRATE 50 MG/ML
100 INJECTION, SOLUTION INTRAVENOUS PRN
Status: DISCONTINUED | OUTPATIENT
Start: 2022-06-21 | End: 2022-06-24 | Stop reason: HOSPADM

## 2022-06-21 RX ORDER — DEXTROAMPHETAMINE SACCHARATE, AMPHETAMINE ASPARTATE MONOHYDRATE, DEXTROAMPHETAMINE SULFATE AND AMPHETAMINE SULFATE 2.5; 2.5; 2.5; 2.5 MG/1; MG/1; MG/1; MG/1
30 CAPSULE, EXTENDED RELEASE ORAL 2 TIMES DAILY
Status: DISCONTINUED | OUTPATIENT
Start: 2022-06-21 | End: 2022-06-24 | Stop reason: HOSPADM

## 2022-06-21 RX ORDER — LIDOCAINE HYDROCHLORIDE 10 MG/ML
INJECTION, SOLUTION EPIDURAL; INFILTRATION; INTRACAUDAL; PERINEURAL PRN
Status: DISCONTINUED | OUTPATIENT
Start: 2022-06-21 | End: 2022-06-21 | Stop reason: SDUPTHER

## 2022-06-21 RX ADMIN — ONDANSETRON HYDROCHLORIDE 4 MG: 2 SOLUTION INTRAMUSCULAR; INTRAVENOUS at 11:37

## 2022-06-21 RX ADMIN — KETOROLAC TROMETHAMINE 30 MG: 30 INJECTION, SOLUTION INTRAMUSCULAR; INTRAVENOUS at 08:52

## 2022-06-21 RX ADMIN — OXYCODONE 10 MG: 5 TABLET ORAL at 17:39

## 2022-06-21 RX ADMIN — INSULIN LISPRO 1 UNITS: 100 INJECTION, SOLUTION INTRAVENOUS; SUBCUTANEOUS at 20:45

## 2022-06-21 RX ADMIN — OXYCODONE 10 MG: 5 TABLET ORAL at 21:51

## 2022-06-21 RX ADMIN — SODIUM CHLORIDE, SODIUM LACTATE, POTASSIUM CHLORIDE, AND CALCIUM CHLORIDE: 600; 310; 30; 20 INJECTION, SOLUTION INTRAVENOUS at 08:45

## 2022-06-21 RX ADMIN — HYDROMORPHONE HYDROCHLORIDE 1 MG: 1 INJECTION, SOLUTION INTRAMUSCULAR; INTRAVENOUS; SUBCUTANEOUS at 05:43

## 2022-06-21 RX ADMIN — Medication 0.2 MG: at 09:22

## 2022-06-21 RX ADMIN — SODIUM CHLORIDE: 9 INJECTION, SOLUTION INTRAVENOUS at 15:09

## 2022-06-21 RX ADMIN — PIPERACILLIN SODIUM AND TAZOBACTAM SODIUM 4500 MG: 4; .5 INJECTION, POWDER, LYOPHILIZED, FOR SOLUTION INTRAVENOUS at 17:41

## 2022-06-21 RX ADMIN — HYDROMORPHONE HYDROCHLORIDE 1 MG: 1 INJECTION, SOLUTION INTRAMUSCULAR; INTRAVENOUS; SUBCUTANEOUS at 09:50

## 2022-06-21 RX ADMIN — DEXAMETHASONE SODIUM PHOSPHATE 10 MG: 10 INJECTION, SOLUTION INTRAMUSCULAR; INTRAVENOUS at 08:52

## 2022-06-21 RX ADMIN — SUGAMMADEX 250 MG: 100 INJECTION, SOLUTION INTRAVENOUS at 09:55

## 2022-06-21 RX ADMIN — MIDAZOLAM 2 MG: 1 INJECTION INTRAMUSCULAR; INTRAVENOUS at 08:45

## 2022-06-21 RX ADMIN — SODIUM CHLORIDE, PRESERVATIVE FREE 10 ML: 5 INJECTION INTRAVENOUS at 20:44

## 2022-06-21 RX ADMIN — LIDOCAINE HYDROCHLORIDE 50 MG: 10 INJECTION, SOLUTION EPIDURAL; INFILTRATION; INTRACAUDAL; PERINEURAL at 08:52

## 2022-06-21 RX ADMIN — PROPOFOL 200 MG: 10 INJECTION, EMULSION INTRAVENOUS at 08:52

## 2022-06-21 RX ADMIN — MORPHINE SULFATE 4 MG: 4 INJECTION, SOLUTION INTRAMUSCULAR; INTRAVENOUS at 03:30

## 2022-06-21 RX ADMIN — MORPHINE SULFATE 2 MG: 2 INJECTION, SOLUTION INTRAMUSCULAR; INTRAVENOUS at 07:59

## 2022-06-21 RX ADMIN — DOCUSATE SODIUM 100 MG: 100 CAPSULE, LIQUID FILLED ORAL at 20:44

## 2022-06-21 RX ADMIN — MORPHINE SULFATE 4 MG: 4 INJECTION, SOLUTION INTRAMUSCULAR; INTRAVENOUS at 15:02

## 2022-06-21 RX ADMIN — METOCLOPRAMIDE 10 MG: 5 INJECTION, SOLUTION INTRAMUSCULAR; INTRAVENOUS at 10:38

## 2022-06-21 RX ADMIN — AMPICILLIN SODIUM AND SULBACTAM SODIUM 3000 MG: 2; 1 INJECTION, POWDER, FOR SOLUTION INTRAMUSCULAR; INTRAVENOUS at 05:47

## 2022-06-21 RX ADMIN — SODIUM CHLORIDE 1000 ML: 9 INJECTION, SOLUTION INTRAVENOUS at 03:27

## 2022-06-21 RX ADMIN — ONDANSETRON 4 MG: 2 INJECTION INTRAMUSCULAR; INTRAVENOUS at 08:52

## 2022-06-21 RX ADMIN — SODIUM CHLORIDE, SODIUM LACTATE, POTASSIUM CHLORIDE, AND CALCIUM CHLORIDE: 600; 310; 30; 20 INJECTION, SOLUTION INTRAVENOUS at 06:55

## 2022-06-21 RX ADMIN — ONDANSETRON 4 MG: 2 INJECTION INTRAMUSCULAR; INTRAVENOUS at 10:57

## 2022-06-21 RX ADMIN — FENTANYL CITRATE 100 MCG: 50 INJECTION, SOLUTION INTRAMUSCULAR; INTRAVENOUS at 08:52

## 2022-06-21 RX ADMIN — IOPAMIDOL 90 ML: 755 INJECTION, SOLUTION INTRAVENOUS at 03:52

## 2022-06-21 RX ADMIN — ROCURONIUM BROMIDE 50 MG: 10 INJECTION, SOLUTION INTRAVENOUS at 08:52

## 2022-06-21 RX ADMIN — ONDANSETRON HYDROCHLORIDE 4 MG: 2 SOLUTION INTRAMUSCULAR; INTRAVENOUS at 03:29

## 2022-06-21 RX ADMIN — PIPERACILLIN SODIUM AND TAZOBACTAM SODIUM 4500 MG: 4; .5 INJECTION, POWDER, LYOPHILIZED, FOR SOLUTION INTRAVENOUS at 11:50

## 2022-06-21 RX ADMIN — SODIUM CHLORIDE, SODIUM LACTATE, POTASSIUM CHLORIDE, AND CALCIUM CHLORIDE: 600; 310; 30; 20 INJECTION, SOLUTION INTRAVENOUS at 08:25

## 2022-06-21 ASSESSMENT — ENCOUNTER SYMPTOMS
PHOTOPHOBIA: 0
BACK PAIN: 0
SINUS PAIN: 0
BLOOD IN STOOL: 0
DIARRHEA: 0
ABDOMINAL PAIN: 1
EYE REDNESS: 0
FACIAL SWELLING: 0
EYE PAIN: 0
SHORTNESS OF BREATH: 0
NAUSEA: 1
CONSTIPATION: 0
CONSTIPATION: 1
EYE DISCHARGE: 0
VOMITING: 0
SORE THROAT: 0
WHEEZING: 0
CHEST TIGHTNESS: 0
COUGH: 0

## 2022-06-21 ASSESSMENT — PAIN DESCRIPTION - FREQUENCY: FREQUENCY: CONTINUOUS

## 2022-06-21 ASSESSMENT — PAIN SCALES - GENERAL
PAINLEVEL_OUTOF10: 7
PAINLEVEL_OUTOF10: 9
PAINLEVEL_OUTOF10: 4
PAINLEVEL_OUTOF10: 7
PAINLEVEL_OUTOF10: 8
PAINLEVEL_OUTOF10: 8
PAINLEVEL_OUTOF10: 6

## 2022-06-21 ASSESSMENT — PAIN DESCRIPTION - DESCRIPTORS
DESCRIPTORS: ACHING
DESCRIPTORS: ACHING;DISCOMFORT
DESCRIPTORS: ACHING

## 2022-06-21 ASSESSMENT — PAIN DESCRIPTION - PAIN TYPE: TYPE: SURGICAL PAIN

## 2022-06-21 ASSESSMENT — PAIN DESCRIPTION - LOCATION
LOCATION: ABDOMEN

## 2022-06-21 ASSESSMENT — PAIN DESCRIPTION - ORIENTATION: ORIENTATION: RIGHT;LOWER

## 2022-06-21 ASSESSMENT — PAIN DESCRIPTION - ONSET: ONSET: ON-GOING

## 2022-06-21 ASSESSMENT — PAIN - FUNCTIONAL ASSESSMENT: PAIN_FUNCTIONAL_ASSESSMENT: ACTIVITIES ARE NOT PREVENTED

## 2022-06-21 NOTE — H&P
[facility] History & Physical    Chief Complaint:  Chief Complaint   Patient presents with    Abdominal Pain     rlq pain        SUBJECTIVE:  Mr. Angelo Hoskins is a 42 y.o. male who presents today with abdominal pain. Patient states he started having some constipation later last week. Notes that he has been on a keto diet for the last year and has lost 100 pounds and denies any issues with constipation previously. States he did take some mag citrate at the end of the week and did have multiple bowel movements. On Saturday he began having some right lower quadrant discomfort. This continued throughout the weekend and did slightly worsen. States he was mowing the grass and the bumps were hurting when he would hit them. Denies fevers or chills. Did have some nausea when eating. Denies abdominal surgical history. Did come to the ER yesterday evening for evaluation. CT scan showing findings of acute uncomplicated appendicitis with leukocytosis of 16,000.       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)     Obstructive sleep apnea     AHI:  29.0 per PSG, 2012     Past Surgical History:   Procedure Laterality Date    ABSCESS DRAINAGE       Current Facility-Administered Medications   Medication Dose Route Frequency Provider Last Rate Last Admin    0.9 % sodium chloride infusion  1,000 mL IntraVENous Continuous Rg Saha  mL/hr at 06/21/22 0327 1,000 mL at 06/21/22 0327    sodium chloride flush 0.9 % injection 5-40 mL  5-40 mL IntraVENous 2 times per day Tsering Card, DO        sodium chloride flush 0.9 % injection 5-40 mL  5-40 mL IntraVENous PRN Tsering Card, DO        0.9 % sodium chloride infusion   IntraVENous PRN Tsering Card, DO        enoxaparin Sodium (LOVENOX) injection 30 mg  30 mg SubCUTAneous BID Emmit Spatz, DO        acetaminophen (TYLENOL) tablet 650 mg  650 mg Oral Q4H PRN Emmit Spatz, DO        ondansetron (ZOFRAN-ODT) disintegrating tablet 4 mg  4 mg Oral Q8H PRN Emmit Spatz, DO        Or    [facility]ron [facility]) [facility] 4 mg  4 mg IntraVENous Q6H PRN Emmit Spatz, DO        morphine (PF) injection 2 mg  2 mg IntraVENous Q2H PRN Emmit Spatz, DO   2 mg at 06/21/22 0759    Or    morphine sulfate (PF) injection 4 mg  4 mg IntraVENous Q2H PRN Emmit Spatz, DO        lactated ringers infusion   IntraVENous Continuous Emmit Spatz,  mL/hr at 06/21/22 0655 New Bag at 06/21/22 0655    ampicillin-sulbactam (UNASYN) 3000 mg in 100 mL NS IVPB minibag  3,000 mg IntraVENous Q6H Emmit Spatz, DO         Allergies: Patient has no known allergies. Family History   Problem Relation Age of Onset    Diabetes Mother     Hypertension Mother     Asthma Mother     Coronary Art Dis Father     Heart Attack Father     Hypertension Father     Cancer Maternal Grandmother         kidney    Stroke Paternal Grandfather     Cancer Paternal Grandfather         lung       Social History     Tobacco Use    Smoking status: Never Smoker    Smokeless tobacco: Never Used   Substance Use Topics    Alcohol use: Yes     Comment: twice a year social       Review of Systems   Constitutional: Positive for appetite change. Negative for chills, diaphoresis, fatigue and fever. HENT: Negative for ear discharge, ear pain, facial swelling and nosebleeds. Eyes: Negative for pain, discharge and redness. Respiratory: Negative for cough, chest tightness, shortness of breath and wheezing. Cardiovascular: Negative for chest pain and palpitations. Gastrointestinal: Positive for abdominal pain and nausea. Negative for constipation and diarrhea. Genitourinary: Negative for difficulty urinating, flank pain and hematuria.    Musculoskeletal: Negative for neck pain and neck stiffness. Neurological: Negative for dizziness, numbness and headaches. Psychiatric/Behavioral: Negative for agitation, behavioral problems and self-injury. OBJECTIVE:  /77   Pulse 88   Temp 98.1 °F (36.7 °C) (Temporal)   Resp 19   Ht 5' 9\" (1.753 m)   Wt 280 lb (127 kg)   SpO2 95%   BMI 41.35 kg/m²   CONSTITUTIONAL: Alert, appropriate, no acute distress  SKIN: warm, dry with no rashes or lesions  HEENT: NCAT, Non icteric, PERR. Trachea Midline. CHEST/LUNGS:  Normal respiratory effort. CARDIOVASCULAR:  No edema. ABDOMEN: soft, ND, with mild TTP RLQ  NEUROLOGIC: CN II-XI grossly intact, no motor or sensory deficits   MUSCULOSKELETAL: No clubbing or cyanosis. PSYCHIATRIC:  Normal Mood and Affect. Alert and Oriented. LABS:  CBC:   Recent Labs     06/21/22  0320   WBC 16.1*   HGB 15.4        BMP:    Recent Labs     06/21/22  0320      K 3.9      CO2 27   BUN 14   CREATININE 0.9   GLUCOSE 125*       ASSESSMENT:  Principal Problem:    Acute appendicitis  Active Problems:    Essential (primary) hypertension    Class 3 severe obesity due to excess calories with serious comorbidity and body mass index (BMI) of 40.0 to 44.9 in adult [facility])  Resolved Problems:    * No resolved hospital problems.  *      PLAN:  N.p.o.  Consent  IV antibiotics  Plan for laparoscopic possible open appendectomy today  Risk, benefits and alternatives discussed with the patient and his wife at bedside  All questions answered to their satisfaction    Lacie Jorgensen DO   Electronically Signed on 6/21/2022 at 8:11 AM

## 2022-06-21 NOTE — OP NOTE
Operative Note      Patient: Livia Patel  YOB: 1980  MRN: [id number]    Date of Procedure: 6/21/2022    Pre-Op Diagnosis: Acute appendicitis    Post-Op Diagnosis: Perforated appendicitis       Procedure(s):  DIAGNOSTIC LAPAROSCOPY WITH DRAINAGE OF ABCESS AND DRAIN PLACEMENT    Surgeon(s):  Eugenia Reyes DO    Assistant:   * No surgical staff found *    Anesthesia: General    Estimated Blood Loss (mL): less than 50     Complications: None    Specimens:   * No specimens in log *    Implants:  * No implants in log *      Drains:   Closed/Suction Drain Inferior Abdomen Bulb (Active)   Site Description Clean, dry & intact 06/21/22 1014   Drainage Appearance Bright red 06/21/22 1014   Drain Status To bulb suction 06/21/22 1014       [REMOVED] Urinary Catheter Lucero (Removed)       Findings: Perforated acute appendicitis with phlegmon and abscess. Appendix walled off in the right lower quadrant with dense firm socked in tissue. Detailed Description of Procedure:   Patient was identified in the preoperative holding suite. Consent was confirmed and placed on the chart. Preoperative antibiotics were administered at that time. Next patient was taken the operative suite placed in supine position. Endotracheal anesthesia provided by the department of anesthesia. Next the Lucero catheter was placed. Abdomen prepped and draped in usual sterile fashion. Timeout performed and all were in agreement. Abdomen then entered in the left upper quadrant using a 5 mm Fios trocar. Once the abdomen was entered pneumoperitoneum was achieved to level 15 mmHg.  0 degree scope was switched out for a 30 degree scope. 5 mm trocar placed in left lower quadrant and 12 mm trocar placed in the suprapubic region. Patient was placed in Trendelenburg with left side down. We then turned our attention to the right lower quadrant where the terminal ileum and cecum were densely adhesed to the right pelvic sidewall.   Slow tedious dissection was taken with the use of [place] dissector and suction . Was able to get into an abscess cavity at the base of the cecum. Significant pus was suctioned out of this cavity. Dissection was then taken cephalad along what appeared to be the appendiceal tip. Between the tip and the base of the cecum the tissue was densely adhered and very hard. At that time decision was made to place a percutaneous drain and treat the patient with aggressive antibiotics. A 19 Sami round drain was placed to the left lower quadrant incision and laid over the area of the abscess cavity and cecum. This was tied in using a nylon stitch. Pneumoperitoneum was released and all trochars were removed. Incisions then closed using 4-0 Monocryl in a buried interrupted fashion. Skin was cleaned and dried. Dermabond applied. All needle sponge instrument counts were correct x2. Patient taken the PACU to be recovered per protocol.     Electronically signed by Ric Maldonado DO on 6/21/2022 at 10:34 AM

## 2022-06-21 NOTE — PROGRESS NOTES
Pharmacy Adjustment per [facility] protocol    Svetlana Patrick is a 42 y.o. male. Pharmacy has adjusted medications per [facility] protocol. Recent Labs     06/21/22  0320   BUN 14       Recent Labs     06/21/22  0320   CREATININE 0.9       Estimated Creatinine Clearance: 141 mL/min (based on SCr of 0.9 mg/dL). Height:   Ht Readings from Last 1 Encounters:   06/20/22 5' 9\" (1.753 m)     Weight:  Wt Readings from Last 1 Encounters:   06/20/22 280 lb (127 kg)         Plan: Adjust the following medications based on [facility] protocol:           Change Zosyn 3.375 grams IV Q 6 hours standard infusion to Zosyn 4.5 grams IV x 1 dose over 30 minutes followed by Zosyn 4.5 grams IV Q 8 hours extended infusion.     Electronically signed by Clementina Lynch [facility] on 6/21/2022 at 11:41 AM

## 2022-06-21 NOTE — ANESTHESIA POSTPROCEDURE EVALUATION
Department of Anesthesiology  Postprocedure Note    Patient: Susan Gillette  MRN: [id number]  YOB: 1980  Date of evaluation: 6/21/2022      Procedure Summary     Date: 06/21/22 Room / Location: [facility] OR [address] [place] [address]    Anesthesia Start: Wilfredogopi Contrerass Anesthesia Stop: 1613    Procedure: DIAGNOSTIC LAPAROSCOPY WITH DRAINAGE OF ABCESS AND DRAIN PLACEMENT (N/A Abdomen) Diagnosis:       S/P appy      (S/P appy [Z90.49])    Surgeons: Scott Landry DO Responsible Provider: DONALD Min CRNA    Anesthesia Type: general ASA Status: 2 - Emergent          Anesthesia Type: No value filed. Mary Phase I: Mary Score: 8    Mary Phase II:      Last vitals:   Vitals Value Taken Time   /81 06/21/22 1014   Temp 97.8 °F (36.6 °C) 06/21/22 1015   Pulse 89 06/21/22 1015   Resp 12 06/21/22 1015   SpO2 94 % 06/21/22 1015   Vitals shown include unvalidated device data.       Anesthesia Post Evaluation    Patient location during evaluation: bedside  Patient participation: complete - patient participated  Level of consciousness: awake and alert  Pain score: 0  Airway patency: patent  Nausea & Vomiting: no nausea  Complications: no  Cardiovascular status: hemodynamically stable  Respiratory status: acceptable  Hydration status: euvolemic

## 2022-06-21 NOTE — ED NOTES
Verbal order received from Dr. Soila Villasenor for Dilaudid 1 mg IV.        Joseph Faulkner, LAUREL  06/21/22 1704
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
## 2022-06-21 NOTE — ED PROVIDER NOTES
[facility] EMERGENCY DEPT  eMERGENCY dEPARTMENT eNCOUnter      Pt Name: Keyona Montes De Oca  MRN: [id number]  [place] 1980  Date of evaluation: 6/20/2022  Provider: Saul Hoyt MD    [address] [place] [address]       Chief Complaint   Patient presents with    Abdominal Pain     rlq pain          HISTORY OF PRESENT ILLNESS   (Location/Symptom, Timing/Onset,Context/Setting, Quality, Duration, Modifying Factors, Severity)  Note limiting factors. Keyona Montes De Oca is a 42 y.o. male who presents to the emergency department with right lower quadrant abdominal pain for the last 2 days. It is dull at rest but worsens with car rides or walking. No back pain. He reports nausea but no vomiting. No constipation or diarrhea. No melena or bright red blood per rectum. No dysuria. Meals at 2300 hrs. PMH: Narcolepsy. Hypertension. Obstructive sleep apnea. Hyperlipidemia. PSH: Hidradenitis suppurativa resection. Drug allergies. Social history: No smoke or alcohol  Family history: Noncontributory    HPI    NursingNotes were reviewed. REVIEW OF SYSTEMS    (2-9 systems for level 4, 10 or more for level 5)     Review of Systems   Constitutional: Negative for chills, diaphoresis and fever. HENT: Negative for facial swelling, sinus pain and sore throat. Eyes: Negative for photophobia and pain. Respiratory: Negative for cough, shortness of breath and wheezing. Cardiovascular: Negative for chest pain. Gastrointestinal: Positive for abdominal pain, constipation and nausea. Negative for blood in stool, diarrhea and vomiting. Endocrine: Negative for polydipsia. Genitourinary: Negative for dysuria. Musculoskeletal: Negative for back pain and joint swelling. Skin: Negative for pallor. Allergic/Immunologic: Negative for immunocompromised state. Neurological: Negative for speech difficulty, weakness and light-headedness. Hematological: Does not bruise/bleed easily.    Psychiatric/Behavioral: Negative for agitation and behavioral problems. All other systems reviewed and are negative. 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 No pertinent surgical history. CURRENT MEDICATIONS     Current Discharge Medication List      CONTINUE these medications which have NOT CHANGED    Details   amphetamine-dextroamphetamine (ADDERALL XR) 30 MG extended release capsule Take 1 capsule by mouth 2 times daily for 30 days. Qty: 60 capsule, Refills: 0    Associated Diagnoses: Narcolepsy without cataplexy      !! WAKIX 4.45 MG TABS       !! WAKIX 17.8 MG TABS       Misc. Devices MISC Glucometer  ICD 10: E11.9  Qty: 1 each, Refills: 0    Associated Diagnoses: Type 2 diabetes mellitus without complication, without long-term current use of insulin ([facility])      Lancet Device MISC 1 lancet per strip. Qty: 30 each, Refills: 5    Associated Diagnoses: Type 2 diabetes mellitus without complication, without long-term current use of insulin ([facility])      blood glucose monitor strips 1 strip by Other route daily  Qty: 30 strip, Refills: 11    Associated Diagnoses: Type 2 diabetes mellitus without complication, without long-term current use of insulin ([facility])      doxycycline monohydrate (MONODOX) 100 MG capsule Take 1 capsule by mouth daily  Qty: 30 capsule, Refills: 4    Comments: I just sent this in but I need to change the refills from 2 to 4. Thanks.   Associated Diagnoses: Hydradenitis      metFORMIN (GLUCOPHAGE) 500 MG tablet TAKE 1 TABLET BY MOUTH TWICE A DAY WITH MEALS  Qty: 180 tablet, Refills: 1    Associated Diagnoses: Type 2 diabetes mellitus without complication, without long-term current use of insulin ([facility])      irbesartan (AVAPRO) 300 MG tablet Take 1 tablet by mouth daily  Qty: 90 tablet, Refills: 3      albuterol sulfate  (90 Base) MCG/ACT inhaler TAKE 2 PUFFS BY MOUTH EVERY 6 HOURS AS NEEDED FOR WHEEZE  Qty: 6.7 Inhaler, Refills: 0    Associated Diagnoses: Acute bronchitis, unspecified organism       !! - Potential duplicate medications found. Please discuss with provider. ALLERGIES     Patient has no known allergies. FAMILY HISTORY       Family History   Problem Relation Age of Onset    Diabetes Mother     Hypertension Mother     Asthma Mother     Coronary Art Dis Father     Heart Attack Father     Hypertension Father     Cancer Maternal Grandmother         kidney    Stroke Paternal Grandfather     Cancer Paternal Grandfather         lung          SOCIAL HISTORY       Social History     Socioeconomic History    Marital status:      Spouse name: None    Number of children: None    Years of education: None    Highest education level: None   Occupational History    None   Tobacco Use    Smoking status: Never Smoker    Smokeless tobacco: Never Used   Vaping Use    Vaping Use: Never used   Substance and Sexual Activity    Alcohol use: Yes     Comment: twice a year social    Drug use: No    Sexual activity: None   Other Topics Concern    None   Social History Narrative    None     Social Determinants of Health     Financial Resource Strain:     Difficulty of Paying Living Expenses: Not on file   Food Insecurity:     Worried About Running Out of Food in the Last Year: Not on file    Aman of Food in the Last Year: Not on file   Transportation Needs:     Lack of Transportation (Medical): Not on file    Lack of Transportation (Non-Medical):  Not on file   Physical Activity:     Days of Exercise per Week: Not on file    Minutes of Exercise per Session: Not on file   Stress:     Feeling of Stress : Not on file   Social Connections:     Frequency of Communication with Friends and Family: Not on file  Frequency of Social Gatherings with Friends and Family: Not on file    Attends Latter-day Services: Not on file    Active Member of Clubs or Organizations: Not on file    Attends Club or Organization Meetings: Not on file    Marital Status: Not on file   Intimate Partner Violence:     Fear of Current or Ex-Partner: Not on file    Emotionally Abused: Not on file    Physically Abused: Not on file    Sexually Abused: Not on file   Housing Stability:     Unable to Pay for Housing in the Last Year: Not on file    Number of [place] in the Last Year: Not on file    Unstable Housing in the Last Year: Not on file       SCREENINGS    Brooke Coma Scale  Eye Opening: Spontaneous  Best Verbal Response: Oriented  Best Motor Response: Obeys commands  Brooke Coma Scale Score: 15        PHYSICAL EXAM    (up to 7 for level 4, 8 or more for level 5)     ED Triage Vitals [06/20/22 2353]   BP Temp Temp Source Heart Rate Resp SpO2 Height Weight   134/80 97.7 °F (36.5 °C) Infrared (!) 105 16 95 % 5' 9\" (1.753 m) 280 lb (127 kg)       Physical Exam  Vitals reviewed. Constitutional:       General: He is awake. Appearance: Normal appearance. He is well-developed and well-groomed. He is not ill-appearing, toxic-appearing or diaphoretic. HENT:      Head:      Comments: Normocephalic/atraumatic. No jaundice. No anisocoria. No subconjunctival hemorrhage. No epistaxis. No stridor or angioedema  Neck:      Comments: Midline trachea. No nuchal rigidity. No meningismus. No bony tenderness to palpation  Cardiovascular:      Comments: Tachycardic rate and regular rhythm at a rate of 105. No murmur, gallops, or rubs  Pulmonary:      Comments: Clear to auscultation bilaterally. Symmetric breath sounds. Good air movement. No wheezing, rhonchi, or rales  Abdominal:      Comments: Soft, nondistended. Guarding. Positive Rovsing's. Right lower quadrant abdominal tenderness to palpation. Negative Bolanos's. Genitourinary:     Comments: Deferred  Musculoskeletal:      Comments: No well localized spinal percussion tenderness. No CVA tenderness. No extremity fracture or dislocation. No compartment syndrome. No acute limb ischemia. No septic joint   Lymphadenopathy:      Comments: deferred   Skin:     Comments: Warm and dry. No cellulitis or petechial rash noted. Neurological:      Mental Status: He is alert. Comments: CN II-VII intact. Alert and oriented x 4. Nonfocal   Psychiatric:         Behavior: Behavior is cooperative. Comments: Cooperative and appropriate         DIAGNOSTIC RESULTS     EKG: All EKG's areinterpreted by the Emergency Department Physician who either signs or Co-signs this chart in the absence of a cardiologist.    Sinus rhythm at a rate of 88. Normal intervals. Normal axis. Old septal MI. Normal QRS segments otherwise. Normal ST and T segments. No STEMI. No [id number]        CT ABDOMEN PELVIS W IV CONTRAST Additional Contrast? None   Final Result   1. Features are in keeping with acute uncomplicated appendicitis    2. Calcified splenic granulomas. 3. Bosniak class I renal cysts on the right side. Recommendation: Follow up as clinically indicated. All CT scans at this facility utilize dose modulation, iterative reconstruction, and/or weight based dosing when appropriate to reduce radiation dose to as low as reasonably achievable.    Amended by Arcadio Malone DO at 21-Jun-2022 05:58:32 AM   Electronically Signed by Arcadio Malone DO at 21-Jun-2022 05:45:10 AM                       LABS:  Labs Reviewed   CBC WITH AUTO DIFFERENTIAL - Abnormal; Notable for the following components:       Result Value    WBC 16.1 (*)     MCV 95.7 (*)     MCHC 31.2 (*)     Neutrophils % 69.3 (*)     Lymphocytes % 19.7 (*)     Neutrophils Absolute 11.2 (*)     Monocytes Absolute 1.50 (*)     All other components within normal limits   COMPREHENSIVE METABOLIC PANEL - Abnormal; Notable for the following components:    Glucose 125 (*)     All other components within normal limits   URINALYSIS WITH REFLEX TO CULTURE - Abnormal; Notable for the following components:    Color, UA DARK YELLOW (*)     Clarity, UA CLOUDY (*)     Ketones, Urine TRACE (*)     Protein, UA TRACE (*)     All other components within normal limits   SEDIMENTATION RATE - Abnormal; Notable for the following components:    Sed Rate 21 (*)     All other components within normal limits   C-REACTIVE PROTEIN - Abnormal; Notable for the following components:    CRP 11.20 (*)     All other components within normal limits   LIPASE       All other labs were within normal range or not returned as of this dictation. EMERGENCY DEPARTMENT COURSE and DIFFERENTIAL DIAGNOSIS/MDM:   Vitals:    Vitals:    06/20/22 2353 06/21/22 0336 06/21/22 0338 06/21/22 0425   BP: 134/80  131/81 125/66   Pulse: (!) 105 94 94 84   Resp: 16  17 14   Temp: 97.7 °F (36.5 °C)      TempSrc: Infrared      SpO2: 95%  96% 96%   Weight: 280 lb (127 kg)      Height: 5' 9\" (1.753 m)          MDM    Reassessment  Patient feels better after the ER interventions. He was given 3 g of Unasyn. He will be admitted to Dr. Tae Leal service for further care and evaluation to include any further workup as clinically indicated. CONSULTS:  None    PROCEDURES:  Unless otherwise noted below, none     Procedures    FINAL IMPRESSION      1. Acute appendicitis with generalized peritonitis, without abscess, unspecified whether gangrene present, unspecified whether perforation present    2. Dehydration    3. Elevated C-reactive protein (CRP)    4. Elevated sed rate          DISPOSITION/PLAN   DISPOSITION Decision To Admit 06/21/2022 05:06:43 AM      PATIENT REFERRED TO:  No follow-up provider specified.     DISCHARGE MEDICATIONS:  Current Discharge Medication List             (Please note that portions of this note were completed with a voice recognition program.  Efforts were made to edit thedictations but occasionally words are mis-transcribed.)    Marcos Cruz MD (electronically signed)  Attending Emergency Physician          Marcos Cruz MD  06/21/22 8133       Marcos Cruz MD  06/21/22 4544

## 2022-06-21 NOTE — PROGRESS NOTES
4 Eyes Skin Assessment    Bret Macias is being assessed upon: Admission    I agree that I, Moses Adams RN, along with Mireya Rodriguez RN (either 2 RN's or 1 LPN and 1 RN) have performed a thorough Head to Toe Skin Assessment on the patient. ALL assessment sites listed below have been assessed. Areas assessed by both nurses:     [x]   Head, Face, and Ears   [x]   Shoulders, Back, and Chest  [x]   Arms, Elbows, and Hands   [x]   Coccyx, Sacrum, and Ischium  [x]   Legs, Feet, and Heels    Does the Patient have Skin Breakdown?  No    Sunny Prevention initiated: No  Wound Care Orders initiated: No    WOC nurse consulted for Pressure Injury (Stage 3,4, Unstageable, DTI, NWPT, and Complex wounds) and New or Established Ostomies: No        Primary Nurse eSignature: Moses Adams RN on 6/21/2022 at 4:21 PM      Co-Signer eSignature:  Electronically signed by Maggie Connors RN on 6/21/2022 at 5:28 PM

## 2022-06-22 LAB
ANION GAP SERPL CALCULATED.3IONS-SCNC: 7 MMOL/L (ref 7–19)
BASOPHILS ABSOLUTE: 0 K/UL (ref 0–0.2)
BASOPHILS RELATIVE PERCENT: 0.1 % (ref 0–1)
BUN BLDV-MCNC: 11 MG/DL (ref 6–20)
CALCIUM SERPL-MCNC: 8.3 MG/DL (ref 8.6–10)
CHLORIDE BLD-SCNC: 105 MMOL/L (ref 98–111)
CO2: 25 MMOL/L (ref 22–29)
CREAT SERPL-MCNC: 0.8 MG/DL (ref 0.5–1.2)
EOSINOPHILS ABSOLUTE: 0 K/UL (ref 0–0.6)
EOSINOPHILS RELATIVE PERCENT: 0 % (ref 0–5)
GFR AFRICAN AMERICAN: >59
GFR NON-AFRICAN AMERICAN: >60
GLUCOSE BLD-MCNC: 105 MG/DL (ref 70–99)
GLUCOSE BLD-MCNC: 113 MG/DL (ref 70–99)
GLUCOSE BLD-MCNC: 120 MG/DL (ref 70–99)
GLUCOSE BLD-MCNC: 121 MG/DL (ref 74–109)
GLUCOSE BLD-MCNC: 133 MG/DL (ref 70–99)
HCT VFR BLD CALC: 41.4 % (ref 42–52)
HEMOGLOBIN: 13 G/DL (ref 14–18)
IMMATURE GRANULOCYTES #: 0.1 K/UL
LYMPHOCYTES ABSOLUTE: 2.7 K/UL (ref 1.1–4.5)
LYMPHOCYTES RELATIVE PERCENT: 12.7 % (ref 20–40)
MCH RBC QN AUTO: 29.9 PG (ref 27–31)
MCHC RBC AUTO-ENTMCNC: 31.4 G/DL (ref 33–37)
MCV RBC AUTO: 95.2 FL (ref 80–94)
MONOCYTES ABSOLUTE: 1.9 K/UL (ref 0–0.9)
MONOCYTES RELATIVE PERCENT: 9.2 % (ref 0–10)
NEUTROPHILS ABSOLUTE: 16.2 K/UL (ref 1.5–7.5)
NEUTROPHILS RELATIVE PERCENT: 77.3 % (ref 50–65)
PDW BLD-RTO: 14 % (ref 11.5–14.5)
PERFORMED ON: ABNORMAL
PLATELET # BLD: 281 K/UL (ref 130–400)
PMV BLD AUTO: 10.5 FL (ref 9.4–12.4)
POTASSIUM REFLEX MAGNESIUM: 4.6 MMOL/L (ref 3.5–5)
RBC # BLD: 4.35 M/UL (ref 4.7–6.1)
SODIUM BLD-SCNC: 137 MMOL/L (ref 136–145)
WBC # BLD: 21 K/UL (ref 4.8–10.8)

## 2022-06-22 PROCEDURE — 36415 COLL VENOUS BLD VENIPUNCTURE: CPT

## 2022-06-22 PROCEDURE — 96365 THER/PROPH/DIAG IV INF INIT: CPT

## 2022-06-22 PROCEDURE — 96376 TX/PRO/DX INJ SAME DRUG ADON: CPT

## 2022-06-22 PROCEDURE — 85025 COMPLETE CBC W/AUTO DIFF WBC: CPT

## 2022-06-22 PROCEDURE — 2580000003 HC RX 258: Performed by: SURGERY

## 2022-06-22 PROCEDURE — 82947 ASSAY GLUCOSE BLOOD QUANT: CPT

## 2022-06-22 PROCEDURE — 80048 BASIC METABOLIC PNL TOTAL CA: CPT

## 2022-06-22 PROCEDURE — G0378 HOSPITAL OBSERVATION PER HR: HCPCS

## 2022-06-22 PROCEDURE — 6360000002 HC RX W HCPCS: Performed by: SURGERY

## 2022-06-22 PROCEDURE — 96366 THER/PROPH/DIAG IV INF ADDON: CPT

## 2022-06-22 PROCEDURE — 6370000000 HC RX 637 (ALT 250 FOR IP): Performed by: SURGERY

## 2022-06-22 PROCEDURE — 96375 TX/PRO/DX INJ NEW DRUG ADDON: CPT

## 2022-06-22 PROCEDURE — 99024 POSTOP FOLLOW-UP VISIT: CPT | Performed by: SURGERY

## 2022-06-22 RX ADMIN — OXYCODONE 10 MG: 5 TABLET ORAL at 12:17

## 2022-06-22 RX ADMIN — SODIUM CHLORIDE, PRESERVATIVE FREE 10 ML: 5 INJECTION INTRAVENOUS at 08:00

## 2022-06-22 RX ADMIN — DOCUSATE SODIUM 100 MG: 100 CAPSULE, LIQUID FILLED ORAL at 07:58

## 2022-06-22 RX ADMIN — MORPHINE SULFATE 4 MG: 4 INJECTION, SOLUTION INTRAMUSCULAR; INTRAVENOUS at 15:31

## 2022-06-22 RX ADMIN — OXYCODONE 10 MG: 5 TABLET ORAL at 21:29

## 2022-06-22 RX ADMIN — PIPERACILLIN SODIUM AND TAZOBACTAM SODIUM 4500 MG: 4; .5 INJECTION, POWDER, LYOPHILIZED, FOR SOLUTION INTRAVENOUS at 10:11

## 2022-06-22 RX ADMIN — OXYCODONE 10 MG: 5 TABLET ORAL at 17:30

## 2022-06-22 RX ADMIN — SODIUM CHLORIDE: 9 INJECTION, SOLUTION INTRAVENOUS at 17:27

## 2022-06-22 RX ADMIN — ENOXAPARIN SODIUM 40 MG: 100 INJECTION SUBCUTANEOUS at 07:58

## 2022-06-22 RX ADMIN — DOCUSATE SODIUM 100 MG: 100 CAPSULE, LIQUID FILLED ORAL at 21:30

## 2022-06-22 RX ADMIN — SODIUM CHLORIDE, PRESERVATIVE FREE 10 ML: 5 INJECTION INTRAVENOUS at 21:30

## 2022-06-22 RX ADMIN — PIPERACILLIN SODIUM AND TAZOBACTAM SODIUM 4500 MG: 4; .5 INJECTION, POWDER, LYOPHILIZED, FOR SOLUTION INTRAVENOUS at 17:23

## 2022-06-22 RX ADMIN — MORPHINE SULFATE 4 MG: 4 INJECTION, SOLUTION INTRAMUSCULAR; INTRAVENOUS at 22:33

## 2022-06-22 RX ADMIN — DEXTROAMPHETAMINE SACCHARATE, AMPHETAMINE ASPARTATE MONOHYDRATE, DEXTROAMPHETAMINE SULFATE, AND AMPHETAMINE SULFATE 30 MG: 2.5; 2.5; 2.5; 2.5 CAPSULE, EXTENDED RELEASE ORAL at 07:58

## 2022-06-22 RX ADMIN — OXYCODONE 10 MG: 5 TABLET ORAL at 02:24

## 2022-06-22 RX ADMIN — PIPERACILLIN SODIUM AND TAZOBACTAM SODIUM 4500 MG: 4; .5 INJECTION, POWDER, LYOPHILIZED, FOR SOLUTION INTRAVENOUS at 02:25

## 2022-06-22 RX ADMIN — OXYCODONE 10 MG: 5 TABLET ORAL at 07:59

## 2022-06-22 ASSESSMENT — PAIN DESCRIPTION - DESCRIPTORS
DESCRIPTORS: ACHING
DESCRIPTORS: ACHING;DISCOMFORT
DESCRIPTORS: ACHING
DESCRIPTORS: CRAMPING;ACHING
DESCRIPTORS: ACHING;CRAMPING
DESCRIPTORS: ACHING;STABBING
DESCRIPTORS: ACHING
DESCRIPTORS: ACHING
DESCRIPTORS: STABBING
DESCRIPTORS: ACHING

## 2022-06-22 ASSESSMENT — PAIN DESCRIPTION - ONSET
ONSET: ON-GOING

## 2022-06-22 ASSESSMENT — PAIN DESCRIPTION - PAIN TYPE
TYPE: SURGICAL PAIN

## 2022-06-22 ASSESSMENT — PAIN DESCRIPTION - LOCATION
LOCATION: ABDOMEN
LOCATION: ABDOMEN;INCISION
LOCATION: ABDOMEN
LOCATION: ABDOMEN;INCISION

## 2022-06-22 ASSESSMENT — PAIN SCALES - GENERAL
PAINLEVEL_OUTOF10: 7
PAINLEVEL_OUTOF10: 6
PAINLEVEL_OUTOF10: 8
PAINLEVEL_OUTOF10: 9
PAINLEVEL_OUTOF10: 7
PAINLEVEL_OUTOF10: 8
PAINLEVEL_OUTOF10: 8
PAINLEVEL_OUTOF10: 7
PAINLEVEL_OUTOF10: 8
PAINLEVEL_OUTOF10: 6
PAINLEVEL_OUTOF10: 6

## 2022-06-22 ASSESSMENT — PAIN - FUNCTIONAL ASSESSMENT

## 2022-06-22 ASSESSMENT — PAIN DESCRIPTION - ORIENTATION
ORIENTATION: RIGHT
ORIENTATION: RIGHT
ORIENTATION: RIGHT;LOWER
ORIENTATION: LEFT;RIGHT
ORIENTATION: RIGHT
ORIENTATION: RIGHT;LEFT

## 2022-06-22 ASSESSMENT — PAIN DESCRIPTION - FREQUENCY
FREQUENCY: CONTINUOUS

## 2022-06-22 NOTE — PLAN OF CARE
1000 Pt progressing post op. Diet increased per orders. Active in room. Pain well controlled at present. 1800 Pt remains painful, using po and IV meds. Ate 100% of his full liq tray at dinner. Ambulated in gonzalez x1 this shift.

## 2022-06-22 NOTE — PROGRESS NOTES
[id number] [facility] Surgery Progress Note    Chief Complaint:   Chief Complaint   Patient presents with    Abdominal Pain     rlq pain        SUBJECTIVE:  Mr. Angeles Magana is a 42 y.o. male is seen and examined at bedside. Pain controlled. Denies BM. Passing flatus. Afebrile. He denies fever, chills, chest pain, SOB, n/v/d/c.      OBJECTIVE:  /81   Pulse 66   Temp 97.7 °F (36.5 °C) (Temporal)   Resp 16   Ht 5' 9\" (1.753 m)   Wt 280 lb (127 kg)   SpO2 97%   BMI 41.35 kg/m²   CONSTITUTIONAL: Alert, appropriate, no acute distress  SKIN: warm, dry with no rashes or lesions  HEENT: NCAT, Non icteric, PERR. Trachea Midline. CHEST/LUNGS: CTA bilaterally. Normal respiratory effort. CARDIOVASCULAR: RRR, No edema. ABDOMEN: soft, ND, appropriately TTP, +BS. [place] in place w SS OP. Incisions: Clean, dry, and intact with no erythema or induration  NEUROLOGIC: CN II-XI grossly intact, no motor or sensory deficits   MUSCULOSKELETAL: No clubbing or cyanosis. PSYCHIATRIC:  Normal Mood and Affect. Alert and Oriented. Labs:  CBC:   Recent Labs     06/21/22  0320 06/22/22  0414   WBC 16.1* 21.0*   HGB 15.4 13.0*    281     BMP:    Recent Labs     06/21/22  0320 06/22/22  0414    137   K 3.9 4.6    105   CO2 27 25   BUN 14 11   CREATININE 0.9 0.8   GLUCOSE 125* 121*       ASSESSMENT:  Principal Problem:    Acute appendicitis  Active Problems:    Essential (primary) hypertension    Class 3 severe obesity due to excess calories with serious comorbidity and body mass index (BMI) of 40.0 to 44.9 in adult [facility])  Resolved Problems:    * No resolved hospital problems.  *      PLAN:  Advance to FLD  Leukocytosis noted: Likely reactive   Cont Zosyn for perforated viscous  Plan for 14 days Abx  Cont to monitor drain OP  IVF  Pain/Nausea control  DVT proph  Ambulate/IS    Rahul Cardenas DO   Electronically Signed on 6/22/2022 at 9:11 AM

## 2022-06-23 PROBLEM — K35.33 ACUTE APPENDICITIS WITH PERFORATION AND LOCALIZED PERITONITIS, WITH ABSCESS: Status: ACTIVE | Noted: 2022-06-23

## 2022-06-23 LAB
ANION GAP SERPL CALCULATED.3IONS-SCNC: 8 MMOL/L (ref 7–19)
BASOPHILS ABSOLUTE: 0.1 K/UL (ref 0–0.2)
BASOPHILS RELATIVE PERCENT: 0.6 % (ref 0–1)
BUN BLDV-MCNC: 10 MG/DL (ref 6–20)
CALCIUM SERPL-MCNC: 8.5 MG/DL (ref 8.6–10)
CHLORIDE BLD-SCNC: 105 MMOL/L (ref 98–111)
CO2: 29 MMOL/L (ref 22–29)
CREAT SERPL-MCNC: 0.8 MG/DL (ref 0.5–1.2)
EOSINOPHILS ABSOLUTE: 0.1 K/UL (ref 0–0.6)
EOSINOPHILS RELATIVE PERCENT: 1 % (ref 0–5)
GFR AFRICAN AMERICAN: >59
GFR NON-AFRICAN AMERICAN: >60
GLUCOSE BLD-MCNC: 100 MG/DL (ref 74–109)
GLUCOSE BLD-MCNC: 103 MG/DL (ref 70–99)
GLUCOSE BLD-MCNC: 112 MG/DL (ref 70–99)
GLUCOSE BLD-MCNC: 116 MG/DL (ref 70–99)
GLUCOSE BLD-MCNC: 81 MG/DL (ref 70–99)
HCT VFR BLD CALC: 43 % (ref 42–52)
HEMOGLOBIN: 13.8 G/DL (ref 14–18)
IMMATURE GRANULOCYTES #: 0.1 K/UL
LYMPHOCYTES ABSOLUTE: 4.8 K/UL (ref 1.1–4.5)
LYMPHOCYTES RELATIVE PERCENT: 38.2 % (ref 20–40)
MCH RBC QN AUTO: 30.7 PG (ref 27–31)
MCHC RBC AUTO-ENTMCNC: 32.1 G/DL (ref 33–37)
MCV RBC AUTO: 95.6 FL (ref 80–94)
MONOCYTES ABSOLUTE: 1.1 K/UL (ref 0–0.9)
MONOCYTES RELATIVE PERCENT: 8.6 % (ref 0–10)
NEUTROPHILS ABSOLUTE: 6.3 K/UL (ref 1.5–7.5)
NEUTROPHILS RELATIVE PERCENT: 50.9 % (ref 50–65)
PDW BLD-RTO: 14.1 % (ref 11.5–14.5)
PERFORMED ON: ABNORMAL
PERFORMED ON: NORMAL
PLATELET # BLD: 321 K/UL (ref 130–400)
PMV BLD AUTO: 10.8 FL (ref 9.4–12.4)
POTASSIUM SERPL-SCNC: 4.8 MMOL/L (ref 3.5–5)
RBC # BLD: 4.5 M/UL (ref 4.7–6.1)
SODIUM BLD-SCNC: 142 MMOL/L (ref 136–145)
WBC # BLD: 12.4 K/UL (ref 4.8–10.8)

## 2022-06-23 PROCEDURE — 6360000002 HC RX W HCPCS: Performed by: SURGERY

## 2022-06-23 PROCEDURE — G0378 HOSPITAL OBSERVATION PER HR: HCPCS

## 2022-06-23 PROCEDURE — 6370000000 HC RX 637 (ALT 250 FOR IP): Performed by: SURGERY

## 2022-06-23 PROCEDURE — 96376 TX/PRO/DX INJ SAME DRUG ADON: CPT

## 2022-06-23 PROCEDURE — 80048 BASIC METABOLIC PNL TOTAL CA: CPT

## 2022-06-23 PROCEDURE — 1210000000 HC MED SURG R&B

## 2022-06-23 PROCEDURE — 85025 COMPLETE CBC W/AUTO DIFF WBC: CPT

## 2022-06-23 PROCEDURE — 99024 POSTOP FOLLOW-UP VISIT: CPT | Performed by: SURGERY

## 2022-06-23 PROCEDURE — 2580000003 HC RX 258: Performed by: SURGERY

## 2022-06-23 PROCEDURE — 82947 ASSAY GLUCOSE BLOOD QUANT: CPT

## 2022-06-23 PROCEDURE — 36415 COLL VENOUS BLD VENIPUNCTURE: CPT

## 2022-06-23 PROCEDURE — 96366 THER/PROPH/DIAG IV INF ADDON: CPT

## 2022-06-23 RX ORDER — LOSARTAN POTASSIUM 100 MG/1
100 TABLET ORAL DAILY
Refills: 3 | Status: DISCONTINUED | OUTPATIENT
Start: 2022-06-23 | End: 2022-06-24 | Stop reason: HOSPADM

## 2022-06-23 RX ORDER — OXYCODONE AND ACETAMINOPHEN 7.5; 325 MG/1; MG/1
1 TABLET ORAL EVERY 4 HOURS PRN
Status: DISCONTINUED | OUTPATIENT
Start: 2022-06-23 | End: 2022-06-24 | Stop reason: HOSPADM

## 2022-06-23 RX ORDER — ENOXAPARIN SODIUM 100 MG/ML
30 INJECTION SUBCUTANEOUS 2 TIMES DAILY
Status: DISCONTINUED | OUTPATIENT
Start: 2022-06-23 | End: 2022-06-24 | Stop reason: HOSPADM

## 2022-06-23 RX ORDER — POLYETHYLENE GLYCOL 3350 17 G/17G
17 POWDER, FOR SOLUTION ORAL 2 TIMES DAILY
Status: DISCONTINUED | OUTPATIENT
Start: 2022-06-23 | End: 2022-06-24 | Stop reason: HOSPADM

## 2022-06-23 RX ORDER — DEXTROAMPHETAMINE SACCHARATE, AMPHETAMINE ASPARTATE MONOHYDRATE, DEXTROAMPHETAMINE SULFATE AND AMPHETAMINE SULFATE 2.5; 2.5; 2.5; 2.5 MG/1; MG/1; MG/1; MG/1
30 CAPSULE, EXTENDED RELEASE ORAL ONCE
Status: COMPLETED | OUTPATIENT
Start: 2022-06-23 | End: 2022-06-23

## 2022-06-23 RX ORDER — DIPHENHYDRAMINE HCL 25 MG
25 TABLET ORAL EVERY 6 HOURS PRN
Status: DISCONTINUED | OUTPATIENT
Start: 2022-06-23 | End: 2022-06-24 | Stop reason: HOSPADM

## 2022-06-23 RX ADMIN — LOSARTAN POTASSIUM 100 MG: 100 TABLET, FILM COATED ORAL at 09:15

## 2022-06-23 RX ADMIN — SODIUM CHLORIDE: 9 INJECTION, SOLUTION INTRAVENOUS at 09:02

## 2022-06-23 RX ADMIN — MORPHINE SULFATE 4 MG: 4 INJECTION, SOLUTION INTRAMUSCULAR; INTRAVENOUS at 21:47

## 2022-06-23 RX ADMIN — OXYCODONE 10 MG: 5 TABLET ORAL at 06:35

## 2022-06-23 RX ADMIN — ENOXAPARIN SODIUM 30 MG: 100 INJECTION SUBCUTANEOUS at 21:53

## 2022-06-23 RX ADMIN — MORPHINE SULFATE 4 MG: 4 INJECTION, SOLUTION INTRAMUSCULAR; INTRAVENOUS at 09:04

## 2022-06-23 RX ADMIN — MORPHINE SULFATE 4 MG: 4 INJECTION, SOLUTION INTRAMUSCULAR; INTRAVENOUS at 12:29

## 2022-06-23 RX ADMIN — PIPERACILLIN SODIUM AND TAZOBACTAM SODIUM 4500 MG: 4; .5 INJECTION, POWDER, LYOPHILIZED, FOR SOLUTION INTRAVENOUS at 09:16

## 2022-06-23 RX ADMIN — POLYETHYLENE GLYCOL 3350 17 G: 17 POWDER, FOR SOLUTION ORAL at 21:48

## 2022-06-23 RX ADMIN — ENOXAPARIN SODIUM 40 MG: 100 INJECTION SUBCUTANEOUS at 09:08

## 2022-06-23 RX ADMIN — MORPHINE SULFATE 4 MG: 4 INJECTION, SOLUTION INTRAMUSCULAR; INTRAVENOUS at 04:08

## 2022-06-23 RX ADMIN — PIPERACILLIN SODIUM AND TAZOBACTAM SODIUM 4500 MG: 4; .5 INJECTION, POWDER, LYOPHILIZED, FOR SOLUTION INTRAVENOUS at 17:36

## 2022-06-23 RX ADMIN — OXYCODONE HYDROCHLORIDE AND ACETAMINOPHEN 1 TABLET: 7.5; 325 TABLET ORAL at 14:34

## 2022-06-23 RX ADMIN — PIPERACILLIN SODIUM AND TAZOBACTAM SODIUM 4500 MG: 4; .5 INJECTION, POWDER, LYOPHILIZED, FOR SOLUTION INTRAVENOUS at 02:02

## 2022-06-23 RX ADMIN — MORPHINE SULFATE 4 MG: 4 INJECTION, SOLUTION INTRAMUSCULAR; INTRAVENOUS at 16:07

## 2022-06-23 RX ADMIN — DOCUSATE SODIUM 100 MG: 100 CAPSULE, LIQUID FILLED ORAL at 09:03

## 2022-06-23 RX ADMIN — DIPHENHYDRAMINE HYDROCHLORIDE 25 MG: 25 TABLET ORAL at 09:15

## 2022-06-23 RX ADMIN — OXYCODONE 10 MG: 5 TABLET ORAL at 02:01

## 2022-06-23 RX ADMIN — DEXTROAMPHETAMINE SACCHARATE, AMPHETAMINE ASPARTATE MONOHYDRATE, DEXTROAMPHETAMINE SULFATE, AND AMPHETAMINE SULFATE 30 MG: 2.5; 2.5; 2.5; 2.5 CAPSULE, EXTENDED RELEASE ORAL at 09:03

## 2022-06-23 RX ADMIN — SODIUM CHLORIDE, PRESERVATIVE FREE 10 ML: 5 INJECTION INTRAVENOUS at 21:48

## 2022-06-23 RX ADMIN — OXYCODONE HYDROCHLORIDE AND ACETAMINOPHEN 1 TABLET: 7.5; 325 TABLET ORAL at 23:16

## 2022-06-23 RX ADMIN — DOCUSATE SODIUM 100 MG: 100 CAPSULE, LIQUID FILLED ORAL at 21:48

## 2022-06-23 RX ADMIN — DEXTROAMPHETAMINE SACCHARATE, AMPHETAMINE ASPARTATE, DEXTROAMPHETAMINE SULFATE AND AMPHETAMINE SULFATE 30 MG: 2.5; 2.5; 2.5; 2.5 CAPSULE, EXTENDED RELEASE ORAL at 14:34

## 2022-06-23 ASSESSMENT — PAIN DESCRIPTION - ORIENTATION
ORIENTATION: RIGHT
ORIENTATION: MID
ORIENTATION: RIGHT
ORIENTATION: RIGHT
ORIENTATION: MID
ORIENTATION: RIGHT
ORIENTATION: RIGHT

## 2022-06-23 ASSESSMENT — PAIN DESCRIPTION - PAIN TYPE
TYPE: SURGICAL PAIN

## 2022-06-23 ASSESSMENT — PAIN DESCRIPTION - ONSET
ONSET: ON-GOING

## 2022-06-23 ASSESSMENT — PAIN DESCRIPTION - DESCRIPTORS
DESCRIPTORS: DISCOMFORT;SHOOTING
DESCRIPTORS: DISCOMFORT;CRAMPING
DESCRIPTORS: ACHING
DESCRIPTORS: DISCOMFORT;TENDER
DESCRIPTORS: ACHING
DESCRIPTORS: DISCOMFORT
DESCRIPTORS: DISCOMFORT;SHOOTING
DESCRIPTORS: ACHING

## 2022-06-23 ASSESSMENT — PAIN - FUNCTIONAL ASSESSMENT
PAIN_FUNCTIONAL_ASSESSMENT: PREVENTS OR INTERFERES SOME ACTIVE ACTIVITIES AND ADLS
PAIN_FUNCTIONAL_ASSESSMENT: ACTIVITIES ARE NOT PREVENTED
PAIN_FUNCTIONAL_ASSESSMENT: ACTIVITIES ARE NOT PREVENTED

## 2022-06-23 ASSESSMENT — PAIN DESCRIPTION - FREQUENCY
FREQUENCY: CONTINUOUS

## 2022-06-23 ASSESSMENT — PAIN DESCRIPTION - LOCATION
LOCATION: ABDOMEN

## 2022-06-23 ASSESSMENT — PAIN SCALES - GENERAL
PAINLEVEL_OUTOF10: 7
PAINLEVEL_OUTOF10: 6
PAINLEVEL_OUTOF10: 7
PAINLEVEL_OUTOF10: 1
PAINLEVEL_OUTOF10: 6
PAINLEVEL_OUTOF10: 7

## 2022-06-23 NOTE — PROGRESS NOTES
Physician Progress Note      PATIENT:               Martin Aguirre  CSN #:                  [id number]  :                       1980  ADMIT DATE:       2022 2:48 AM  DISCH DATE:  RESPONDING  PROVIDER #:        Jim Downs          QUERY TEXT:    Pt admitted with acute appendicitis with perforation and localized   peritonitis. Pt noted to have SIRS. If possible, please document in the   progress notes and discharge summary if you are evaluating and /or treating   any of the following: The medical record reflects the following:  Risk Factors: acute appendicitis with perforation and local peritonitis  Clinical Indicators: WBC 16.1 w/a repeat of 21.0, sed rate 21.0, CRP 11.20, HR   , per OP note \"perforated acute appendicitis with phlegmon and abscess\"  Treatment: Diagnostic lap with drainage of abscess and drain placement; Zosyn   IV    Thank you,  José BARRAGAN, CCDS  [phone]  Options provided:  -- Sepsis, present on admission  -- Sepsis, present on admission, now resolved  -- Acute appendicitis with perforation and local peritonitis without sepsis  -- Other - I will add my own diagnosis  -- Disagree - Not applicable / Not valid  -- Disagree - Clinically unable to determine / Unknown  -- Refer to Clinical Documentation Reviewer    PROVIDER RESPONSE TEXT:    This patient was treated for sepsis this admission, which was present on   admission and is currently resolved.     Query created by: Jeferson Mae on 2022 2:37 PM      Electronically signed by:  Jim Downs 2022 2:41 PM

## 2022-06-23 NOTE — PROGRESS NOTES
[facility] General Surgery Progress Note    Chief Complaint:   Chief Complaint   Patient presents with    Abdominal Pain     rlq pain        SUBJECTIVE:  Mr. Michaels Person is a 42 y.o. male is seen and examined at bedside. Passing flatus. Denies BM. Continues to improve. Tolerated full liquid diet. He denies fever, chills, chest pain, SOB, n/v/d/c.      OBJECTIVE:  BP (!) 141/93   Pulse 75   Temp (!) 96.4 °F (35.8 °C) (Temporal)   Resp 18   Ht 5' 9\" (1.753 m)   Wt 280 lb (127 kg)   SpO2 99%   BMI 41.35 kg/m²   CONSTITUTIONAL: Alert, appropriate, no acute distress  SKIN: warm, dry with no rashes or lesions  HEENT: NCAT, Non icteric, PERR. Trachea Midline. CHEST/LUNGS: CTA bilaterally. Normal respiratory effort. CARDIOVASCULAR: RRR, No edema. ABDOMEN: soft, ND, appropriately TTP, +BS. [place] in place w SS OP. Incisions: Clean, dry, and intact with no erythema or induration  NEUROLOGIC: CN II-XI grossly intact, no motor or sensory deficits   MUSCULOSKELETAL: No clubbing or cyanosis. PSYCHIATRIC:  Normal Mood and Affect. Alert and Oriented. Labs:  CBC:   Recent Labs     06/21/22  0320 06/22/22  0414 06/23/22  0756   WBC 16.1* 21.0* 12.4*   HGB 15.4 13.0* 13.8*    281 321     BMP:    Recent Labs     06/21/22  0320 06/22/22  0414 06/23/22  0756    137 142   K 3.9 4.6 4.8    105 105   CO2 27 25 29   BUN 14 11 10   CREATININE 0.9 0.8 0.8   GLUCOSE 125* 121* 100       ASSESSMENT:  Principal Problem:    Acute appendicitis  Active Problems:    Acute appendicitis with perforation and localized peritonitis, with abscess    Essential (primary) hypertension    Class 3 severe obesity due to excess calories with serious comorbidity and body mass index (BMI) of 40.0 to 44.9 in adult [facility])  Resolved Problems:    * No resolved hospital problems.  *      PLAN:  Soft diet  Leukocytosis improved  New IV antibiotics  Plan for 14 days Abx  Cont to monitor drain OP  IVF  Pain/Nausea control  DVT proph  Ambulate/IS  Likely DC home tomorrow    Lei Westfall DO   Electronically Signed on 6/23/2022 at 12:32 PM

## 2022-06-23 NOTE — CARE COORDINATION
06/23/22 1317   Service Assessment   Patient Orientation Alert and Oriented;Person;Place;Situation   Cognition Alert   History Provided By Patient   Primary Caregiver Self   Support Systems Spouse/Significant Other   PCP Verified by CM Yes   Prior Functional Level Independent in ADLs/IADLs   Current Functional Level Independent in ADLs/IADLs   Can patient return to prior living arrangement Yes   Ability to make needs known: Good   Family able to assist with home care needs: Yes   Social/Functional History   Lives With Spouse   Type of [address] One level   Home Access Level entry   ADL Assistance Independent   Ambulation Assistance Independent   Transfer Assistance Independent   Active  Yes   Discharge Planning   Type of [address] Prior To Admission C-pap   Potential Assistance Needed N/A   DME Ordered? No   Potential Assistance Purchasing Medications No   Meds-to-Beds: Does the patient want to have any new prescriptions delivered to bedside prior to discharge? Yes   Type of Home Care Services None   Patient expects to be discharged to: House   One/Two Story Residence One story   History of falls?  0   Services At/After Discharge   Mode of Transport at Discharge Other (see comment)  (spouse)

## 2022-06-24 VITALS
DIASTOLIC BLOOD PRESSURE: 89 MMHG | SYSTOLIC BLOOD PRESSURE: 141 MMHG | HEIGHT: 69 IN | BODY MASS INDEX: 41.47 KG/M2 | HEART RATE: 77 BPM | OXYGEN SATURATION: 96 % | TEMPERATURE: 97.5 F | RESPIRATION RATE: 16 BRPM | WEIGHT: 280 LBS

## 2022-06-24 LAB
BASOPHILS ABSOLUTE: 0.1 K/UL (ref 0–0.2)
BASOPHILS RELATIVE PERCENT: 0.8 % (ref 0–1)
EOSINOPHILS ABSOLUTE: 0.2 K/UL (ref 0–0.6)
EOSINOPHILS RELATIVE PERCENT: 2.3 % (ref 0–5)
GLUCOSE BLD-MCNC: 105 MG/DL (ref 70–99)
GLUCOSE BLD-MCNC: 126 MG/DL (ref 70–99)
HCT VFR BLD CALC: 42.2 % (ref 42–52)
HEMOGLOBIN: 13.4 G/DL (ref 14–18)
IMMATURE GRANULOCYTES #: 0.1 K/UL
LYMPHOCYTES ABSOLUTE: 4.3 K/UL (ref 1.1–4.5)
LYMPHOCYTES RELATIVE PERCENT: 46.9 % (ref 20–40)
MCH RBC QN AUTO: 30 PG (ref 27–31)
MCHC RBC AUTO-ENTMCNC: 31.8 G/DL (ref 33–37)
MCV RBC AUTO: 94.4 FL (ref 80–94)
MONOCYTES ABSOLUTE: 0.9 K/UL (ref 0–0.9)
MONOCYTES RELATIVE PERCENT: 9.9 % (ref 0–10)
NEUTROPHILS ABSOLUTE: 3.6 K/UL (ref 1.5–7.5)
NEUTROPHILS RELATIVE PERCENT: 39.2 % (ref 50–65)
PDW BLD-RTO: 13.9 % (ref 11.5–14.5)
PERFORMED ON: ABNORMAL
PERFORMED ON: ABNORMAL
PLATELET # BLD: 312 K/UL (ref 130–400)
PMV BLD AUTO: 10 FL (ref 9.4–12.4)
RBC # BLD: 4.47 M/UL (ref 4.7–6.1)
WBC # BLD: 9.2 K/UL (ref 4.8–10.8)

## 2022-06-24 PROCEDURE — 99024 POSTOP FOLLOW-UP VISIT: CPT | Performed by: SURGERY

## 2022-06-24 PROCEDURE — 36415 COLL VENOUS BLD VENIPUNCTURE: CPT

## 2022-06-24 PROCEDURE — 2580000003 HC RX 258: Performed by: SURGERY

## 2022-06-24 PROCEDURE — 6360000002 HC RX W HCPCS: Performed by: SURGERY

## 2022-06-24 PROCEDURE — 82947 ASSAY GLUCOSE BLOOD QUANT: CPT

## 2022-06-24 PROCEDURE — 6370000000 HC RX 637 (ALT 250 FOR IP): Performed by: SURGERY

## 2022-06-24 PROCEDURE — 85025 COMPLETE CBC W/AUTO DIFF WBC: CPT

## 2022-06-24 RX ORDER — OXYCODONE AND ACETAMINOPHEN 7.5; 325 MG/1; MG/1
1 TABLET ORAL EVERY 4 HOURS PRN
Qty: 18 TABLET | Refills: 0 | Status: SHIPPED | OUTPATIENT
Start: 2022-06-24 | End: 2022-06-27

## 2022-06-24 RX ORDER — AMOXICILLIN AND CLAVULANATE POTASSIUM 875; 125 MG/1; MG/1
1 TABLET, FILM COATED ORAL 2 TIMES DAILY
Qty: 20 TABLET | Refills: 0 | Status: SHIPPED | OUTPATIENT
Start: 2022-06-24 | End: 2022-07-04

## 2022-06-24 RX ORDER — DOCUSATE SODIUM 100 MG/1
100 CAPSULE, LIQUID FILLED ORAL 2 TIMES DAILY
Qty: 30 CAPSULE | Refills: 0 | Status: SHIPPED | OUTPATIENT
Start: 2022-06-24 | End: 2022-08-08

## 2022-06-24 RX ADMIN — DOCUSATE SODIUM 100 MG: 100 CAPSULE, LIQUID FILLED ORAL at 09:36

## 2022-06-24 RX ADMIN — ENOXAPARIN SODIUM 30 MG: 100 INJECTION SUBCUTANEOUS at 09:36

## 2022-06-24 RX ADMIN — DEXTROAMPHETAMINE SACCHARATE, AMPHETAMINE ASPARTATE MONOHYDRATE, DEXTROAMPHETAMINE SULFATE, AND AMPHETAMINE SULFATE 30 MG: 2.5; 2.5; 2.5; 2.5 CAPSULE, EXTENDED RELEASE ORAL at 06:59

## 2022-06-24 RX ADMIN — OXYCODONE HYDROCHLORIDE AND ACETAMINOPHEN 1 TABLET: 7.5; 325 TABLET ORAL at 11:03

## 2022-06-24 RX ADMIN — MORPHINE SULFATE 4 MG: 4 INJECTION, SOLUTION INTRAMUSCULAR; INTRAVENOUS at 12:35

## 2022-06-24 RX ADMIN — MORPHINE SULFATE 4 MG: 4 INJECTION, SOLUTION INTRAMUSCULAR; INTRAVENOUS at 05:55

## 2022-06-24 RX ADMIN — PIPERACILLIN SODIUM AND TAZOBACTAM SODIUM 4500 MG: 4; .5 INJECTION, POWDER, LYOPHILIZED, FOR SOLUTION INTRAVENOUS at 02:58

## 2022-06-24 RX ADMIN — SODIUM CHLORIDE, PRESERVATIVE FREE 10 ML: 5 INJECTION INTRAVENOUS at 09:37

## 2022-06-24 RX ADMIN — POLYETHYLENE GLYCOL 3350 17 G: 17 POWDER, FOR SOLUTION ORAL at 09:37

## 2022-06-24 RX ADMIN — LOSARTAN POTASSIUM 100 MG: 100 TABLET, FILM COATED ORAL at 09:36

## 2022-06-24 RX ADMIN — MORPHINE SULFATE 4 MG: 4 INJECTION, SOLUTION INTRAMUSCULAR; INTRAVENOUS at 09:37

## 2022-06-24 RX ADMIN — DIPHENHYDRAMINE HYDROCHLORIDE 25 MG: 25 TABLET ORAL at 06:14

## 2022-06-24 RX ADMIN — PIPERACILLIN SODIUM AND TAZOBACTAM SODIUM 4500 MG: 4; .5 INJECTION, POWDER, LYOPHILIZED, FOR SOLUTION INTRAVENOUS at 09:39

## 2022-06-24 RX ADMIN — OXYCODONE HYDROCHLORIDE AND ACETAMINOPHEN 1 TABLET: 7.5; 325 TABLET ORAL at 02:54

## 2022-06-24 ASSESSMENT — PAIN DESCRIPTION - PAIN TYPE
TYPE: SURGICAL PAIN
TYPE: SURGICAL PAIN

## 2022-06-24 ASSESSMENT — PAIN SCALES - GENERAL
PAINLEVEL_OUTOF10: 6
PAINLEVEL_OUTOF10: 7
PAINLEVEL_OUTOF10: 3

## 2022-06-24 ASSESSMENT — PAIN DESCRIPTION - ONSET
ONSET: ON-GOING
ONSET: ON-GOING

## 2022-06-24 ASSESSMENT — PAIN DESCRIPTION - LOCATION
LOCATION: ABDOMEN

## 2022-06-24 ASSESSMENT — PAIN - FUNCTIONAL ASSESSMENT
PAIN_FUNCTIONAL_ASSESSMENT: PREVENTS OR INTERFERES SOME ACTIVE ACTIVITIES AND ADLS
PAIN_FUNCTIONAL_ASSESSMENT: PREVENTS OR INTERFERES SOME ACTIVE ACTIVITIES AND ADLS

## 2022-06-24 ASSESSMENT — PAIN DESCRIPTION - DESCRIPTORS
DESCRIPTORS: ACHING
DESCRIPTORS: ACHING

## 2022-06-24 ASSESSMENT — PAIN DESCRIPTION - ORIENTATION
ORIENTATION: RIGHT
ORIENTATION: RIGHT

## 2022-06-24 ASSESSMENT — PAIN DESCRIPTION - FREQUENCY
FREQUENCY: CONTINUOUS
FREQUENCY: CONTINUOUS

## 2022-06-24 NOTE — PROGRESS NOTES
CLINICAL PHARMACY NOTE: MEDS TO BEDS    Total # of Prescriptions Filled: 3   The following medications were delivered to the patient:  · Amoxicillin-pot clavul 875  · Docusate sodium 100mg  · Oxycodone/Apap 7.5/325mg    Additional Documentation:    Delivered to patient and wife at bedside, paid cash.

## 2022-06-24 NOTE — DISCHARGE INSTR - DIET
Good nutrition is important when healing from an illness, injury, or surgery. Follow any nutrition recommendations given to you during your hospital stay. If you were given an oral nutrition supplement while in the hospital, continue to take this supplement at home. You can take it with meals, in-between meals, and/or before bedtime. These supplements can be purchased at most local grocery stores, pharmacies, and chain [employer]-stores. If you have any questions about your diet or nutrition, call the hospital and ask for the dietitian.     Soft and bite size foods

## 2022-06-27 ENCOUNTER — CARE COORDINATION (OUTPATIENT)
Dept: CASE MANAGEMENT | Age: 42
End: 2022-06-27

## 2022-06-27 ENCOUNTER — TELEPHONE (OUTPATIENT)
Dept: INTERNAL MEDICINE | Age: 42
End: 2022-06-27

## 2022-06-27 NOTE — DISCHARGE SUMMARY
Physician Discharge Summary     Patient ID:  Raghavendra Winter  [id number]  61 y.o. Admit date: 6/21/2022    Discharge date and time: 6/24/2022  2:51 PM     Admitting Physician: Petr Corea DO     Admission Diagnoses: Elevated C-reactive protein (CRP) [R79.82]  Dehydration [E86.0]  Elevated sed rate [R70.0]  Acute appendicitis [K35.80]  Acute appendicitis with generalized peritonitis, without abscess, unspecified whether gangrene present, unspecified whether perforation present [K35.20]  Acute appendicitis with perforation and localized peritonitis, with abscess [K35.33]    Discharge Diagnoses:   Patient Active Problem List   Diagnosis    Obstructive sleep apnea    Narcolepsy without cataplexy    CPAP (continuous positive airway pressure) dependence    Essential (primary) hypertension    Hyperlipidemia    Chronic midline low back pain with bilateral sciatica    Chronic midline thoracic back pain    Class 3 severe obesity due to excess calories with serious comorbidity and body mass index (BMI) of 40.0 to 44.9 in adult [facility])    Coccydynia    Acute appendicitis    Acute appendicitis with perforation and localized peritonitis, with abscess       Discharged Condition: good    Hospital Course: Patient was admitted overnight of 6/20. On 6/21 he underwent diagnostic laparoscopy with attempted appendectomy. Patient with significant right lower quadrant inflammation. Drain was left at that time and the procedure was aborted. He was kept on IV antibiotics and his white count did normalize. Patient had his diet advanced until he was tolerating it and having bowel movements. His [place] drain was removed and he was discharged home in stable condition. Patient will continue antibiotics for total of 10 days. Consults: none    Significant Diagnostic Studies:    CBC:No results for input(s): WBC, RBC, HGB, HCT, MCV, MCH, MCHC, RDW, PLT, MPV in the last 72 hours. Imaging as per medical record.     Disposition: home    Patient Instructions: Activity: no lifting, or Strenuous exercise for 3 weeks  Diet: regular diet  Wound Care: keep wound clean and dry    Follow-up with Dr. Geena Campos in 2 weeks.     Signed:  Joshua Maldonado DO  6/27/2022  12:40 PM

## 2022-06-27 NOTE — CARE COORDINATION
Jamir 45 Transitions Initial Follow Up Call    Call within 2 business days of discharge: Yes    Patient: Kaya Brown Patient : 1980   MRN: [id number]  Reason for Admission: Acute Appendicitis  Discharge Date: 22 RARS: Readmission Risk Score: 7.3 ( )      Last Discharge [facility]       Complaint Diagnosis Description Type Department Provider    22 Abdominal Pain Acute appendicitis with generalized peritonitis, without abscess, unspecified whether gangrene present, unspecified whether perforation present . .. ED to Hosp-Admission (Discharged) (ADMITTED) [facility] [address] [facility], DO; Bonilla Campbell. .. Spoke with: ST ROMOTNOY, wife    Facility: [facility]    Non-face-to-face services provided:  Reviewed encounter information for continuity of care prior to follow up Care Transitions Coordination phone call - chart notes, consults, progress notes, test results, med list, appointments, AVS, other information.       Care Transitions 24 Hour Call    Schedule Follow Up Appointment with PCP: Scott Chaudhary you have a copy of your discharge instructions?: Yes  Do you have all of your prescriptions and are they filled?: Yes  Have you been contacted by a [facility] Pharmacist?: No  Have you scheduled your follow up appointment?: Yes  How are you going to get to your appointment?: Car - family or friend to transport  Do you feel like you have everything you need to keep you well at home?: Yes  Care Transitions Interventions         Follow Up  Future Appointments   Date Time Provider Philip Hendrix   2022  2:00 PM MD CONNOR [facility]   2022  2:00 PM Katie Hartley DO N LPS Gen SG [facility]   2022  3:45 PM Rosemary Kidd MD N LPS NEURO [facility]   2022  8:00 AM MD CONNOR [facility]     Placed a call to the number listed for patient for an initial follow up call for Care Transitions Coordination following discharge from the hospital.  Introduced myself and explained the purpose of my call as well as verifying name, date of birth of patient. Spoke with patient's wife regarding hospitalization, discharge and status thus far. She said the number listed is her number. She said he is doing well. She said he is having more pain that she thought he would be at this time. She said that he is taking pain meds on occasion, but not often. She said that he is passing flatulence and is having bms. He is eating well, has all along she said. He is drinking plenty of fluids. She said that his incisions look good, no redness, inflammation, drainage, etc.  There is a light dressing over the site where the drain was she said, clean, dry, intact. No cough, congestion reported. No leg pain or swelling. No other sign of post op complications. He is up and about. Encouraged to walk frequently. She is aware of his follow up appointment with the surgeon. She said they will make one with his PCP. She has all of his meds, he is taking them as ordered. No other issues reported. Reminded of the purpose of Care Transitions Coordination calls. Given the opportunity to ask questions and answered appropriately. Ensured to have CTN contact information to be used as needed. Encouraged to call for new/ongoing issues, questions, concerns, etc if CTN can be of assistance. Encouraged to make contact with PCP as indicated for any further needs as well. No further scheduled calls needed at this time. Transitions of Care Initial Call    Was this an external facility discharge? No    Challenges to be reviewed by the provider   Additional needs identified to be addressed with provider: No       Method of communication with provider : none    Advance Care Planning:   Does patient have an Advance Directive: not on file; education provided. Care Transition Nurse contacted the caregiver by telephone to perform post hospital discharge assessment. Verified name and  with caregiver as identifiers. Provided introduction to self, and explanation of the CTN role. CTN reviewed discharge instructions, medical action plan and red flags with caregiver who verbalized understanding. Caregiver given an opportunity to ask questions and does not have any further questions or concerns at this time. Were discharge instructions available to patient? Yes. Reviewed appropriate site of care based on symptoms and resources available to patient including: PCP  Specialist  Urgent care clinics  [facility]   When to call [address] The caregiver agrees to contact the PCP office for questions related to their healthcare. Medication reconciliation was performed with caregiver, who verbalizes understanding of administration of home medications. CTN provided contact information. No further follow-up call indicated based on severity of symptoms and risk factors.     Violetta Chaparro RN

## 2022-06-27 NOTE — PROGRESS NOTES
[facility] PHYSICIAN SERVICES  [facility] FAMILY MEDICINE  [address] [facility] [address] [address]  Dept: [phone]  Dept Fax: [phone]  Loc: [phone]    Bret Macias is a 42 y.o. male who presents today for his medical conditions/complaints asnoted below. Bret Macias is here for Follow-Up from \[facility]\"" date: 6/21/2022     Discharge date and time: 6/24/2022  2:51 PM      Admitting Physician: Anamaria Rojas, DO      Admission Diagnoses: Elevated C-reactive protein (CRP) [R79.82]  Dehydration [E86.0]  Elevated sed rate [R70.0]  Acute appendicitis [K35.80]  Acute appendicitis with generalized peritonitis, without abscess, unspecified whether gangrene present, unspecified whether perforation present [K35.20]  Acute appendicitis with perforation and localized peritonitis, with abscess [K35.33]           Inpatient course: Discharge summary reviewed- see chart for full details. 50-year-old admitted with acute pancreatitis. He underwent diagnostic laparoscopy with attempted appendectomy on June 21. Procedure was aborted and a drain was left. He was kept on IV antibiotics and his white count normalized. He had his diet advanced till he was tolerating it and having bowel movements. [place] drain was removed and he was discharged home. He was to complete his antibiotics. Total # of Prescriptions Filled: 3              The following medications were delivered to the patient:  · Amoxicillin-pot clavul 875  · Docusate sodium 100mg  · Oxycodone/Apap 7.5/325mg    He was to follow-up with a general surgeon 2 weeks after discharge. [facility] Risk Score (risk of hospital readmission):Readmission Risk Score: 7.3 ( )    Active Problems:    * No active hospital problems.  *      Care management risk score Rising risk (score 2-5) and Complex Care (Scores >=6): 5     Non face to face  following discharge, date last encounter closed (first attempt may havebeen earlier): 6/27/2022 11:58 AM    Call initiated 2 business days of discharge: Yes      HPI andCOURSE SINCE DISCHARGE   CC:  Here today to discuss thefollowing:    Is here today for follow-up postoperatively. Dr Christy Oconnor called in via speaker phone while patient and his wife are here. Three options were given:  1. Open laparotomy    2. Repeat laparoscopy after completed oral antibiotics  3. Inpatient treatment for iv antibiotics. Patient and his surgeon decided on oral antibiotics and follow up early next week with surgeon. Currently, he is not experiencing any fever chills nausea or vomiting. His abdominal pain is very mild. His bowels are regular.   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)     Obstructive sleep apnea     AHI:  29.0 per PSG, 2012      Past Surgical History:   Procedure Laterality Date    ABSCESS DRAINAGE      LAPAROSCOPIC APPENDECTOMY N/A 6/21/2022    DIAGNOSTIC LAPAROSCOPY WITH DRAINAGE OF ABCESS AND DRAIN PLACEMENT performed by Law Paris DO at [address]       Family History   Problem Relation Age of Onset    Diabetes Mother     Hypertension Mother     Asthma Mother     Coronary Art Dis Father     Heart Attack Father     Hypertension Father     Cancer Maternal Grandmother         kidney    Stroke Paternal Grandfather     Cancer Paternal Grandfather         lung       Social History     Tobacco Use    Smoking status: Never Smoker    Smokeless tobacco: Never Used   Substance Use Topics    Alcohol use: Yes     Comment: twice a year social        Medications patient taking as of now reconciled against medications ordered at time of hospital discharge     Current Outpatient Medications   Medication Sig Dispense Refill    docusate sodium (COLACE) 100 MG capsule Take 1 capsule by mouth 2 times daily 30 capsule 0    amoxicillin-clavulanate (AUGMENTIN) 875-125 MG per tablet Take 1 tablet by mouth 2 times daily for 10 days 20 tablet 0    amphetamine-dextroamphetamine (ADDERALL XR) 30 MG extended release capsule Take 1 capsule by mouth 2 times daily for 30 days. 60 capsule 0    doxycycline monohydrate (MONODOX) 100 MG capsule Take 1 capsule by mouth daily 30 capsule 4    irbesartan (AVAPRO) 300 MG tablet Take 1 tablet by mouth daily 90 tablet 3     No current facility-administered medications for this visit. No Known Allergies    Health Maintenance   Topic Date Due    Pneumococcal 0-64 years Vaccine (1 - PCV) Never done    Diabetic foot exam  Never done    HIV screen  Never done    Diabetic retinal exam  Never done    Hepatitis C screen  Never done    Hepatitis B vaccine (1 of 3 - Risk 3-dose series) Never done    DTaP/Tdap/Td vaccine (1 - Tdap) 05/02/2012    COVID-19 Vaccine (1) 09/01/2022 (Originally 11/6/1984)    Flu vaccine (Season Ended) 03/17/2023 (Originally 9/1/2022)    A1C test (Diabetic or Prediabetic)  02/25/2023    Diabetic microalbuminuria test  02/25/2023    Lipids  02/25/2023    Depression Screen  05/25/2023    Hepatitis A vaccine  Aged Out    Hib vaccine  Aged Out    Meningococcal (ACWY) vaccine  Aged Out       Subjective:      Review of Systems   Constitutional: Negative for chills and fever. Gastrointestinal: Positive for abdominal pain. Negative for abdominal distention, anal bleeding, blood in stool, constipation, diarrhea and nausea. See HPI for visit specific review of symptoms. All others negative      Objective:   /78   Pulse 89   Temp 97.3 °F (36.3 °C)   Wt 286 lb (129.7 kg)   SpO2 98%   BMI 42.23 kg/m²   Physical Exam  Constitutional:       Appearance: He is well-developed. He is not ill-appearing. Cardiovascular:      Rate and Rhythm: Normal rate and regular rhythm.       Heart sounds: No murmur heard.  No friction rub. Pulmonary:      Effort: Pulmonary effort is normal. No respiratory distress. Breath sounds: Normal breath sounds. No wheezing or rales. Abdominal:      General: There is no distension. Palpations: Abdomen is soft. Tenderness: There is no abdominal tenderness. Musculoskeletal:      Cervical back: Neck supple. No tenderness. Lymphadenopathy:      Cervical: No cervical adenopathy. Neurological:      Mental Status: He is alert.    Psychiatric:         Behavior: Behavior normal.           Recent Results (from the past 672 hour(s))   CBC with Auto Differential    Collection Time: 06/21/22  3:20 AM   Result Value Ref Range    WBC 16.1 (H) 4.8 - 10.8 K/uL    RBC 5.15 4.70 - 6.10 M/uL    Hemoglobin 15.4 14.0 - 18.0 g/dL    Hematocrit 49.3 42.0 - 52.0 %    MCV 95.7 (H) 80.0 - 94.0 fL    MCH 29.9 27.0 - 31.0 pg    MCHC 31.2 (L) 33.0 - 37.0 g/dL    RDW 14.0 11.5 - 14.5 %    Platelets 776 779 - 589 K/uL    MPV 10.6 9.4 - 12.4 fL    Neutrophils % 69.3 (H) 50.0 - 65.0 %    Lymphocytes % 19.7 (L) 20.0 - 40.0 %    Monocytes % 9.4 0.0 - 10.0 %    Eosinophils % 0.6 0.0 - 5.0 %    Basophils % 0.4 0.0 - 1.0 %    Neutrophils Absolute 11.2 (H) 1.5 - 7.5 K/uL    Immature Granulocytes # 0.1 K/uL    Lymphocytes Absolute 3.2 1.1 - 4.5 K/uL    Monocytes Absolute 1.50 (H) 0.00 - 0.90 K/uL    Eosinophils Absolute 0.10 0.00 - 0.60 K/uL    Basophils Absolute 0.10 0.00 - 0.20 K/uL   Comprehensive Metabolic Panel    Collection Time: 06/21/22  3:20 AM   Result Value Ref Range    Sodium 139 136 - 145 mmol/L    Potassium 3.9 3.5 - 5.0 mmol/L    Chloride 103 98 - 111 mmol/L    CO2 27 22 - 29 mmol/L    Anion Gap 9 7 - 19 mmol/L    Glucose 125 (H) 74 - 109 mg/dL    BUN 14 6 - 20 mg/dL    CREATININE 0.9 0.5 - 1.2 mg/dL    GFR Non-African American >60 >60    GFR African American >59 >59    Calcium 9.0 8.6 - 10.0 mg/dL    Total Protein 7.0 6.6 - 8.7 g/dL    Albumin 4.0 3.5 - 5.2 g/dL    Total Bilirubin 0.6 0.2 - 1.2 mg/dL    Alkaline Phosphatase 61 40 - 130 U/L    ALT 15 5 - 41 U/L    AST 11 5 - 40 U/L   Lipase    Collection Time: 06/21/22  3:20 AM   Result Value Ref Range    Lipase 21 13 - 60 U/L   Sedimentation Rate    Collection Time: 06/21/22  3:20 AM   Result Value Ref Range    Sed Rate 21 (H) 0 - 10 mm/Hr   C-Reactive Protein    Collection Time: 06/21/22  3:20 AM   Result Value Ref Range    CRP 11.20 (H) 0.00 - 0.50 mg/dL   Urinalysis with Reflex to Culture    Collection Time: 06/21/22  3:25 AM    Specimen: Urine   Result Value Ref Range    Color, UA DARK YELLOW (A) Straw/Yellow    Clarity, UA CLOUDY (A) Clear    Glucose, Ur Negative Negative mg/dL    Bilirubin Urine Negative Negative    Ketones, Urine TRACE (A) Negative mg/dL    Specific Gravity, UA 1.032 1.005 - 1.030    Blood, Urine Negative Negative    pH, UA 5.5 5.0 - 8.0    Protein, UA TRACE (A) Negative mg/dL    Urobilinogen, Urine 1.0 <2.0 E.U./dL    Nitrite, Urine Negative Negative    Leukocyte Esterase, Urine Negative Negative   EKG 12 Lead    Collection Time: 06/21/22  4:17 AM   Result Value Ref Range    P-R Interval 136 ms    QRS Duration 98 ms    Q-T Interval 372 ms    QTc Calculation (Bazett) 421 ms    P Axis 25 degrees    T Axis 62 degrees   COVID-19, Rapid    Collection Time: 06/21/22  5:26 AM    Specimen: Nasopharyngeal Swab   Result Value Ref Range    SARS-CoV-2, NAAT Not Detected Not Detected   POCT Glucose    Collection Time: 06/21/22 11:45 AM   Result Value Ref Range    POC Glucose 162 (H) 70 - 99 mg/dl    Performed on AccuChek    POCT Glucose    Collection Time: 06/21/22  5:15 PM   Result Value Ref Range    POC Glucose 114 (H) 70 - 99 mg/dl    Performed on AccuChek    POCT Glucose    Collection Time: 06/21/22  8:28 PM   Result Value Ref Range    POC Glucose 156 (H) 70 - 99 mg/dl    Performed on AccuChek    Basic Metabolic Panel w/ Reflex to MG    Collection Time: 06/22/22  4:14 AM   Result Value Ref Range    Sodium 137 136 - 145 mmol/L Potassium reflex Magnesium 4.6 3.5 - 5.0 mmol/L    Chloride 105 98 - 111 mmol/L    CO2 25 22 - 29 mmol/L    Anion Gap 7 7 - 19 mmol/L    Glucose 121 (H) 74 - 109 mg/dL    BUN 11 6 - 20 mg/dL    CREATININE 0.8 0.5 - 1.2 mg/dL    GFR Non-African American >60 >60    GFR African American >59 >59    Calcium 8.3 (L) 8.6 - 10.0 mg/dL   CBC with Auto Differential    Collection Time: 06/22/22  4:14 AM   Result Value Ref Range    WBC 21.0 (H) 4.8 - 10.8 K/uL    RBC 4.35 (L) 4.70 - 6.10 M/uL    Hemoglobin 13.0 (L) 14.0 - 18.0 g/dL    Hematocrit 41.4 (L) 42.0 - 52.0 %    MCV 95.2 (H) 80.0 - 94.0 fL    MCH 29.9 27.0 - 31.0 pg    MCHC 31.4 (L) 33.0 - 37.0 g/dL    RDW 14.0 11.5 - 14.5 %    Platelets 687 499 - 906 K/uL    MPV 10.5 9.4 - 12.4 fL    Neutrophils % 77.3 (H) 50.0 - 65.0 %    Lymphocytes % 12.7 (L) 20.0 - 40.0 %    Monocytes % 9.2 0.0 - 10.0 %    Eosinophils % 0.0 0.0 - 5.0 %    Basophils % 0.1 0.0 - 1.0 %    Neutrophils Absolute 16.2 (H) 1.5 - 7.5 K/uL    Immature Granulocytes # 0.1 K/uL    Lymphocytes Absolute 2.7 1.1 - 4.5 K/uL    Monocytes Absolute 1.90 (H) 0.00 - 0.90 K/uL    Eosinophils Absolute 0.00 0.00 - 0.60 K/uL    Basophils Absolute 0.00 0.00 - 0.20 K/uL   POCT Glucose    Collection Time: 06/22/22  7:21 AM   Result Value Ref Range    POC Glucose 113 (H) 70 - 99 mg/dl    Performed on AccuChek    POCT Glucose    Collection Time: 06/22/22 11:11 AM   Result Value Ref Range    POC Glucose 120 (H) 70 - 99 mg/dl    Performed on AccuChek    POCT Glucose    Collection Time: 06/22/22  4:26 PM   Result Value Ref Range    POC Glucose 105 (H) 70 - 99 mg/dl    Performed on AccuChek    POCT Glucose    Collection Time: 06/22/22  9:02 PM   Result Value Ref Range    POC Glucose 133 (H) 70 - 99 mg/dl    Performed on AccuChek    POCT Glucose    Collection Time: 06/23/22  7:25 AM   Result Value Ref Range    POC Glucose 81 70 - 99 mg/dl    Performed on AccuChek    CBC with Auto Differential    Collection Time: 06/23/22  7:56 AM Result Value Ref Range    WBC 12.4 (H) 4.8 - 10.8 K/uL    RBC 4.50 (L) 4.70 - 6.10 M/uL    Hemoglobin 13.8 (L) 14.0 - 18.0 g/dL    Hematocrit 43.0 42.0 - 52.0 %    MCV 95.6 (H) 80.0 - 94.0 fL    MCH 30.7 27.0 - 31.0 pg    MCHC 32.1 (L) 33.0 - 37.0 g/dL    RDW 14.1 11.5 - 14.5 %    Platelets 220 170 - 988 K/uL    MPV 10.8 9.4 - 12.4 fL    Neutrophils % 50.9 50.0 - 65.0 %    Lymphocytes % 38.2 20.0 - 40.0 %    Monocytes % 8.6 0.0 - 10.0 %    Eosinophils % 1.0 0.0 - 5.0 %    Basophils % 0.6 0.0 - 1.0 %    Neutrophils Absolute 6.3 1.5 - 7.5 K/uL    Immature Granulocytes # 0.1 K/uL    Lymphocytes Absolute 4.8 (H) 1.1 - 4.5 K/uL    Monocytes Absolute 1.10 (H) 0.00 - 0.90 K/uL    Eosinophils Absolute 0.10 0.00 - 0.60 K/uL    Basophils Absolute 0.10 0.00 - 0.20 K/uL   Basic Metabolic Panel    Collection Time: 06/23/22  7:56 AM   Result Value Ref Range    Sodium 142 136 - 145 mmol/L    Potassium 4.8 3.5 - 5.0 mmol/L    Chloride 105 98 - 111 mmol/L    CO2 29 22 - 29 mmol/L    Anion Gap 8 7 - 19 mmol/L    Glucose 100 74 - 109 mg/dL    BUN 10 6 - 20 mg/dL    CREATININE 0.8 0.5 - 1.2 mg/dL    GFR Non-African American >60 >60    GFR African American >59 >59    Calcium 8.5 (L) 8.6 - 10.0 mg/dL   POCT Glucose    Collection Time: 06/23/22 11:18 AM   Result Value Ref Range    POC Glucose 112 (H) 70 - 99 mg/dl    Performed on AccuChek    POCT Glucose    Collection Time: 06/23/22  4:30 PM   Result Value Ref Range    POC Glucose 103 (H) 70 - 99 mg/dl    Performed on AccuChek    POCT Glucose    Collection Time: 06/23/22  8:47 PM   Result Value Ref Range    POC Glucose 116 (H) 70 - 99 mg/dl    Performed on AccuChek    POCT Glucose    Collection Time: 06/24/22  7:19 AM   Result Value Ref Range    POC Glucose 105 (H) 70 - 99 mg/dl    Performed on AccuChek    CBC with Auto Differential    Collection Time: 06/24/22  7:49 AM   Result Value Ref Range    WBC 9.2 4.8 - 10.8 K/uL    RBC 4.47 (L) 4.70 - 6.10 M/uL    Hemoglobin 13.4 (L) 14.0 - 18.0 g/dL    Hematocrit 42.2 42.0 - 52.0 %    MCV 94.4 (H) 80.0 - 94.0 fL    MCH 30.0 27.0 - 31.0 pg    MCHC 31.8 (L) 33.0 - 37.0 g/dL    RDW 13.9 11.5 - 14.5 %    Platelets 565 125 - 895 K/uL    MPV 10.0 9.4 - 12.4 fL    Neutrophils % 39.2 (L) 50.0 - 65.0 %    Lymphocytes % 46.9 (H) 20.0 - 40.0 %    Monocytes % 9.9 0.0 - 10.0 %    Eosinophils % 2.3 0.0 - 5.0 %    Basophils % 0.8 0.0 - 1.0 %    Neutrophils Absolute 3.6 1.5 - 7.5 K/uL    Immature Granulocytes # 0.1 K/uL    Lymphocytes Absolute 4.3 1.1 - 4.5 K/uL    Monocytes Absolute 0.90 0.00 - 0.90 K/uL    Eosinophils Absolute 0.20 0.00 - 0.60 K/uL    Basophils Absolute 0.10 0.00 - 0.20 K/uL   POCT Glucose    Collection Time: 06/24/22 11:14 AM   Result Value Ref Range    POC Glucose 126 (H) 70 - 99 mg/dl    Performed on AccuChek    Basic Metabolic Panel    Collection Time: 06/28/22 12:29 PM   Result Value Ref Range    Sodium 139 136 - 145 mmol/L    Potassium 4.1 3.5 - 5.0 mmol/L    Chloride 104 98 - 111 mmol/L    CO2 24 22 - 29 mmol/L    Anion Gap 11 7 - 19 mmol/L    Glucose 106 74 - 109 mg/dL    BUN 17 6 - 20 mg/dL    CREATININE 0.8 0.5 - 1.2 mg/dL    GFR Non-African American >60 >60    GFR African American >59 >59    Calcium 9.2 8.6 - 10.0 mg/dL   CBC with Auto Differential    Collection Time: 06/28/22 12:29 PM   Result Value Ref Range    WBC 9.6 4.8 - 10.8 K/uL    RBC 5.56 4.70 - 6.10 M/uL    Hemoglobin 16.5 14.0 - 18.0 g/dL    Hematocrit 52.0 42.0 - 52.0 %    MCV 93.5 80.0 - 94.0 fL    MCH 29.7 27.0 - 31.0 pg    MCHC 31.7 (L) 33.0 - 37.0 g/dL    RDW 13.8 11.5 - 14.5 %    Platelets 080 897 - 620 K/uL    MPV 9.8 9.4 - 12.4 fL    Neutrophils % 49.2 (L) 50.0 - 65.0 %    Lymphocytes % 38.8 20.0 - 40.0 %    Monocytes % 8.4 0.0 - 10.0 %    Eosinophils % 1.7 0.0 - 5.0 %    Basophils % 0.6 0.0 - 1.0 %    Neutrophils Absolute 4.7 1.5 - 7.5 K/uL    Immature Granulocytes # 0.1 K/uL    Lymphocytes Absolute 3.7 1.1 - 4.5 K/uL    Monocytes Absolute 0.80 0.00 - 0.90 K/uL Eosinophils Absolute 0.20 0.00 - 0.60 K/uL    Basophils Absolute 0.10 0.00 - 0.20 K/uL       Assessment & Plan: The following diagnoses and conditions are stable with no further orders unless indicated:  1. Acute appendicitis, unspecified acute appendicitis type  Three-way conversation with the patient and his general surgeon Dr. Steven Mckinnon were laid out for the patient  Plan in case he develops worsening pain nausea vomiting or fever was discussed as well. Instructions given on when to go to emergency department. States he has Percocet at home but has not had to take any  Encouraged rest of this week and receives further recommendations from his general surgeon  Diagnostic test results reviewed. Medical Decision Making: high complexity    No follow-ups on file.

## 2022-06-28 ENCOUNTER — OFFICE VISIT (OUTPATIENT)
Dept: FAMILY MEDICINE CLINIC | Age: 42
End: 2022-06-28
Payer: COMMERCIAL

## 2022-06-28 ENCOUNTER — TELEPHONE (OUTPATIENT)
Dept: SURGERY | Age: 42
End: 2022-06-28

## 2022-06-28 ENCOUNTER — HOSPITAL ENCOUNTER (OUTPATIENT)
Dept: CT IMAGING | Age: 42
Discharge: HOME | End: 2022-06-28
Payer: COMMERCIAL

## 2022-06-28 VITALS
BODY MASS INDEX: 42.23 KG/M2 | DIASTOLIC BLOOD PRESSURE: 78 MMHG | HEART RATE: 89 BPM | WEIGHT: 286 LBS | SYSTOLIC BLOOD PRESSURE: 112 MMHG | OXYGEN SATURATION: 98 % | TEMPERATURE: 97.3 F

## 2022-06-28 DIAGNOSIS — K35.33 ACUTE APPENDICITIS WITH PERFORATION, LOCALIZED PERITONITIS, AND ABSCESS, UNSPECIFIED WHETHER GANGRENE PRESENT: ICD-10-CM

## 2022-06-28 DIAGNOSIS — K35.33 ACUTE APPENDICITIS WITH PERFORATION, LOCALIZED PERITONITIS, AND ABSCESS, UNSPECIFIED WHETHER GANGRENE PRESENT: Primary | ICD-10-CM

## 2022-06-28 DIAGNOSIS — K35.80 ACUTE APPENDICITIS, UNSPECIFIED ACUTE APPENDICITIS TYPE: Primary | ICD-10-CM

## 2022-06-28 LAB
ANION GAP SERPL CALCULATED.3IONS-SCNC: 11 MMOL/L (ref 7–19)
BASOPHILS ABSOLUTE: 0.1 K/UL (ref 0–0.2)
BASOPHILS RELATIVE PERCENT: 0.6 % (ref 0–1)
BUN BLDV-MCNC: 17 MG/DL (ref 6–20)
CALCIUM SERPL-MCNC: 9.2 MG/DL (ref 8.6–10)
CHLORIDE BLD-SCNC: 104 MMOL/L (ref 98–111)
CO2: 24 MMOL/L (ref 22–29)
CREAT SERPL-MCNC: 0.8 MG/DL (ref 0.5–1.2)
EOSINOPHILS ABSOLUTE: 0.2 K/UL (ref 0–0.6)
EOSINOPHILS RELATIVE PERCENT: 1.7 % (ref 0–5)
GFR AFRICAN AMERICAN: >59
GFR NON-AFRICAN AMERICAN: >60
GLUCOSE BLD-MCNC: 106 MG/DL (ref 74–109)
HCT VFR BLD CALC: 52 % (ref 42–52)
HEMOGLOBIN: 16.5 G/DL (ref 14–18)
IMMATURE GRANULOCYTES #: 0.1 K/UL
LYMPHOCYTES ABSOLUTE: 3.7 K/UL (ref 1.1–4.5)
LYMPHOCYTES RELATIVE PERCENT: 38.8 % (ref 20–40)
MCH RBC QN AUTO: 29.7 PG (ref 27–31)
MCHC RBC AUTO-ENTMCNC: 31.7 G/DL (ref 33–37)
MCV RBC AUTO: 93.5 FL (ref 80–94)
MONOCYTES ABSOLUTE: 0.8 K/UL (ref 0–0.9)
MONOCYTES RELATIVE PERCENT: 8.4 % (ref 0–10)
NEUTROPHILS ABSOLUTE: 4.7 K/UL (ref 1.5–7.5)
NEUTROPHILS RELATIVE PERCENT: 49.2 % (ref 50–65)
PDW BLD-RTO: 13.8 % (ref 11.5–14.5)
PLATELET # BLD: 380 K/UL (ref 130–400)
PMV BLD AUTO: 9.8 FL (ref 9.4–12.4)
POTASSIUM SERPL-SCNC: 4.1 MMOL/L (ref 3.5–5)
RBC # BLD: 5.56 M/UL (ref 4.7–6.1)
SODIUM BLD-SCNC: 139 MMOL/L (ref 136–145)
WBC # BLD: 9.6 K/UL (ref 4.8–10.8)

## 2022-06-28 PROCEDURE — 99496 TRANSJ CARE MGMT HIGH F2F 7D: CPT | Performed by: FAMILY MEDICINE

## 2022-06-28 PROCEDURE — 6360000004 HC RX CONTRAST MEDICATION: Performed by: SURGERY

## 2022-06-28 PROCEDURE — 74177 CT ABD & PELVIS W/CONTRAST: CPT

## 2022-06-28 PROCEDURE — 74177 CT ABD & PELVIS W/CONTRAST: CPT | Performed by: RADIOLOGY

## 2022-06-28 RX ADMIN — IOPAMIDOL 90 ML: 755 INJECTION, SOLUTION INTRAVENOUS at 12:06

## 2022-06-28 ASSESSMENT — ENCOUNTER SYMPTOMS
ANAL BLEEDING: 0
CONSTIPATION: 0
ABDOMINAL DISTENTION: 0
BLOOD IN STOOL: 0
ABDOMINAL PAIN: 1
DIARRHEA: 0
NAUSEA: 0

## 2022-06-28 NOTE — TELEPHONE ENCOUNTER
Called patient's wife. She states he is having the same problems now that he had preop. He complains of nausea, RLQ pain, pain on rebound, she does not know if he has fever, he was asleep earlier. She states they have an appt with Dr. Angelita Ly at 2:00 today but they wanted to make sure they talked to you to see what you would want them to do.

## 2022-06-28 NOTE — TELEPHONE ENCOUNTER
6/28/2022 Patient's wife [facility] called in and stated that her  is having bad symptoms again same as before his surgery and needs to see Dr ASAP. Contact # [phone].   luz

## 2022-06-28 NOTE — TELEPHONE ENCOUNTER
Earlier today I did talk to the patient's wife and she noted he was still having some right lower quadrant pain and she was having concern. CBC, BMP and CT of the abdomen were ordered. White blood cell count still within normal limits. CT of the abdomen showing improving periappendiceal stranding. This afternoon I did talk to the patient, his wife and Dr. Magaly Prado on speaker phone. We discussed at length the current options of admission with IV antibiotics, continued current care and operative intervention. At this time if he would undergo operative intervention I do feel the patient is sure to have an open surgery due to the inflammation noted on his attempted appendectomy. I did explain that if we were able to decrease inflammation and wait 2 to 3 months we would likely have a greater chance at accomplishing the procedure in a minimally invasive fashion. After long discussion the patient states that he would like to hold off and continue his antibiotics at home. If he does have any increase in pain in the next few days he will come to the hospital for IV antibiotics.     Petr Corea DO

## 2022-07-07 ENCOUNTER — OFFICE VISIT (OUTPATIENT)
Dept: SURGERY | Age: 42
End: 2022-07-07

## 2022-07-07 VITALS
OXYGEN SATURATION: 98 % | WEIGHT: 286 LBS | HEIGHT: 69 IN | SYSTOLIC BLOOD PRESSURE: 128 MMHG | BODY MASS INDEX: 42.36 KG/M2 | DIASTOLIC BLOOD PRESSURE: 82 MMHG | HEART RATE: 88 BPM

## 2022-07-07 DIAGNOSIS — K35.33 ACUTE APPENDICITIS WITH PERFORATION, LOCALIZED PERITONITIS, AND ABSCESS, UNSPECIFIED WHETHER GANGRENE PRESENT: Primary | ICD-10-CM

## 2022-07-07 PROCEDURE — 99024 POSTOP FOLLOW-UP VISIT: CPT | Performed by: SURGERY

## 2022-07-07 ASSESSMENT — ENCOUNTER SYMPTOMS
COUGH: 0
CONSTIPATION: 0
WHEEZING: 0
EYE REDNESS: 0
SHORTNESS OF BREATH: 0
ABDOMINAL PAIN: 1
EYE DISCHARGE: 0
NAUSEA: 0
DIARRHEA: 0
EYE PAIN: 0
FACIAL SWELLING: 0
CHEST TIGHTNESS: 0

## 2022-07-07 NOTE — PROGRESS NOTES
SUBJECTIVE:  Mr. Lisette Mobley is a 42 y.o. male who presents today postoperatively after diagnostic laparoscopy for acute appendicitis with drain placement. Patient doing well on his antibiotics. Pain has improved. Denies fevers or chills. Requesting to go back to work. Denies nausea or vomiting. Patient's medications, allergies, past medical, surgical, social and family histories werereviewed and updated as appropriate. Review of Systems   Constitutional: Positive for fatigue. Negative for chills, diaphoresis and fever. HENT: Negative for ear discharge, ear pain, facial swelling and nosebleeds. Eyes: Negative for pain, discharge and redness. Respiratory: Negative for cough, chest tightness, shortness of breath and wheezing. Cardiovascular: Negative for chest pain and palpitations. Gastrointestinal: Positive for abdominal pain. Negative for constipation, diarrhea and nausea. Genitourinary: Negative for difficulty urinating, flank pain and hematuria. Musculoskeletal: Negative for neck pain and neck stiffness. Neurological: Negative for dizziness, numbness and headaches. Psychiatric/Behavioral: Negative for agitation, behavioral problems and self-injury. OBJECTIVE:  /82   Pulse 88   Ht 5' 9\" (1.753 m)   Wt 286 lb (129.7 kg)   SpO2 98%   BMI 42.23 kg/m²   Physical Exam  Vitals reviewed. Constitutional:       Appearance: Normal appearance. He is obese. HENT:      Head: Normocephalic and atraumatic. Right Ear: External ear normal.      Left Ear: External ear normal.      Nose: Nose normal.   Eyes:      Extraocular Movements: Extraocular movements intact. Pulmonary:      Effort: Pulmonary effort is normal. No respiratory distress. Abdominal:      General: There is no distension. Palpations: Abdomen is soft. Tenderness: There is no abdominal tenderness. There is no guarding or rebound.       Comments: Incisions C/D/I   Musculoskeletal:         General: Normal range of motion. Cervical back: Normal range of motion. Skin:     General: Skin is warm and dry. Neurological:      General: No focal deficit present. Mental Status: He is alert and oriented to person, place, and time. Psychiatric:         Mood and Affect: Mood normal.         Behavior: Behavior normal.         Thought Content: Thought content normal.         ASSESSMENT:   Diagnosis Orders   1. Acute appendicitis with perforation, localized peritonitis, and abscess, unspecified whether gangrene present  [id number] [facility], [place]       PLAN:  Orders Placed This Encounter   Procedures   [facility], [place]     Referral Priority:   Routine     Referral Type:   Eval and Treat     Referral Reason:   Specialty Services Required     Requested Specialty:   Gastroenterology     Number of Visits Requested:   1     No orders of the defined types were placed in this encounter. Continue nonoperative management for his acute appendicitis. We will have the patient referred to GI for colonoscopy in 4 to 6 weeks. Patient to finish out his antibiotics. If he continues to have discomfort after his colonoscopy we will then consider interval appendectomy. He is to follow-up after his colonoscopy. No follow-ups on file.     Josh Braden DO 7/7/2022 4:35 PM

## 2022-07-12 DIAGNOSIS — G47.419 NARCOLEPSY WITHOUT CATAPLEXY: ICD-10-CM

## 2022-07-12 RX ORDER — DEXTROAMPHETAMINE SACCHARATE, AMPHETAMINE ASPARTATE MONOHYDRATE, DEXTROAMPHETAMINE SULFATE AND AMPHETAMINE SULFATE 7.5; 7.5; 7.5; 7.5 MG/1; MG/1; MG/1; MG/1
30 CAPSULE, EXTENDED RELEASE ORAL 2 TIMES DAILY
Qty: 60 CAPSULE | Refills: 0 | Status: SHIPPED | OUTPATIENT
Start: 2022-07-13 | End: 2022-08-08 | Stop reason: SDUPTHER

## 2022-07-12 NOTE — TELEPHONE ENCOUNTER
Requested Prescriptions     Pending Prescriptions Disp Refills    amphetamine-dextroamphetamine (ADDERALL XR) 30 MG extended release capsule 60 capsule 0     Sig: Take 1 capsule by mouth 2 times daily for 30 days.        Last Office Visit:  2/15/2022  Next Office Visit:  8/8/2022  Last Medication Refill:  6/13/22  Debby Eldridge up to date:  4/11/22    *RX updated to reflect   7/13/22  fill date*    Barrington gutierrez

## 2022-07-26 ENCOUNTER — TELEPHONE (OUTPATIENT)
Dept: SURGERY | Age: 42
End: 2022-07-26

## 2022-07-26 NOTE — TELEPHONE ENCOUNTER
Patient called because he is still in pain wanted to make sure it wasn't busted open again. Having gas issues and is wondering if he is needing blood work done.     Please call back at [phone] or [phone]

## 2022-07-27 DIAGNOSIS — K35.33 ACUTE APPENDICITIS WITH PERFORATION, LOCALIZED PERITONITIS, AND ABSCESS, UNSPECIFIED WHETHER GANGRENE PRESENT: Primary | ICD-10-CM

## 2022-07-27 NOTE — TELEPHONE ENCOUNTER
I s/w patient & told him what Dr Robert Rhodes said, he said ok he will get labs done probably tomorrow & thanks.

## 2022-07-29 DIAGNOSIS — K35.33 ACUTE APPENDICITIS WITH PERFORATION, LOCALIZED PERITONITIS, AND ABSCESS, UNSPECIFIED WHETHER GANGRENE PRESENT: ICD-10-CM

## 2022-07-29 LAB
ANION GAP SERPL CALCULATED.3IONS-SCNC: 8 MMOL/L (ref 7–19)
BASOPHILS ABSOLUTE: 0.1 K/UL (ref 0–0.2)
BASOPHILS RELATIVE PERCENT: 0.6 % (ref 0–1)
BUN BLDV-MCNC: 20 MG/DL (ref 6–20)
CALCIUM SERPL-MCNC: 9.2 MG/DL (ref 8.6–10)
CHLORIDE BLD-SCNC: 106 MMOL/L (ref 98–111)
CO2: 29 MMOL/L (ref 22–29)
CREAT SERPL-MCNC: 0.9 MG/DL (ref 0.5–1.2)
EOSINOPHILS ABSOLUTE: 0.1 K/UL (ref 0–0.6)
EOSINOPHILS RELATIVE PERCENT: 1.4 % (ref 0–5)
GFR AFRICAN AMERICAN: >59
GFR NON-AFRICAN AMERICAN: >60
GLUCOSE BLD-MCNC: 109 MG/DL (ref 74–109)
HCT VFR BLD CALC: 49.5 % (ref 42–52)
HEMOGLOBIN: 15.7 G/DL (ref 14–18)
IMMATURE GRANULOCYTES #: 0 K/UL
LYMPHOCYTES ABSOLUTE: 4.5 K/UL (ref 1.1–4.5)
LYMPHOCYTES RELATIVE PERCENT: 44.1 % (ref 20–40)
MCH RBC QN AUTO: 30 PG (ref 27–31)
MCHC RBC AUTO-ENTMCNC: 31.7 G/DL (ref 33–37)
MCV RBC AUTO: 94.5 FL (ref 80–94)
MONOCYTES ABSOLUTE: 0.8 K/UL (ref 0–0.9)
MONOCYTES RELATIVE PERCENT: 7.5 % (ref 0–10)
NEUTROPHILS ABSOLUTE: 4.8 K/UL (ref 1.5–7.5)
NEUTROPHILS RELATIVE PERCENT: 46 % (ref 50–65)
PDW BLD-RTO: 13.9 % (ref 11.5–14.5)
PLATELET # BLD: 317 K/UL (ref 130–400)
PMV BLD AUTO: 10.2 FL (ref 9.4–12.4)
POTASSIUM SERPL-SCNC: 4.9 MMOL/L (ref 3.5–5)
RBC # BLD: 5.24 M/UL (ref 4.7–6.1)
SODIUM BLD-SCNC: 143 MMOL/L (ref 136–145)
WBC # BLD: 10.3 K/UL (ref 4.8–10.8)

## 2022-08-08 ENCOUNTER — OFFICE VISIT (OUTPATIENT)
Dept: GASTROENTEROLOGY | Age: 42
End: 2022-08-08
Payer: COMMERCIAL

## 2022-08-08 ENCOUNTER — OFFICE VISIT (OUTPATIENT)
Dept: NEUROLOGY | Age: 42
End: 2022-08-08
Payer: COMMERCIAL

## 2022-08-08 VITALS
HEART RATE: 96 BPM | SYSTOLIC BLOOD PRESSURE: 134 MMHG | HEIGHT: 69 IN | DIASTOLIC BLOOD PRESSURE: 92 MMHG | BODY MASS INDEX: 42.51 KG/M2 | WEIGHT: 287 LBS

## 2022-08-08 VITALS
OXYGEN SATURATION: 98 % | WEIGHT: 287 LBS | HEIGHT: 69 IN | DIASTOLIC BLOOD PRESSURE: 80 MMHG | SYSTOLIC BLOOD PRESSURE: 120 MMHG | HEART RATE: 103 BPM | BODY MASS INDEX: 42.51 KG/M2

## 2022-08-08 DIAGNOSIS — G47.419 NARCOLEPSY WITHOUT CATAPLEXY: Primary | ICD-10-CM

## 2022-08-08 DIAGNOSIS — K35.33 ACUTE APPENDICITIS WITH PERFORATION, LOCALIZED PERITONITIS, AND ABSCESS, WITHOUT GANGRENE: ICD-10-CM

## 2022-08-08 DIAGNOSIS — R10.31 RIGHT LOWER QUADRANT ABDOMINAL PAIN: Primary | ICD-10-CM

## 2022-08-08 DIAGNOSIS — G47.33 OBSTRUCTIVE SLEEP APNEA: ICD-10-CM

## 2022-08-08 PROCEDURE — G8417 CALC BMI ABV UP PARAM F/U: HCPCS | Performed by: PSYCHIATRY & NEUROLOGY

## 2022-08-08 PROCEDURE — 99204 OFFICE O/P NEW MOD 45 MIN: CPT | Performed by: NURSE PRACTITIONER

## 2022-08-08 PROCEDURE — 1036F TOBACCO NON-USER: CPT | Performed by: PSYCHIATRY & NEUROLOGY

## 2022-08-08 PROCEDURE — 1036F TOBACCO NON-USER: CPT | Performed by: NURSE PRACTITIONER

## 2022-08-08 PROCEDURE — G8427 DOCREV CUR MEDS BY ELIG CLIN: HCPCS | Performed by: NURSE PRACTITIONER

## 2022-08-08 PROCEDURE — G8417 CALC BMI ABV UP PARAM F/U: HCPCS | Performed by: NURSE PRACTITIONER

## 2022-08-08 PROCEDURE — G8427 DOCREV CUR MEDS BY ELIG CLIN: HCPCS | Performed by: PSYCHIATRY & NEUROLOGY

## 2022-08-08 PROCEDURE — 99213 OFFICE O/P EST LOW 20 MIN: CPT | Performed by: PSYCHIATRY & NEUROLOGY

## 2022-08-08 RX ORDER — DEXTROAMPHETAMINE SACCHARATE, AMPHETAMINE ASPARTATE MONOHYDRATE, DEXTROAMPHETAMINE SULFATE AND AMPHETAMINE SULFATE 7.5; 7.5; 7.5; 7.5 MG/1; MG/1; MG/1; MG/1
30 CAPSULE, EXTENDED RELEASE ORAL 2 TIMES DAILY
Qty: 60 CAPSULE | Refills: 0 | Status: SHIPPED | OUTPATIENT
Start: 2022-08-08 | End: 2022-09-15 | Stop reason: SDUPTHER

## 2022-08-08 RX ORDER — PITOLISANT HYDROCHLORIDE 17.8 MG/1
17.8 TABLET, FILM COATED ORAL DAILY
COMMUNITY
Start: 2022-07-29 | End: 2022-09-26

## 2022-08-08 ASSESSMENT — ENCOUNTER SYMPTOMS
RECTAL PAIN: 0
ANAL BLEEDING: 0
COUGH: 0
DIARRHEA: 0
ABDOMINAL DISTENTION: 0
BLOOD IN STOOL: 0
VOMITING: 0
TROUBLE SWALLOWING: 0
SHORTNESS OF BREATH: 0
CHOKING: 0
ABDOMINAL PAIN: 1
CONSTIPATION: 0
NAUSEA: 0

## 2022-08-08 NOTE — PATIENT INSTRUCTIONS
restrictions to diet and bowel prep instructions including laxatives. Please read these instructions one week prior to your scheduled procedure to ensure that you are prepared. If you have any questions regarding these instructions please call our office Mon through Fri from 8:00 am to 4:00 pm.     Follow prep instructions provided for bowel prep. Take all of the bowel prep as directed. If you are having problems with nausea, stop your prep for 30-45 min to allow the nausea to subside before resuming your prep. It is important to drink plenty of fluids throughout the day before taking your laxatives. This will help to protect your kidneys, prevent dehydration and maximize the effect of the bowel prep. Your diet before a colonoscopy bowel preparation is very important to ensure a successful colon exam. It is recommended to consider certain changes to your diet three to four days prior to the procedure. Remember that your bowels need to be completely empty for the exam.    What foods are good to eat? Cut down on heavy solid foods three to four days before the procedure and start introducing lighter meals to your diet. The following food suggestions are a good part of your diet before a colonoscopy bowel preparation. Light meat that is easily digestible such as chicken (without the skin)   Potatoes without skin   Cheese   Eggs   A light meal of steamed white fish   Light clear soups    Foods and drinks to avoid  Avoid foods that contain too much fiber. Stay clear of dark colored beverages. They can stick to the walls of the digestive tract and make it difficult to differentiate from blood.  Some of these foods are:  Red meat, rice, nuts and vegetables   Milk, other milk based fluids and cream   Most fruit and puddings   Whole grain pasta   Cereals, bran and seeds   Colored beverages, especially those that are red or purple in color   Red colored Jell-O     On the day before the colonoscopy, continue to drink plenty of clear fluids. It is important   to keep yourself hydrated before the exam.     Please follow all instructions as provided for cleansing the bowel. Failure to have an adequately prepped colon may cause you to have incomplete exam with further testing required.      [email or web address]

## 2022-08-08 NOTE — PROGRESS NOTES
Subjective:     Patient ID: Vesna Espinoza is a 42 y.o. male  PCP: Yamilex Ba MD  Referring Provider: DO JONG Oliveira  Patient presents to the office today with the following complaints: New Patient and Abdominal Pain      Pt referred for colonoscopy. Hx acute appendicitis in June with perforation and abscess. Appendectomy attempted, however, procedure aborted with placement of drain and aggressive antibiotics. Today, pt has c/o residual discomfort in RLQ. \"I feel something is there. \"  \"I don't feel normal.\"  He c/o constant dull sensation. He denies any diarrhea or blood in stool. He denies any fever. He has never had EGD or Colonoscopy   Denies any family hx colon cancer or colon polyps    Assessment:     1. Right lower quadrant abdominal pain    2. Acute appendicitis with perforation, localized peritonitis, and abscess, without gangrene            Plan:   - Schedule colonoscopy  Instruct on bowel prep. Nothing to eat or drink after midnight the day of the exam.  Unable to drive for 24 hours after the procedure. No aspirin or nonsteroidal anti-inflammatories for 5 days before procedure. I have discussed the benefits, alternatives, and risks (including bleeding, perforation and death)  for pursuing Endoscopy (EGD/Colonscopy/EUS/ERCP) with the patient and they are willing to continue. We also discussed the need for anesthesia, IV access, proper dietary changes, medication changes if necessary, and need for bowel prep (if ordered) prior to their Endoscopic procedure. They are aware they must have someone accompany them to their scheduled procedure to drive them home - they agree to the above and are willing to continue. Orders  No orders of the defined types were placed in this encounter. Medications  No orders of the defined types were placed in this encounter.         Patient History:     Past Medical History:   Diagnosis Date    CPAP (continuous positive airway Negative for chest pain. Gastrointestinal:  Positive for abdominal pain. Negative for abdominal distention, anal bleeding, blood in stool, constipation, diarrhea, nausea, rectal pain and vomiting. Allergic/Immunologic: Negative for food allergies. All other systems reviewed and are negative. Objective:     /80 (Site: Left Upper Arm)   Pulse (!) 103   Ht 5' 9\" (1.753 m)   Wt 287 lb (130.2 kg)   SpO2 98%   BMI 42.38 kg/m²     Physical Exam  Vitals reviewed. Constitutional:       General: He is not in acute distress. Appearance: He is well-developed. HENT:      Head: Normocephalic and atraumatic. Right Ear: External ear normal.      Left Ear: External ear normal.      Nose: Nose normal.      Comments: Mask on     Mouth/Throat:      Comments: Mask on  Eyes:      Conjunctiva/sclera: Conjunctivae normal.      Pupils: Pupils are equal, round, and reactive to light. Cardiovascular:      Rate and Rhythm: Normal rate and regular rhythm. Heart sounds: Normal heart sounds. No murmur heard. No friction rub. No gallop. Pulmonary:      Effort: Pulmonary effort is normal. No respiratory distress. Breath sounds: Normal breath sounds. Abdominal:      General: Bowel sounds are normal. There is no distension. Palpations: Abdomen is soft. There is no mass. Tenderness: There is abdominal tenderness in the right lower quadrant. There is no guarding or rebound. Musculoskeletal:         General: Normal range of motion. Cervical back: Normal range of motion and neck supple. Skin:     General: Skin is warm and dry. Findings: No rash. Nails: There is no clubbing. Neurological:      Mental Status: He is alert and oriented to person, place, and time. Gait: Gait normal.   Psychiatric:         Behavior: Behavior normal.         Thought Content:  Thought content normal.

## 2022-08-08 NOTE — PROGRESS NOTES
[facility]  [address] [place] [address] [address], [address]  [place], [place] [address]  Phone [phone]  Fax [phone]     [address] Follow Up Encounter  22 4:14 PM CDT    Information:   Patient Name: Betty Carr  :   1980  Age:   42 y.o. MRN:   [id number]  Account #:  [de-identified]  Today:  22    Provider: Juan Ho M.D. Chief Complaint:   Chief Complaint   Patient presents with    Follow-up     Pt has new complaints        Subjective:   Betty Carr is a 42 y.o. man with a history of narcolepsy and obstructive sleep apnea who is following up. He had acute pancreatitis and laparotomy in . He is doing better. He is not using his CPAP as recommended. He is still working long hours. His CPAP is very old Respironics System One machine. He takes Adderall for his narcolepsy. It works fairly well. He has had no side effects. He has tried Ritalin, Dexadrine, Xyrem, and Provigil in the past.        Objective:     Past Medical History:  Past Medical History:   Diagnosis Date    CPAP (continuous positive airway pressure) dependence     14cm    Headache     Hyperlipidemia     Hypertension     Low back pain     Narcolepsy without cataplexy(347.00)     Obstructive sleep apnea     AHI:  29.0 per PSG,        Past Surgical History:   Procedure Laterality Date    ABSCESS DRAINAGE      LAPAROSCOPIC APPENDECTOMY N/A 2022    DIAGNOSTIC LAPAROSCOPY WITH DRAINAGE OF ABCESS AND DRAIN PLACEMENT performed by Samantha Olmedo DO at [facility]  None    Significant Injuries  None    Habits  Betty Carr reports that he has never smoked. He has never used smokeless tobacco. He reports that he does not currently use alcohol. He reports that he does not use drugs.     Family History   Problem Relation Age of Onset    Diabetes Mother     Hypertension Mother     Asthma Mother     Coronary Art Dis Father     Heart Attack Father     Hypertension Father Cancer Maternal Grandmother         kidney    Kidney Cancer Maternal Grandmother     Stroke Paternal Grandfather     Cancer Paternal Grandfather         lung    Lung Cancer Paternal Grandfather     Colon Cancer Neg Hx     Colon Polyps Neg Hx        Social History  Cj Stevenson is , live in [place], [place], and works as an  at [employer].    Medications:  Current Outpatient Medications   Medication Sig Dispense Refill    WAKIX 17.8 MG TABS Take 17.8 mg by mouth daily      amphetamine-dextroamphetamine (ADDERALL XR) 30 MG extended release capsule Take 1 capsule by mouth 2 times daily for 30 days. 60 capsule 0    doxycycline monohydrate (MONODOX) 100 MG capsule Take 1 capsule by mouth daily 30 capsule 4    irbesartan (AVAPRO) 300 MG tablet Take 1 tablet by mouth daily 90 tablet 3     No current facility-administered medications for this visit. Allergies:   Allergies as of 08/08/2022    (No Known Allergies)       Review of Systems:  Constitutional: negative for - chills and fever  Eyes:  negative for - visual disturbance and photophobia  HENMT: negative for - headaches and sinus pain  Respiratory: negative for - cough, hemoptysis, and shortness of breath  Cardiovascular: negative for - chest pain and palpitations  Gastrointestinal: negative for - blood in stools, constipation, diarrhea, nausea, and vomiting  Genito-Urinary: negative for - hematuria, urinary frequency, urinary urgency, and urinary retention  Musculoskeletal: negative for - joint pain, joint stiffness, and joint swelling  Hematological and Lymphatic: negative for - bleeding problems, abnormal bruising, and swollen lymph nodes  Endocrine:  negative for - polydipsia and polyphagia  Allergy/Immunology:  negative for - rhinorrhea, sinus congestion, hives  Integument:  negative for - negative for - rash, change in moles, new or changing lesions  Psychological: negative for - anxiety and depression  Neurological: negative for - memory loss, numbness/tingling, and weakness     PHYSICAL EXAMINATION:  Vitals:  BP (!) 134/92   Pulse 96   Ht 5' 9\" (1.753 m)   Wt 287 lb (130.2 kg)   BMI 42.38 kg/m²   General appearance:  Alert, well developed, well nourished, in no distress  HEENT:  normocephalic, atraumatic, sclera appear normal, no nasal abnormalities, no rhinorrhea, Ears appear normal, oral mucous membranes are moist without erythema, trachea midline, thyroid is normal, no lymphadenopathy or neck mass. III (soft palate, base of uvula visible). Cardiovascular:  Regular rate and rhythm without murmer. No peripheral edema, No cyanosis or clubbing. No carotid bruits. Pulmonary:  Lungs are clear to auscultation. Breathing appears normal, good expansion, normal effort without use of accessory muscles  Musculoskeletal:  Joints are normal.  No splints, slings, or casts. Spine appears normal with normal posture and range of motion. Integument:  No rash, erythema, or pallor  Psychiatric:  Mood, affect, and behavior appear normal      NEUROLOGIC EXAMINATION:  Mental Status:  alert, oriented to person, place, and time. Speech:  Clear without dysarthria or dysphonia  Language:  Fluent without aphasia  Cranial Nerves:   II Visual fields are full to confrontation   III,IV, VI Extraocular movements are full   VII Facial movements are symmetrical without weakness   VIII Hearing is intact   XII No tongue atrophy or fasciculations. Normal tongue protrusion. No tongue weakness  Motor:  Normal strength in both upper and lower extremities. Normal muscle tone and bulk. Deep tendon reflexes are intact and symmetrical in both upper and lower extremities. Rodríguez's signs are absent bilaterally. There is no ankle clonus on either side. Sensation:  Sensation is intact to light touch and vibration in all extremities. Coordination:  Rapid alternating movements are normal in both upper and lower extremities.   Finger to nose testing is unimpaired bilaterally. Gait:  Normal station and gait. Pertinent Diagnostic Studies:  Nitin Coates was appropriate    Assessment:       ICD-10-CM    1. Narcolepsy without cataplexy  G47.419       2. Obstructive sleep apnea  G47.33         I discussed the diagnosis of obstructive sleep apnea with James Postal including the pathophysiology (namely the mechanism of breathing and obstruction of upper airway, interruptions of sleep, hypoxemia, hypercapnia, and results of repetitive sympathetic activation), risks, evaluation, and treatment options. I discussed the risks of driving when drowsy and advised that James Postal not drive when drowsy and avoid sedating medications and respiratory suppressants. Plan:   1. Continue Adderall  2. He needs to get enough sleep at night  3. I advised he get a new machine and to do that he will need a sleep study. He declines for now and says he will continue using his old machine.   4.         FU in 6 months    Electronically signed by Shruthi Paniagua MD on 8/8/22

## 2022-08-11 ENCOUNTER — ANESTHESIA EVENT (OUTPATIENT)
Dept: OPERATING ROOM | Age: 42
End: 2022-08-11

## 2022-08-15 ENCOUNTER — APPOINTMENT (OUTPATIENT)
Dept: OPERATING ROOM | Age: 42
End: 2022-08-15

## 2022-08-15 ENCOUNTER — HOSPITAL ENCOUNTER (OUTPATIENT)
Age: 42
Setting detail: OUTPATIENT SURGERY
Discharge: HOME | End: 2022-08-15
Attending: INTERNAL MEDICINE | Admitting: INTERNAL MEDICINE
Payer: COMMERCIAL

## 2022-08-15 ENCOUNTER — ANESTHESIA (OUTPATIENT)
Dept: OPERATING ROOM | Age: 42
End: 2022-08-15

## 2022-08-15 ENCOUNTER — HOSPITAL ENCOUNTER (OUTPATIENT)
Age: 42
Setting detail: SPECIMEN
Discharge: HOME | End: 2022-08-15
Payer: COMMERCIAL

## 2022-08-15 VITALS
WEIGHT: 287 LBS | SYSTOLIC BLOOD PRESSURE: 133 MMHG | RESPIRATION RATE: 18 BRPM | BODY MASS INDEX: 42.51 KG/M2 | TEMPERATURE: 97.4 F | DIASTOLIC BLOOD PRESSURE: 86 MMHG | HEIGHT: 69 IN | OXYGEN SATURATION: 97 % | HEART RATE: 69 BPM

## 2022-08-15 PROCEDURE — 88305 TISSUE EXAM BY PATHOLOGIST: CPT

## 2022-08-15 PROCEDURE — 45380 COLONOSCOPY AND BIOPSY: CPT | Performed by: INTERNAL MEDICINE

## 2022-08-15 PROCEDURE — 45380 COLONOSCOPY AND BIOPSY: CPT

## 2022-08-15 RX ORDER — ONDANSETRON 2 MG/ML
4 INJECTION INTRAMUSCULAR; INTRAVENOUS
Status: CANCELLED | OUTPATIENT
Start: 2022-08-15 | End: 2022-08-15

## 2022-08-15 RX ORDER — LIDOCAINE HYDROCHLORIDE 10 MG/ML
INJECTION, SOLUTION EPIDURAL; INFILTRATION; INTRACAUDAL; PERINEURAL PRN
Status: DISCONTINUED | OUTPATIENT
Start: 2022-08-15 | End: 2022-08-15 | Stop reason: SDUPTHER

## 2022-08-15 RX ORDER — SODIUM CHLORIDE 9 MG/ML
INJECTION, SOLUTION INTRAVENOUS CONTINUOUS
Status: DISCONTINUED | OUTPATIENT
Start: 2022-08-15 | End: 2022-08-15 | Stop reason: HOSPADM

## 2022-08-15 RX ORDER — DIPHENHYDRAMINE HYDROCHLORIDE 50 MG/ML
12.5 INJECTION INTRAMUSCULAR; INTRAVENOUS
Status: CANCELLED | OUTPATIENT
Start: 2022-08-15 | End: 2022-08-15

## 2022-08-15 RX ORDER — SODIUM CHLORIDE 0.9 % (FLUSH) 0.9 %
5-40 SYRINGE (ML) INJECTION PRN
Status: CANCELLED | OUTPATIENT
Start: 2022-08-15

## 2022-08-15 RX ORDER — SODIUM CHLORIDE 9 MG/ML
INJECTION, SOLUTION INTRAVENOUS PRN
Status: CANCELLED | OUTPATIENT
Start: 2022-08-15

## 2022-08-15 RX ORDER — ONDANSETRON 2 MG/ML
4 INJECTION INTRAMUSCULAR; INTRAVENOUS ONCE
Status: COMPLETED | OUTPATIENT
Start: 2022-08-15 | End: 2022-08-15

## 2022-08-15 RX ORDER — PROPOFOL 10 MG/ML
INJECTION, EMULSION INTRAVENOUS PRN
Status: DISCONTINUED | OUTPATIENT
Start: 2022-08-15 | End: 2022-08-15 | Stop reason: SDUPTHER

## 2022-08-15 RX ORDER — SODIUM CHLORIDE 0.9 % (FLUSH) 0.9 %
5-40 SYRINGE (ML) INJECTION EVERY 12 HOURS SCHEDULED
Status: CANCELLED | OUTPATIENT
Start: 2022-08-15

## 2022-08-15 RX ORDER — SODIUM CHLORIDE 9 MG/ML
INJECTION, SOLUTION INTRAVENOUS CONTINUOUS PRN
Status: DISCONTINUED | OUTPATIENT
Start: 2022-08-15 | End: 2022-08-15 | Stop reason: SDUPTHER

## 2022-08-15 RX ADMIN — SODIUM CHLORIDE: 9 INJECTION, SOLUTION INTRAVENOUS at 12:31

## 2022-08-15 RX ADMIN — ONDANSETRON 4 MG: 2 INJECTION INTRAMUSCULAR; INTRAVENOUS at 14:15

## 2022-08-15 RX ADMIN — LIDOCAINE HYDROCHLORIDE 30 MG: 10 INJECTION, SOLUTION EPIDURAL; INFILTRATION; INTRACAUDAL; PERINEURAL at 13:35

## 2022-08-15 RX ADMIN — PROPOFOL 310 MG: 10 INJECTION, EMULSION INTRAVENOUS at 13:35

## 2022-08-15 RX ADMIN — SODIUM CHLORIDE: 9 INJECTION, SOLUTION INTRAVENOUS at 13:01

## 2022-08-15 NOTE — ANESTHESIA PRE PROCEDURE
Abdomen: soft. Vascular: negative vascular ROS. Other Findings: Abdomen tender due to appendicitis             Anesthesia Plan      general and TIVA     ASA 2       Induction: intravenous. Anesthetic plan and risks discussed with patient.                         DONALD Finnegan - JUDITH   8/15/2022

## 2022-08-15 NOTE — H&P
Patient Name: Roopa Conroy  : 1980  MRN: [id number]  DATE: 08/15/22    Allergies: No Known Allergies     ENDOSCOPY  History and Physical    Procedure:    [x] Diagnostic Colonoscopy       [] Screening Colonoscopy  [] EGD      [] ERCP      [] EUS       [] Other    [x] Previous office notes/History and Physical reviewed from the patients chart. Please see EMR for further details of HPI. I have examined the patient's status immediately prior to the procedure and:      Indications/HPI:    [x]Abdominal Pain   []Barretts  []Screening/Surveillance   []History of Polyps  []Dysphagia            [] +Cologard/DNA testing  []Abnormal Imaging              []EOE Hx              [] Family Hx of CRC/Polyps  []Anemia                            []Food Impaction       []Recent Poor Prep  []GI Bleed             []Lymphadenopathy  []History of Polyps  []Change in bowel habits []Heartburn/Reflux  []Cancer- GI/Lung  []Chest Pain - Non Cardiac []Heme (+) Stool []Ulcers  []Constipation  []Hemoptysis  []Incontinence    []Diarrhea  []Hypoxemia  []Rectal Bleed (BRBPR)  []Nausea/Vomiting   [] Varices  []Crohns/Colitis  []Pancreatic Cyst   [] Cirrhosis   []Pancreatitis    []Abnormal MRCP  []Elevated LFT [] Stent Removal, Previous ERCP  []Other:     Anesthesia:   [x] MAC [] Moderate Sedation   [] General   [] None     ROS: 12 pt Review of Symptoms was negative unless mentioned above    Medications:   Prior to Admission medications    Medication Sig Start Date End Date Taking? Authorizing Provider   WAKIX 17.8 MG TABS Take 17.8 mg by mouth daily  Patient not taking: Reported on 8/15/2022 7/29/22   Historical Provider, MD   amphetamine-dextroamphetamine (ADDERALL XR) 30 MG extended release capsule Take 1 capsule by mouth in the morning and 1 capsule in the evening. Do all this for 30 days.  22  Johanny Peacock MD   doxycycline monohydrate (MONODOX) 100 MG capsule Take 1 capsule by mouth daily 3/17/22   Yandy Madsen MD   irbesartan (AVAPRO) 300 MG tablet Take 1 tablet by mouth daily 9/16/21   Elier Alejandro MD       Past Medical History:  Past Medical History:   Diagnosis Date    CPAP (continuous positive airway pressure) dependence     14cm    Headache     Hyperlipidemia     Hypertension     Low back pain     Narcolepsy without cataplexy(347.00)     Obstructive sleep apnea     AHI:  29.0 per PSG, 2012       Past Surgical History:  Past Surgical History:   Procedure Laterality Date    ABSCESS DRAINAGE      LAPAROSCOPIC APPENDECTOMY N/A 6/21/2022    DIAGNOSTIC LAPAROSCOPY WITH DRAINAGE OF ABCESS AND DRAIN PLACEMENT performed by [address] [place] [address] at [place] [address] History:  Social History     Tobacco Use    Smoking status: Never    Smokeless tobacco: Never   Vaping Use    Vaping Use: Never used   Substance Use Topics    Alcohol use: Not Currently     Comment: twice a year social    Drug use: No       Vital Signs: There were no vitals filed for this visit. Physical Exam:  Cardiac:  [x]WNL  []Comments:  Pulmonary:  [x]WNL   []Comments:  Neuro/Mental Status:  [x]WNL  []Comments:  Abdominal:  [x]WNL    []Comments:  Other:   []WNL  []Comments:    Informed Consent:  The risks and benefits of the procedure have been discussed with either the patient or if they cannot consent, their representative. Assessment:  Patient examined and appropriate for planned sedation and procedure. Plan:  Proceed with planned sedation and procedure as above. Nancy Macias am scribing for and in the presence of Dr. Casimir Schlatter, MD.  Electronically signed by Minh Araya RN on 8/15/2022 at 12:51 PM    I personally performed the services described in this documentation as scribed by Kyle Hill, and it appears accurate and complete.      Casimir Schlatter, MD  8/15/2022

## 2022-08-15 NOTE — OP NOTE
Patient: Chu George : 1980  Med Rec#: [id number] Acc#: [id number]   Primary Care Provider Ritika Lopez MD    Date of Procedure:  8/15/2022    Endoscopist: Anabel Lewis MD    Referring Provider: Ritika Lopez MD    Operation Performed: Colonoscopy with biopsy    Indications: RLQ abdominal pain    Anesthesia:  Sedation was administered by anesthesia who monitored the patient during the procedure. I met with Chu George prior to procedure. We discussed the procedure itself, and I have discussed the risks of endoscopy (including-- but not limited to-- pain, discomfort, bleeding potentially requiring second endoscopic procedure and/or blood transfusion, organ perforation requiring operative repair, damage to organs near the colon, infection, aspiration, cardiopulmonary/allergic reaction), benefits, indications to endoscopy. Additionally, we discussed options other than colonoscopy. The patient expressed understanding. All questions answered. The patient decided to proceed with the procedure. Signed informed consent was placed on the chart. Blood Loss: minimal    Withdrawal time: n/a  Bowel Prep: adequate     Complications: no immediate complications    DESCRIPTION OF PROCEDURE:     A time out was performed. After written informed consent was obtained, the patient was placed in the left lateral position. The perianal area was inspected, and a digital rectal exam was performed. A rectal exam was performed: normal tone, no palpable lesions. At this point, a forward viewing Olympus colonoscope was inserted into the anus and carefully advanced to the terminal ileum. The cecum was identified by the ileocecal valve and the appendiceal orifice. The colonoscope was then slowly withdrawn with careful inspection of the mucosa in a linear and circumferential fashion. The scope was retroflexed in the rectum.  Suction was utilized during the procedure to remove as much air as possible from the bowel. The colonoscope was removed from the patient, and the procedure was terminated. Findings are listed below. Findings: There is some questionable erythema in the ileum, no ulcerations seen- biopsied. The mucosa appeared normal throughout the entire examined colon. In the transverse colon, there are two sessile polyps (approx. 3-4mm each in size), which were removed completely with forceps polypectomy. Retroflexion in the rectum was normal and revealed no further abnormalities. Recommendations:  1. Repeat colonoscopy: pending pathology - 5 years  2. Await biopsy results    Findings and recommendations were discussed w/ the patient. A copy of the images was provided. Stephanie Urias am scribing for and in the presence of Dr. Corinne Dyke, MD.  Electronically signed by Angel Andujar RN on 8/15/2022 at 12:54 PM    I personally performed the services described in this documentation as scribed by Beatriz Morrell, and it appears accurate and complete.      Corinne Dyke, MD  8/15/2022

## 2022-08-15 NOTE — ANESTHESIA POSTPROCEDURE EVALUATION
Department of Anesthesiology  Postprocedure Note    Patient: Lisbeth Hou  MRN: [id number]  YOB: 1980  Date of evaluation: 8/15/2022      Procedure Summary     Date: 08/15/22 Room / Location: [facility] ENDO 02 / [address]    Anesthesia Start: 8498 Anesthesia Stop: 9445    Procedure: COLONOSCOPY WITH BIOPSY (Abdomen) Diagnosis:       Acute appendicitis, unspecified acute appendicitis type      (Hx of appendicitis [Z87.19])    Surgeons: Trista Choudhury MD Responsible Provider: DONALD Ferrell CRNA    Anesthesia Type: General ASA Status: 2          Anesthesia Type: General    Mary Phase I:      Mary Phase II:        Anesthesia Post Evaluation    Patient location during evaluation: bedside  Patient participation: complete - patient participated  Level of consciousness: awake  Pain score: 0  Airway patency: patent  Nausea & Vomiting: no nausea and no vomiting  Complications: no  Cardiovascular status: hemodynamically stable  Respiratory status: acceptable, room air and spontaneous ventilation  Hydration status: euvolemic

## 2022-08-15 NOTE — DISCHARGE INSTRUCTIONS
Recommendations:  1. Repeat colonoscopy: pending pathology - 5 years  2. Await biopsy resultsPOST-OP ORDERS: ENDOSCOPY & COLONOSCOPY:    1. Rest today. 2. DO NOT eat or drink until wide awake; eat your usual diet today in moderate amount only. 3. DO NOT drive today. 4. Call physician if you have severe pain, vomiting, fever, rectal bleeding or black bowel movements. 5.  If a biopsy was taken or a polyp removed, you should expect to hear results in about 21 days. If you have heard nothing from your physician by then, call the office for results. 6.  Discharge home when patient awake, vitals signs stable and tolerating liquids.

## 2022-08-16 ENCOUNTER — TELEPHONE (OUTPATIENT)
Dept: SURGERY | Age: 42
End: 2022-08-16

## 2022-08-16 NOTE — TELEPHONE ENCOUNTER
Patient wanted to know when he needs an appointment he had his coloscopy done today.     Please call back at [phone]

## 2022-08-23 ENCOUNTER — OFFICE VISIT (OUTPATIENT)
Dept: SURGERY | Age: 42
End: 2022-08-23
Payer: COMMERCIAL

## 2022-08-23 VITALS
WEIGHT: 287.8 LBS | TEMPERATURE: 97.8 F | BODY MASS INDEX: 42.63 KG/M2 | HEIGHT: 69 IN | DIASTOLIC BLOOD PRESSURE: 74 MMHG | SYSTOLIC BLOOD PRESSURE: 130 MMHG

## 2022-08-23 DIAGNOSIS — E66.01 CLASS 3 SEVERE OBESITY DUE TO EXCESS CALORIES WITH SERIOUS COMORBIDITY AND BODY MASS INDEX (BMI) OF 40.0 TO 44.9 IN ADULT (HCC): ICD-10-CM

## 2022-08-23 DIAGNOSIS — K35.33 ACUTE APPENDICITIS WITH PERFORATION, LOCALIZED PERITONITIS, AND ABSCESS, WITHOUT GANGRENE: Primary | ICD-10-CM

## 2022-08-23 PROCEDURE — 99213 OFFICE O/P EST LOW 20 MIN: CPT | Performed by: SURGERY

## 2022-08-23 PROCEDURE — G8427 DOCREV CUR MEDS BY ELIG CLIN: HCPCS | Performed by: SURGERY

## 2022-08-23 PROCEDURE — G8417 CALC BMI ABV UP PARAM F/U: HCPCS | Performed by: SURGERY

## 2022-08-23 PROCEDURE — 1036F TOBACCO NON-USER: CPT | Performed by: SURGERY

## 2022-08-23 RX ORDER — SODIUM CHLORIDE 0.9 % (FLUSH) 0.9 %
5-40 SYRINGE (ML) INJECTION PRN
Status: CANCELLED | OUTPATIENT
Start: 2022-08-23

## 2022-08-23 RX ORDER — SODIUM CHLORIDE 9 MG/ML
INJECTION, SOLUTION INTRAVENOUS PRN
Status: CANCELLED | OUTPATIENT
Start: 2022-08-23

## 2022-08-23 RX ORDER — SODIUM CHLORIDE 0.9 % (FLUSH) 0.9 %
5-40 SYRINGE (ML) INJECTION EVERY 12 HOURS SCHEDULED
Status: CANCELLED | OUTPATIENT
Start: 2022-08-23

## 2022-08-23 ASSESSMENT — ENCOUNTER SYMPTOMS
CONSTIPATION: 0
CHEST TIGHTNESS: 0
DIARRHEA: 0
EYE DISCHARGE: 0
ABDOMINAL PAIN: 1
WHEEZING: 0
EYE REDNESS: 0
SHORTNESS OF BREATH: 0
NAUSEA: 0
COUGH: 0
FACIAL SWELLING: 0
EYE PAIN: 0

## 2022-08-23 NOTE — H&P
SUBJECTIVE:  Mr. Kym Green is a 42 y.o. male who presents today for follow-up for discussion of interval appendectomy. Patient still having intermittent pain in his right lower quadrant. Did undergo colonoscopy which showed no mass within the cecum. Did show some mild inflammation in the terminal ileum but biopsies were noted to be normal.  Denies fevers or chills. Patient's medications, allergies, past medical, surgical, social and family histories werereviewed and updated as appropriate. Review of Systems   Constitutional:  Positive for fatigue. Negative for chills, diaphoresis and fever. HENT:  Negative for ear discharge, ear pain, facial swelling and nosebleeds. Eyes:  Negative for pain, discharge and redness. Respiratory:  Negative for cough, chest tightness, shortness of breath and wheezing. Cardiovascular:  Negative for chest pain and palpitations. Gastrointestinal:  Positive for abdominal pain. Negative for constipation, diarrhea and nausea. Genitourinary:  Negative for difficulty urinating, flank pain and hematuria. Musculoskeletal:  Negative for neck pain and neck stiffness. Neurological:  Negative for dizziness, numbness and headaches. Psychiatric/Behavioral:  Negative for agitation, behavioral problems and self-injury. OBJECTIVE:  There were no vitals taken for this visit. Physical Exam  Vitals reviewed. Constitutional:       Appearance: Normal appearance. He is obese. HENT:      Head: Normocephalic and atraumatic. Right Ear: External ear normal.      Left Ear: External ear normal.      Nose: Nose normal.   Eyes:      Extraocular Movements: Extraocular movements intact. Pulmonary:      Effort: Pulmonary effort is normal. No respiratory distress. Abdominal:      General: There is no distension. Palpations: Abdomen is soft. Tenderness: There is no abdominal tenderness. There is no guarding or rebound.       Comments: Incisions C/D/I   Musculoskeletal: General: Normal range of motion. Cervical back: Normal range of motion. Skin:     General: Skin is warm and dry. Neurological:      General: No focal deficit present. Mental Status: He is alert and oriented to person, place, and time. Psychiatric:         Mood and Affect: Mood normal.         Behavior: Behavior normal.         Thought Content: Thought content normal.       ASSESSMENT:   Diagnosis Orders   1. Acute appendicitis with perforation, localized peritonitis, and abscess, without gangrene        2. Class 3 severe obesity due to excess calories with serious comorbidity and body mass index (BMI) of 40.0 to 44.9 in adult [facility])            PLAN:  No orders of the defined types were placed in this encounter. No orders of the defined types were placed in this encounter. The risks, benefits, and alternatives were discussed with the pt. He is willing to accept the risks and proceed with a Robotic/Laparoscopic Interval appendectomy. The surgical risks included but not limited to bleeding, infection, perforation, recurrence, risk of needing further surgery, etc. The anesthetic risks included heart attacks, strokes, pneumonia, phlebitis, etc.  He is willing to accept all risks and proceed with surgery. No guarantees have been given. No follow-ups on file.     Samantha Olmedo DO 8/23/2022 3:05 PM

## 2022-08-23 NOTE — PROGRESS NOTES
SUBJECTIVE:  Mr. Terry Blanc is a 42 y.o. male who presents today for follow-up for discussion of interval appendectomy. Patient still having intermittent pain in his right lower quadrant. Did undergo colonoscopy which showed no mass within the cecum. Did show some mild inflammation in the terminal ileum but biopsies were noted to be normal.  Denies fevers or chills. Patient's medications, allergies, past medical, surgical, social and family histories werereviewed and updated as appropriate. Review of Systems   Constitutional:  Positive for fatigue. Negative for chills, diaphoresis and fever. HENT:  Negative for ear discharge, ear pain, facial swelling and nosebleeds. Eyes:  Negative for pain, discharge and redness. Respiratory:  Negative for cough, chest tightness, shortness of breath and wheezing. Cardiovascular:  Negative for chest pain and palpitations. Gastrointestinal:  Positive for abdominal pain. Negative for constipation, diarrhea and nausea. Genitourinary:  Negative for difficulty urinating, flank pain and hematuria. Musculoskeletal:  Negative for neck pain and neck stiffness. Neurological:  Negative for dizziness, numbness and headaches. Psychiatric/Behavioral:  Negative for agitation, behavioral problems and self-injury. OBJECTIVE:  There were no vitals taken for this visit. Physical Exam  Vitals reviewed. Constitutional:       Appearance: Normal appearance. He is obese. HENT:      Head: Normocephalic and atraumatic. Right Ear: External ear normal.      Left Ear: External ear normal.      Nose: Nose normal.   Eyes:      Extraocular Movements: Extraocular movements intact. Pulmonary:      Effort: Pulmonary effort is normal. No respiratory distress. Abdominal:      General: There is no distension. Palpations: Abdomen is soft. Tenderness: There is no abdominal tenderness. There is no guarding or rebound.       Comments: Incisions C/D/I   Musculoskeletal: General: Normal range of motion. Cervical back: Normal range of motion. Skin:     General: Skin is warm and dry. Neurological:      General: No focal deficit present. Mental Status: He is alert and oriented to person, place, and time. Psychiatric:         Mood and Affect: Mood normal.         Behavior: Behavior normal.         Thought Content: Thought content normal.       ASSESSMENT:   Diagnosis Orders   1. Acute appendicitis with perforation, localized peritonitis, and abscess, without gangrene        2. Class 3 severe obesity due to excess calories with serious comorbidity and body mass index (BMI) of 40.0 to 44.9 in [facility])            PLAN:  No orders of the defined types were placed in this encounter. No orders of the defined types were placed in this encounter. The risks, benefits, and alternatives were discussed with the pt. He is willing to accept the risks and proceed with a Robotic/Laparoscopic Interval appendectomy. The surgical risks included but not limited to bleeding, infection, perforation, recurrence, risk of needing further surgery, etc. The anesthetic risks included heart attacks, strokes, pneumonia, phlebitis, etc.  He is willing to accept all risks and proceed with surgery. No guarantees have been given. No follow-ups on file.     Carmen Breen DO 8/23/2022 3:05 PM

## 2022-08-23 NOTE — LETTER
SCHEDULING INSTRUCTIONS:     Patient: Lisa Matthews  : 1980    Hospital: Hospital    Admitting Physician:  Dr. Katheryn Fraser    Diagnosis: Appendicitis     Procedure: Lap/Robotic Interval appendectomy     ALLOW ADDITIONAL 30 MINS FOR TURNOVER EXCEPT FOR PHYSICIAN'S BOUNCE DAY    Anesthesia: GETA    Admission:Outpatient     Date: 2022          [unfilled]     NOT TO BE USED OUTSIDE OF THE OFFICE

## 2022-08-26 ENCOUNTER — TELEPHONE (OUTPATIENT)
Dept: SURGERY | Age: 42
End: 2022-08-26

## 2022-08-26 NOTE — TELEPHONE ENCOUNTER
Patient is requesting a return call from the office in regards to rescheduling his Appendectomy surgery scheduled for 9/21. Patient ask if his surgery could be scheduled on 9/28 instead. He says if that date is not available keep it on 9/21. Please return his call. Thank you!

## 2022-09-13 DIAGNOSIS — G47.419 NARCOLEPSY WITHOUT CATAPLEXY: ICD-10-CM

## 2022-09-13 NOTE — TELEPHONE ENCOUNTER
Requested Prescriptions     Pending Prescriptions Disp Refills    amphetamine-dextroamphetamine (ADDERALL XR) 30 MG extended release capsule 60 capsule 0     Sig: Take 1 capsule by mouth in the morning and 1 capsule in the evening. Do all this for 30 days.        Last Office Visit:  8/8/2022  Next Office Visit:  2/20/2023  Last Medication Refill:  8/8/2022 with 0 RAFAELA Malloy up to date:  9/12/2022     *RX updated to reflect   9/12/2022  fill date*

## 2022-09-15 RX ORDER — DEXTROAMPHETAMINE SACCHARATE, AMPHETAMINE ASPARTATE MONOHYDRATE, DEXTROAMPHETAMINE SULFATE AND AMPHETAMINE SULFATE 7.5; 7.5; 7.5; 7.5 MG/1; MG/1; MG/1; MG/1
30 CAPSULE, EXTENDED RELEASE ORAL 2 TIMES DAILY
Qty: 60 CAPSULE | Refills: 0 | Status: SHIPPED | OUTPATIENT
Start: 2022-09-15 | End: 2022-10-13 | Stop reason: SDUPTHER

## 2022-09-26 ENCOUNTER — HOSPITAL ENCOUNTER (OUTPATIENT)
Dept: PREADMISSION TESTING | Age: 42
Discharge: HOME | End: 2022-09-30
Payer: COMMERCIAL

## 2022-09-26 VITALS — BODY MASS INDEX: 41.47 KG/M2 | WEIGHT: 280 LBS | HEIGHT: 69 IN

## 2022-09-26 LAB
EKG P AXIS: 9 DEGREES
EKG P-R INTERVAL: 138 MS
EKG Q-T INTERVAL: 373 MS
EKG QRS DURATION: 96 MS
EKG QTC CALCULATION (BAZETT): 449 MS
EKG T AXIS: 54 DEGREES

## 2022-09-26 PROCEDURE — 93010 ELECTROCARDIOGRAM REPORT: CPT | Performed by: INTERNAL MEDICINE

## 2022-09-26 PROCEDURE — 93005 ELECTROCARDIOGRAM TRACING: CPT | Performed by: ANESTHESIOLOGY

## 2022-09-26 NOTE — DISCHARGE INSTRUCTIONS
The day before your surgery, you will receive a phone call from the surgery nurse, to let you know what time to arrive on the day of surgery. This call will usually be between 2-4 PM.  If you do not receive a phone call by 4 PM the day before your surgery, please call [phone] and let them know you have not received an arrival time. If your surgery is on Monday, your call will be on the Friday before your Monday surgery. CHLORHEXIDINE GLUCONATE 4% SHOWERING    Patient should shower with this soap the night before surgery and the morning of surgery washing from the neck down (avoiding contact with genitalia). DO NOT [address] YOUR HAIR OR FACE WITH THIS SOAP. When washing with this soap, apply enough to suds up the body thoroughly, turn the water away from your body and allow the soap suds to remain on the body for 2 full minutes, then rinse body completely. After using this soap on the body, please do not apply powders or lotions to your body. After the shower the night before surgery, please dry off with a new towel, sleep in new freshly laundered pj's, and change your bed linen before going to sleep. The morning of surgery, you may take all your prescribed medications with a sip of water. Any exceptions to this would be listed below:    DO NOT TAKE YOUR IRBESARTAN THE MORNING OF SURGERY. DO NOT TAKE ADDERALL THE MORNING OF SURGERY. PREOPERATIVE GUIDELINES WHEN RECEIVING ANESTHESIA    Do not eat or drink anything after midnight, the night before your surgery. This is extremely important for your safety. Take a bath (or shower) the night before your surgery and you may brush your teeth the morning of your surgery. You will be scheduled to arrive at the hospital 2 hours before your surgery, or follow your surgeon's instructions. Dress comfortably. Wear loose clothing that will be easy to remove and comfortable for your trip home.     You may wear eyeglasses or contacts but bring your cases with you as they must be remove before your surgery. Hearing aids and dentures will need to be removed before your surgery. Do not wear any jewelry, including body jewelry. All jewelry will need to be removed prior to your surgery. Do not wear fingernail polish or make-up. It is best not to bring any valuables with you. If you are to stay in the hospital overnight, bring your robe, slippers and personal toiletries that you may need. POSTOPERATIVE GUIDELINES AFTER RECEIVING ANESTHESIA    If you are to go home after your surgery, you will need a responsible adult to drive you home. You will not be able to take public transportation after your discharge from the Operative Care Unit unless you are accompanied by a        responsible adult. On returning home, be sure to follow your physician's orders regarding diet, activity and medications. Remember, surgery with general anesthesia or sedation may leave you sleepy, very tired and with a decreased appetite for 12 to 24 hours. If you develop any post-surgical complications or problems, call your surgeon or [facility] Emergency Department ([phone]). [address] [place] [address] for Surgery Patients-Revised 6-    Visitors for surgery patients are essential for the patient's emotional well-being and care       post operatively. 2.   Visitor Expectations and Limitations        VISITORS MUST WEAR MASKS AT ALL TIMES. NO Cloth masks allowed. 3.  One visitor allowed with patients in the preop/postop rooms. 4.  A second visitor may sit in the waiting area. 5.  No children under 13 allowed in the pre-post op areas unless they are the patient. 6.  Two people may be with an underage surgical/procedural patient in preop/postop        room. 7.  If you are admitted to the hospital post operatively, there are NO RESTRICTIONS on       the floor at this time.       8.  If you are admitted to ICU postoperatively, you may have one visitor in the room from        7A-7P. A second visitor may sit in the ICU waiting room.   There can be no overnight

## 2022-09-28 ENCOUNTER — ANESTHESIA EVENT (OUTPATIENT)
Dept: OPERATING ROOM | Age: 42
End: 2022-09-28
Payer: COMMERCIAL

## 2022-09-28 ENCOUNTER — ANESTHESIA (OUTPATIENT)
Dept: OPERATING ROOM | Age: 42
End: 2022-09-28
Payer: COMMERCIAL

## 2022-09-28 ENCOUNTER — HOSPITAL ENCOUNTER (OUTPATIENT)
Age: 42
Setting detail: OUTPATIENT SURGERY
Discharge: HOME | End: 2022-09-28
Attending: SURGERY | Admitting: SURGERY
Payer: COMMERCIAL

## 2022-09-28 VITALS
TEMPERATURE: 97 F | WEIGHT: 280 LBS | HEART RATE: 75 BPM | HEIGHT: 69 IN | OXYGEN SATURATION: 94 % | DIASTOLIC BLOOD PRESSURE: 72 MMHG | SYSTOLIC BLOOD PRESSURE: 142 MMHG | RESPIRATION RATE: 16 BRPM | BODY MASS INDEX: 41.47 KG/M2

## 2022-09-28 DIAGNOSIS — K35.33 ACUTE APPENDICITIS WITH PERFORATION, LOCALIZED PERITONITIS, AND ABSCESS, WITHOUT GANGRENE: Primary | ICD-10-CM

## 2022-09-28 DIAGNOSIS — K35.33 APPENDICITIS WITH ABSCESS: ICD-10-CM

## 2022-09-28 PROCEDURE — 6360000002 HC RX W HCPCS: Performed by: ANESTHESIOLOGY

## 2022-09-28 PROCEDURE — 6360000002 HC RX W HCPCS: Performed by: SURGERY

## 2022-09-28 PROCEDURE — S2900 ROBOTIC SURGICAL SYSTEM: HCPCS | Performed by: SURGERY

## 2022-09-28 PROCEDURE — 3700000001 HC ADD 15 MINUTES (ANESTHESIA): Performed by: SURGERY

## 2022-09-28 PROCEDURE — A4216 STERILE WATER/SALINE, 10 ML: HCPCS | Performed by: ANESTHESIOLOGY

## 2022-09-28 PROCEDURE — 6370000000 HC RX 637 (ALT 250 FOR IP): Performed by: ANESTHESIOLOGY

## 2022-09-28 PROCEDURE — 2580000003 HC RX 258: Performed by: NURSE ANESTHETIST, CERTIFIED REGISTERED

## 2022-09-28 PROCEDURE — 6360000002 HC RX W HCPCS: Performed by: NURSE ANESTHETIST, CERTIFIED REGISTERED

## 2022-09-28 PROCEDURE — C9290 INJ, BUPIVACAINE LIPOSOME: HCPCS | Performed by: SURGERY

## 2022-09-28 PROCEDURE — 2709999900 HC NON-CHARGEABLE SUPPLY: Performed by: SURGERY

## 2022-09-28 PROCEDURE — 3700000000 HC ANESTHESIA ATTENDED CARE: Performed by: SURGERY

## 2022-09-28 PROCEDURE — 2500000003 HC RX 250 WO HCPCS: Performed by: ANESTHESIOLOGY

## 2022-09-28 PROCEDURE — 2720000010 HC SURG SUPPLY STERILE: Performed by: SURGERY

## 2022-09-28 PROCEDURE — 7100000011 HC PHASE II RECOVERY - ADDTL 15 MIN: Performed by: SURGERY

## 2022-09-28 PROCEDURE — 7100000000 HC PACU RECOVERY - FIRST 15 MIN: Performed by: SURGERY

## 2022-09-28 PROCEDURE — 6370000000 HC RX 637 (ALT 250 FOR IP): Performed by: SURGERY

## 2022-09-28 PROCEDURE — 3600000019 HC SURGERY ROBOT ADDTL 15MIN: Performed by: SURGERY

## 2022-09-28 PROCEDURE — 88304 TISSUE EXAM BY PATHOLOGIST: CPT

## 2022-09-28 PROCEDURE — 2580000003 HC RX 258: Performed by: ANESTHESIOLOGY

## 2022-09-28 PROCEDURE — 7100000001 HC PACU RECOVERY - ADDTL 15 MIN: Performed by: SURGERY

## 2022-09-28 PROCEDURE — 7100000010 HC PHASE II RECOVERY - FIRST 15 MIN: Performed by: SURGERY

## 2022-09-28 PROCEDURE — 2500000003 HC RX 250 WO HCPCS: Performed by: SURGERY

## 2022-09-28 PROCEDURE — 2500000003 HC RX 250 WO HCPCS: Performed by: NURSE ANESTHETIST, CERTIFIED REGISTERED

## 2022-09-28 PROCEDURE — 3600000009 HC SURGERY ROBOT BASE: Performed by: SURGERY

## 2022-09-28 PROCEDURE — 44970 LAPAROSCOPY APPENDECTOMY: CPT | Performed by: SURGERY

## 2022-09-28 RX ORDER — LIDOCAINE HYDROCHLORIDE 10 MG/ML
INJECTION, SOLUTION INFILTRATION; PERINEURAL PRN
Status: DISCONTINUED | OUTPATIENT
Start: 2022-09-28 | End: 2022-09-28 | Stop reason: SDUPTHER

## 2022-09-28 RX ORDER — ONDANSETRON 2 MG/ML
4 INJECTION INTRAMUSCULAR; INTRAVENOUS
Status: DISCONTINUED | OUTPATIENT
Start: 2022-09-28 | End: 2022-09-28 | Stop reason: HOSPADM

## 2022-09-28 RX ORDER — HYDROMORPHONE HYDROCHLORIDE 1 MG/ML
0.5 INJECTION, SOLUTION INTRAMUSCULAR; INTRAVENOUS; SUBCUTANEOUS EVERY 5 MIN PRN
Status: COMPLETED | OUTPATIENT
Start: 2022-09-28 | End: 2022-09-28

## 2022-09-28 RX ORDER — OXYCODONE AND ACETAMINOPHEN 7.5; 325 MG/1; MG/1
1 TABLET ORAL ONCE
Status: COMPLETED | OUTPATIENT
Start: 2022-09-28 | End: 2022-09-28

## 2022-09-28 RX ORDER — SODIUM CHLORIDE 9 MG/ML
INJECTION, SOLUTION INTRAVENOUS PRN
Status: DISCONTINUED | OUTPATIENT
Start: 2022-09-28 | End: 2022-09-28 | Stop reason: HOSPADM

## 2022-09-28 RX ORDER — FENTANYL CITRATE 50 UG/ML
INJECTION, SOLUTION INTRAMUSCULAR; INTRAVENOUS PRN
Status: DISCONTINUED | OUTPATIENT
Start: 2022-09-28 | End: 2022-09-28 | Stop reason: SDUPTHER

## 2022-09-28 RX ORDER — HYDROMORPHONE HYDROCHLORIDE 1 MG/ML
0.25 INJECTION, SOLUTION INTRAMUSCULAR; INTRAVENOUS; SUBCUTANEOUS EVERY 5 MIN PRN
Status: DISCONTINUED | OUTPATIENT
Start: 2022-09-28 | End: 2022-09-28 | Stop reason: HOSPADM

## 2022-09-28 RX ORDER — SCOLOPAMINE TRANSDERMAL SYSTEM 1 MG/1
1 PATCH, EXTENDED RELEASE TRANSDERMAL
Status: DISCONTINUED | OUTPATIENT
Start: 2022-09-28 | End: 2022-09-28 | Stop reason: HOSPADM

## 2022-09-28 RX ORDER — BUPIVACAINE HYDROCHLORIDE AND EPINEPHRINE 2.5; 5 MG/ML; UG/ML
INJECTION, SOLUTION INFILTRATION; PERINEURAL PRN
Status: DISCONTINUED | OUTPATIENT
Start: 2022-09-28 | End: 2022-09-28 | Stop reason: ALTCHOICE

## 2022-09-28 RX ORDER — SUCCINYLCHOLINE CHLORIDE 20 MG/ML
INJECTION INTRAMUSCULAR; INTRAVENOUS PRN
Status: DISCONTINUED | OUTPATIENT
Start: 2022-09-28 | End: 2022-09-28 | Stop reason: SDUPTHER

## 2022-09-28 RX ORDER — ONDANSETRON 2 MG/ML
INJECTION INTRAMUSCULAR; INTRAVENOUS PRN
Status: DISCONTINUED | OUTPATIENT
Start: 2022-09-28 | End: 2022-09-28 | Stop reason: SDUPTHER

## 2022-09-28 RX ORDER — SODIUM CHLORIDE 0.9 % (FLUSH) 0.9 %
5-40 SYRINGE (ML) INJECTION PRN
Status: DISCONTINUED | OUTPATIENT
Start: 2022-09-28 | End: 2022-09-28 | Stop reason: HOSPADM

## 2022-09-28 RX ORDER — ROCURONIUM BROMIDE 10 MG/ML
INJECTION, SOLUTION INTRAVENOUS PRN
Status: DISCONTINUED | OUTPATIENT
Start: 2022-09-28 | End: 2022-09-28 | Stop reason: SDUPTHER

## 2022-09-28 RX ORDER — PROPOFOL 10 MG/ML
INJECTION, EMULSION INTRAVENOUS CONTINUOUS PRN
Status: DISCONTINUED | OUTPATIENT
Start: 2022-09-28 | End: 2022-09-28 | Stop reason: SDUPTHER

## 2022-09-28 RX ORDER — DEXMEDETOMIDINE HYDROCHLORIDE 100 UG/ML
INJECTION, SOLUTION INTRAVENOUS PRN
Status: DISCONTINUED | OUTPATIENT
Start: 2022-09-28 | End: 2022-09-28 | Stop reason: SDUPTHER

## 2022-09-28 RX ORDER — SODIUM CHLORIDE, SODIUM LACTATE, POTASSIUM CHLORIDE, CALCIUM CHLORIDE 600; 310; 30; 20 MG/100ML; MG/100ML; MG/100ML; MG/100ML
INJECTION, SOLUTION INTRAVENOUS CONTINUOUS
Status: DISCONTINUED | OUTPATIENT
Start: 2022-09-28 | End: 2022-09-28 | Stop reason: HOSPADM

## 2022-09-28 RX ORDER — DEXAMETHASONE SODIUM PHOSPHATE 10 MG/ML
INJECTION, SOLUTION INTRAMUSCULAR; INTRAVENOUS PRN
Status: DISCONTINUED | OUTPATIENT
Start: 2022-09-28 | End: 2022-09-28 | Stop reason: SDUPTHER

## 2022-09-28 RX ORDER — OXYCODONE AND ACETAMINOPHEN 7.5; 325 MG/1; MG/1
1 TABLET ORAL EVERY 4 HOURS PRN
Qty: 18 TABLET | Refills: 0 | Status: SHIPPED | OUTPATIENT
Start: 2022-09-28 | End: 2022-10-01

## 2022-09-28 RX ORDER — METOCLOPRAMIDE HYDROCHLORIDE 5 MG/ML
10 INJECTION INTRAMUSCULAR; INTRAVENOUS ONCE
Status: COMPLETED | OUTPATIENT
Start: 2022-09-28 | End: 2022-09-28

## 2022-09-28 RX ORDER — MIDAZOLAM HYDROCHLORIDE 1 MG/ML
INJECTION INTRAMUSCULAR; INTRAVENOUS PRN
Status: DISCONTINUED | OUTPATIENT
Start: 2022-09-28 | End: 2022-09-28 | Stop reason: SDUPTHER

## 2022-09-28 RX ORDER — DEXAMETHASONE SODIUM PHOSPHATE 4 MG/ML
4 INJECTION, SOLUTION INTRA-ARTICULAR; INTRALESIONAL; INTRAMUSCULAR; INTRAVENOUS; SOFT TISSUE ONCE
Status: COMPLETED | OUTPATIENT
Start: 2022-09-28 | End: 2022-09-28

## 2022-09-28 RX ORDER — SODIUM CHLORIDE, SODIUM LACTATE, POTASSIUM CHLORIDE, CALCIUM CHLORIDE 600; 310; 30; 20 MG/100ML; MG/100ML; MG/100ML; MG/100ML
INJECTION, SOLUTION INTRAVENOUS CONTINUOUS PRN
Status: DISCONTINUED | OUTPATIENT
Start: 2022-09-28 | End: 2022-09-28 | Stop reason: SDUPTHER

## 2022-09-28 RX ORDER — PROPOFOL 10 MG/ML
INJECTION, EMULSION INTRAVENOUS PRN
Status: DISCONTINUED | OUTPATIENT
Start: 2022-09-28 | End: 2022-09-28 | Stop reason: SDUPTHER

## 2022-09-28 RX ORDER — SODIUM CHLORIDE 0.9 % (FLUSH) 0.9 %
5-40 SYRINGE (ML) INJECTION EVERY 12 HOURS SCHEDULED
Status: DISCONTINUED | OUTPATIENT
Start: 2022-09-28 | End: 2022-09-28 | Stop reason: HOSPADM

## 2022-09-28 RX ORDER — APREPITANT 40 MG/1
40 CAPSULE ORAL ONCE
Status: COMPLETED | OUTPATIENT
Start: 2022-09-28 | End: 2022-09-28

## 2022-09-28 RX ADMIN — Medication 3000 MG: at 10:04

## 2022-09-28 RX ADMIN — HYDROMORPHONE HYDROCHLORIDE 0.5 MG: 1 INJECTION, SOLUTION INTRAMUSCULAR; INTRAVENOUS; SUBCUTANEOUS at 11:48

## 2022-09-28 RX ADMIN — DEXMEDETOMIDINE HYDROCHLORIDE 10 MCG: 100 INJECTION, SOLUTION, CONCENTRATE INTRAVENOUS at 10:00

## 2022-09-28 RX ADMIN — SUGAMMADEX 300 MG: 100 INJECTION, SOLUTION INTRAVENOUS at 11:05

## 2022-09-28 RX ADMIN — DEXAMETHASONE SODIUM PHOSPHATE 4 MG: 4 INJECTION, SOLUTION INTRAMUSCULAR; INTRAVENOUS at 08:43

## 2022-09-28 RX ADMIN — OXYCODONE HYDROCHLORIDE AND ACETAMINOPHEN 1 TABLET: 7.5; 325 TABLET ORAL at 12:38

## 2022-09-28 RX ADMIN — SUCCINYLCHOLINE CHLORIDE 100 MG: 20 INJECTION, SOLUTION INTRAMUSCULAR; INTRAVENOUS at 09:58

## 2022-09-28 RX ADMIN — METOCLOPRAMIDE 10 MG: 5 INJECTION, SOLUTION INTRAMUSCULAR; INTRAVENOUS at 11:53

## 2022-09-28 RX ADMIN — ROCURONIUM BROMIDE 45 MG: 10 INJECTION, SOLUTION INTRAVENOUS at 10:02

## 2022-09-28 RX ADMIN — LIDOCAINE HYDROCHLORIDE 50 MG: 10 INJECTION, SOLUTION INFILTRATION; PERINEURAL at 09:58

## 2022-09-28 RX ADMIN — SODIUM CHLORIDE 20 MG: 9 INJECTION INTRAMUSCULAR; INTRAVENOUS; SUBCUTANEOUS at 08:43

## 2022-09-28 RX ADMIN — OXYCODONE HYDROCHLORIDE AND ACETAMINOPHEN 1 TABLET: 7.5; 325 TABLET ORAL at 16:24

## 2022-09-28 RX ADMIN — ROCURONIUM BROMIDE 30 MG: 10 INJECTION, SOLUTION INTRAVENOUS at 10:41

## 2022-09-28 RX ADMIN — HYDROMORPHONE HYDROCHLORIDE 0.5 MG: 1 INJECTION, SOLUTION INTRAMUSCULAR; INTRAVENOUS; SUBCUTANEOUS at 12:11

## 2022-09-28 RX ADMIN — DEXAMETHASONE SODIUM PHOSPHATE 10 MG: 10 INJECTION, SOLUTION INTRAMUSCULAR; INTRAVENOUS at 10:02

## 2022-09-28 RX ADMIN — ONDANSETRON 4 MG: 2 INJECTION INTRAMUSCULAR; INTRAVENOUS at 11:05

## 2022-09-28 RX ADMIN — FENTANYL CITRATE 100 MCG: 50 INJECTION, SOLUTION INTRAMUSCULAR; INTRAVENOUS at 09:58

## 2022-09-28 RX ADMIN — HYDROMORPHONE HYDROCHLORIDE 0.5 MG: 1 INJECTION, SOLUTION INTRAMUSCULAR; INTRAVENOUS; SUBCUTANEOUS at 11:58

## 2022-09-28 RX ADMIN — ONDANSETRON 4 MG: 2 INJECTION INTRAMUSCULAR; INTRAVENOUS at 11:17

## 2022-09-28 RX ADMIN — MIDAZOLAM 2 MG: 1 INJECTION INTRAMUSCULAR; INTRAVENOUS at 09:50

## 2022-09-28 RX ADMIN — ROCURONIUM BROMIDE 5 MG: 10 INJECTION, SOLUTION INTRAVENOUS at 09:58

## 2022-09-28 RX ADMIN — HYDROMORPHONE HYDROCHLORIDE 0.5 MG: 1 INJECTION, SOLUTION INTRAMUSCULAR; INTRAVENOUS; SUBCUTANEOUS at 11:38

## 2022-09-28 RX ADMIN — APREPITANT 40 MG: 40 CAPSULE ORAL at 08:43

## 2022-09-28 RX ADMIN — PROPOFOL 180 MCG/KG/MIN: 10 INJECTION, EMULSION INTRAVENOUS at 10:00

## 2022-09-28 RX ADMIN — SODIUM CHLORIDE, SODIUM LACTATE, POTASSIUM CHLORIDE, AND CALCIUM CHLORIDE: 600; 310; 30; 20 INJECTION, SOLUTION INTRAVENOUS at 09:52

## 2022-09-28 RX ADMIN — PROPOFOL 200 MG: 10 INJECTION, EMULSION INTRAVENOUS at 09:58

## 2022-09-28 ASSESSMENT — PAIN DESCRIPTION - DESCRIPTORS
DESCRIPTORS: ACHING
DESCRIPTORS: ACHING
DESCRIPTORS: STABBING
DESCRIPTORS: ACHING
DESCRIPTORS: ACHING
DESCRIPTORS: STABBING
DESCRIPTORS: ACHING
DESCRIPTORS: ACHING

## 2022-09-28 ASSESSMENT — PAIN DESCRIPTION - LOCATION
LOCATION: ABDOMEN

## 2022-09-28 ASSESSMENT — PAIN DESCRIPTION - ORIENTATION
ORIENTATION: UPPER;LEFT
ORIENTATION: LEFT
ORIENTATION: LEFT
ORIENTATION: LEFT;UPPER

## 2022-09-28 ASSESSMENT — PAIN DESCRIPTION - FREQUENCY
FREQUENCY: CONTINUOUS
FREQUENCY: CONTINUOUS

## 2022-09-28 ASSESSMENT — PAIN SCALES - GENERAL
PAINLEVEL_OUTOF10: 9
PAINLEVEL_OUTOF10: 9
PAINLEVEL_OUTOF10: 10
PAINLEVEL_OUTOF10: 9
PAINLEVEL_OUTOF10: 10
PAINLEVEL_OUTOF10: 10

## 2022-09-28 ASSESSMENT — PAIN - FUNCTIONAL ASSESSMENT
PAIN_FUNCTIONAL_ASSESSMENT: ACTIVITIES ARE NOT PREVENTED
PAIN_FUNCTIONAL_ASSESSMENT: 0-10
PAIN_FUNCTIONAL_ASSESSMENT: ACTIVITIES ARE NOT PREVENTED

## 2022-09-28 ASSESSMENT — PAIN DESCRIPTION - PAIN TYPE
TYPE: SURGICAL PAIN
TYPE: SURGICAL PAIN

## 2022-09-28 ASSESSMENT — LIFESTYLE VARIABLES: SMOKING_STATUS: 0

## 2022-09-28 NOTE — PROGRESS NOTES
CLINICAL PHARMACY NOTE: MEDS TO BEDS    Total # of Prescriptions Filled: 1   The following medications were delivered to the patient:  Oxycodone-APAP 7.5-325 mg     Additional Documentation:   Delivered Rx to [facility]. Gave Rx to patients spouse Flakito Nettles. Ayanna paid $1.60 copay with [employer]z.

## 2022-09-28 NOTE — ANESTHESIA PRE PROCEDURE
Department of Anesthesiology  Preprocedure Note       Name:  Griselda Wharton   Age:  42 y.o.  :  1980                                          MRN:  [id number]         Date:  2022      Surgeon: Ye Pulliam):  [address] [place] [address]    Procedure: Procedure(s):  ROBOTIC INTERVAL APPENDECTOMY    Medications prior to admission:   Prior to Admission medications    Medication Sig Start Date End Date Taking? Authorizing Provider   amphetamine-dextroamphetamine (ADDERALL XR) 30 MG extended release capsule Take 1 capsule by mouth in the morning and 1 capsule in the evening. Do all this for 30 days. 9/15/22 10/15/22  Michelle Prince MD   doxycycline monohydrate (MONODOX) 100 MG capsule Take 1 capsule by mouth daily 3/17/22   Chris Negron MD   irbesartan (AVAPRO) 300 MG tablet Take 1 tablet by mouth daily 21   Chris Negron MD       Current medications:    No current facility-administered medications for this encounter.        Allergies:  No Known Allergies    Problem List:    Patient Active Problem List   Diagnosis Code    Obstructive sleep apnea G47.33    Narcolepsy without cataplexy G47.419    CPAP (continuous positive airway pressure) dependence Z99.89    Essential (primary) hypertension I10    Hyperlipidemia E78.5    Chronic midline low back pain with bilateral sciatica M54.41, M54.42, G89.29    Chronic midline thoracic back pain M54.6, G89.29    Class 3 severe obesity due to excess calories with serious comorbidity and body mass index (BMI) of 40.0 to 44.9 in adult (HCC) E66.01, Z68.41    Coccydynia M53.3    Acute appendicitis K35.80    Acute appendicitis with perforation and localized peritonitis, with abscess K35.33       Past Medical History:        Diagnosis Date    Headache     Hypertension     Low back pain     Narcolepsy without cataplexy(347.00)     Obstructive sleep apnea     AHI:  29.0 per PSG,     PONV (postoperative nausea and vomiting)        Past Surgical History: Procedure Laterality Date    ABSCESS DRAINAGE      COLONOSCOPY N/A 08/15/2022    Dr Mohan Nelson questionable erythema in the ileum, no ulcerations seen-AP x 1, HP x 1, 5 yr recall    LAPAROSCOPIC APPENDECTOMY N/A 06/21/2022    DIAGNOSTIC LAPAROSCOPY WITH DRAINAGE OF ABCESS AND DRAIN PLACEMENT performed by Ubaldo Machado DO at [facility] OR       Social History:    Social History     Tobacco Use    Smoking status: Never    Smokeless tobacco: Never   Substance Use Topics    Alcohol use: Not Currently     Comment: twice a year social                                Counseling given: Not Answered      Vital Signs (Current): There were no vitals filed for this visit.                                            BP Readings from Last 3 Encounters:   08/23/22 130/74   08/15/22 133/86   08/08/22 (!) 134/92       NPO Status:                                                                                 BMI:   Wt Readings from Last 3 Encounters:   09/26/22 280 lb (127 kg)   08/23/22 287 lb 12.8 oz (130.5 kg)   08/15/22 287 lb (130.2 kg)     There is no height or weight on file to calculate BMI.    CBC:   Lab Results   Component Value Date/Time    WBC 10.3 07/29/2022 10:13 AM    RBC 5.24 07/29/2022 10:13 AM    HGB 15.7 07/29/2022 10:13 AM    HCT 49.5 07/29/2022 10:13 AM    HCT 47.3 05/16/2012 03:00 AM    MCV 94.5 07/29/2022 10:13 AM    RDW 13.9 07/29/2022 10:13 AM     07/29/2022 10:13 AM     05/16/2012 03:00 AM       CMP:   Lab Results   Component Value Date/Time     07/29/2022 10:13 AM     05/16/2012 03:00 AM    K 4.9 07/29/2022 10:13 AM    K 4.6 06/22/2022 04:14 AM    K 4.2 05/16/2012 03:00 AM     07/29/2022 10:13 AM     05/16/2012 03:00 AM    CO2 29 07/29/2022 10:13 AM    BUN 20 07/29/2022 10:13 AM    CREATININE 0.9 07/29/2022 10:13 AM    CREATININE 1.1 05/16/2012 03:00 AM    GFRAA >59 07/29/2022 10:13 AM    LABGLOM >60 07/29/2022 10:13 AM    GLUCOSE 109 07/29/2022 10:13 AM PROT 7.0 06/21/2022 03:20 AM    PROT 6.9 05/16/2012 03:00 AM    CALCIUM 9.2 07/29/2022 10:13 AM    BILITOT 0.6 06/21/2022 03:20 AM    ALKPHOS 61 06/21/2022 03:20 AM    ALKPHOS 96 05/16/2012 03:00 AM    AST 11 06/21/2022 03:20 AM    ALT 15 06/21/2022 03:20 AM       POC Tests: No results for input(s): POCGLU, POCNA, POCK, POCCL, POCBUN, POCHEMO, POCHCT in the last 72 hours. Coags: No results found for: PROTIME, INR, APTT    HCG (If Applicable): No results found for: PREGTESTUR, PREGSERUM, HCG, HCGQUANT     ABGs: No results found for: PHART, PO2ART, CVI6MYS, UZK0NFW, BEART, O1CKAZJY     Type & Screen (If Applicable):  No results found for: LABABO, LABRH    Drug/Infectious Status (If Applicable):  No results found for: HIV, HEPCAB    COVID-19 Screening (If Applicable):   Lab Results   Component Value Date/Time    COVID19 Not Detected 06/21/2022 05:26 AM           Anesthesia Evaluation  Patient summary reviewed   history of anesthetic complications: PONV. Airway: Mallampati: I  TM distance: >3 FB   Neck ROM: full  Mouth opening: > = 3 FB   Dental:          Pulmonary:normal exam  breath sounds clear to auscultation  (+) sleep apnea: on noncompliant,      (-) asthma, recent URI and not a current smoker          Patient did not smoke on day of surgery. Cardiovascular:  Exercise tolerance: good (>4 METS),   (+) hypertension:,     (-) pacemaker, past MI, CABG/stent and  angina    ECG reviewed  Rhythm: regular  Rate: normal           Beta Blocker:  Not on Beta Blocker         Neuro/Psych:   (+) headaches:,    (-) seizures, TIA and CVA            ROS comment: Narcolepsy GI/Hepatic/Renal:   (+) morbid obesity     (-) GERD, liver disease and no renal disease       Endo/Other:        (-) diabetes mellitus, hypothyroidism, hyperthyroidism               Abdominal:   (+) obese,           Vascular:     - DVT.       Other Findings:           Anesthesia Plan      general and TIVA     ASA 3     (Preop famotidine, dexamethasone, scopolamine, emend  TIVA for PONV prophylaxis)  Induction: intravenous. MIPS: Postoperative opioids intended and Prophylactic antiemetics administered. Anesthetic plan and risks discussed with patient. Use of blood products discussed with patient whom consented to blood products.                      Balaji Ramsey MD   9/28/2022

## 2022-09-28 NOTE — PROGRESS NOTES
Patient states that his pain continues as a 9. Points to upper abdomen and c/o \"it is a stabbing pain in this one spot\". Also, states he is not going anywhere \"in this pain. \"  Explained to patient and his significant other that Dr. Tono Carrasquillo is in surgery at this time. I will have him come and evaluate him when he gets out of surgery. Verbalized understanding.   Electronically signed by Cyrus Carey RN on 9/28/2022 at 1:24 PM

## 2022-09-28 NOTE — OP NOTE
Operative Note      Patient: Bridget Knight  YOB: 1980  MRN: [id number]    Date of Procedure: 9/28/2022    Pre-Op Diagnosis: Perforated Appendicitis with abscess [K35.33]    Post-Op Diagnosis: Same       Procedure(s):  ROBOTIC INTERVAL APPENDECTOMY    Surgeon(s):  [facility],     Assistant:   First Assistant: Namrata Kraus RN    Anesthesia: General    Estimated Blood Loss (mL): Minimal    Complications: None    Specimens:   ID Type Source Tests Collected by Time Destination   A : appendix Tissue Appendix [address], DO 9/28/2022 1030        The patient was identified in the preoperative holding suite. Consent confirmed and placed on chart. Preoperative antibiotics were administered that time. Next patient was taken the operative suite and placed in supine position. Endotracheal anesthesia provided by department of anesthesia. Next the abdomen was prepped and draped in usual sterile fashion after Lucero catheter was placed. Timeout performed and all were in agreement. The abdomen was entered in the left upper quadrant using 5 mm Fios trocar. Pneumoperitoneum achieved to level of 15 mmHg. No harm noted from into a in the abdomen. At that point 3 robotic trochars were placed 5 cm apart from each other in a straight line down the left abdomen. These were all placed under direct visualization. Next the left upper quadrant 5 mm trocar was changed for a 12 mm trocar. At that point the [id number] was brought on the field and docked. Instruments were inserted under direct visualization and placed in the right lower quadrant. Blunt dissection was taken the right lower quadrant and the tip of the appendix was identified. This was grasped and elevated. There was scar tissue noted from previous inflammation and this was taken down using scissors with cautery. Once the appendix was able to be elevated the appendix was dissected from the surrounding tissues. Window was created at the base of the appendix. Bipolar used to ligate the appendiceal artery. Robotic JYOTI 45 stapler then used to transect the appendix at the base. Appendix was then removed in a bag. No sign of bleeding noted at the appendiceal stump. Next the robotic instruments were all removed under direct visualization. Left upper quadrant 12 mm site was then closed using a transfascial stitch of 0 Vicryl. Pneumoperitoneum was released after an Intra-Op tap block performed with Exparel. All trochars removed. Next the incisions were closed using 4-0 Monocryl in a buried interrupted fashion. Skin was cleaned and dried and Dermabond applied. All needle sponge and instrument counts were correct x2.       Electronically signed by Janette Poole DO on 9/28/2022 at 11:39 AM

## 2022-09-28 NOTE — H&P
SUBJECTIVE:  Mr. Carina Vásquez is a 42 y.o. male who presents today for follow-up for discussion of interval appendectomy. Patient still having intermittent pain in his right lower quadrant. Did undergo colonoscopy which showed no mass within the cecum. Did show some mild inflammation in the terminal ileum but biopsies were noted to be normal.  Denies fevers or chills. Patient's medications, allergies, past medical, surgical, social and family histories werereviewed and updated as appropriate. Review of Systems   Constitutional:  Positive for fatigue. Negative for chills, diaphoresis and fever. HENT:  Negative for ear discharge, ear pain, facial swelling and nosebleeds. Eyes:  Negative for pain, discharge and redness. Respiratory:  Negative for cough, chest tightness, shortness of breath and wheezing. Cardiovascular:  Negative for chest pain and palpitations. Gastrointestinal:  Positive for abdominal pain. Negative for constipation, diarrhea and nausea. Genitourinary:  Negative for difficulty urinating, flank pain and hematuria. Musculoskeletal:  Negative for neck pain and neck stiffness. Neurological:  Negative for dizziness, numbness and headaches. Psychiatric/Behavioral:  Negative for agitation, behavioral problems and self-injury. OBJECTIVE:  There were no vitals taken for this visit. Physical Exam  Vitals reviewed. Constitutional:       Appearance: Normal appearance. He is obese. HENT:      Head: Normocephalic and atraumatic. Right Ear: External ear normal.      Left Ear: External ear normal.      Nose: Nose normal.   Eyes:      Extraocular Movements: Extraocular movements intact. Pulmonary:      Effort: Pulmonary effort is normal. No respiratory distress. Abdominal:      General: There is no distension. Palpations: Abdomen is soft. Tenderness: There is no abdominal tenderness. There is no guarding or rebound.       Comments: Incisions C/D/I Musculoskeletal:         General: Normal range of motion. Cervical back: Normal range of motion. Skin:     General: Skin is warm and dry. Neurological:      General: No focal deficit present. Mental Status: He is alert and oriented to person, place, and time. Psychiatric:         Mood and Affect: Mood normal.         Behavior: Behavior normal.         Thought Content: Thought content normal.         ASSESSMENT:    Diagnosis Orders   1. Acute appendicitis with perforation, localized peritonitis, and abscess, without gangrene          2. Class 3 severe obesity due to excess calories with serious comorbidity and body mass index (BMI) of 40.0 to 44.9 in [facility])                PLAN:  No orders of the defined types were placed in this encounter. No orders of the defined types were placed in this encounter. The risks, benefits, and alternatives were discussed with the pt. He is willing to accept the risks and proceed with a Robotic/Laparoscopic Interval appendectomy. The surgical risks included but not limited to bleeding, infection, perforation, recurrence, risk of needing further surgery, etc. The anesthetic risks included heart attacks, strokes, pneumonia, phlebitis, etc.  He is willing to accept all risks and proceed with surgery. No guarantees have been given. No follow-ups on file.      Josh Braden,

## 2022-09-28 NOTE — ANESTHESIA POSTPROCEDURE EVALUATION
Department of Anesthesiology  Postprocedure Note    Patient: Krystal Drummond  MRN: [id number]  YOB: 1980  Date of evaluation: 9/28/2022      Procedure Summary     Date: 09/28/22 Room / Location: [address]    Anesthesia Start: 7297 Anesthesia Stop: 1130    Procedure: ROBOTIC INTERVAL APPENDECTOMY Diagnosis:       Appendicitis with abscess      (Appendicitis with abscess [K35.33])    Surgeons: Alfonso Corrigan DO Responsible Provider: DONALD Vidales CRNA    Anesthesia Type: general, TIVA ASA Status: 3          Anesthesia Type: No value filed. Mary Phase I: Mary Score: 10    Mary Phase II:        Anesthesia Post Evaluation    Patient location during evaluation: PACU  Patient participation: complete - patient participated  Level of consciousness: awake and alert  Pain score: 0  Airway patency: patent  Nausea & Vomiting: no nausea and no vomiting  Complications: no  Cardiovascular status: blood pressure returned to baseline  Respiratory status: acceptable, spontaneous ventilation and room air  Hydration status: stable  Comments: Patient IV pulled during transfer from OR table to stretcher. PACU RN to start IV.

## 2022-09-28 NOTE — PROGRESS NOTES
Dr. Sebastian Lawler at bedside, he explains to patient why he is hurting in his abdomen at that one particular place and states he is okay to go home. Patient and significant other is more at ease at this point.   Electronically signed by Catalina Olson RN on 9/28/2022 at 4:31 PM

## 2022-09-28 NOTE — PROGRESS NOTES
Patient's states the pain is a \"little\" better but continues to hold his upper abdomen and is grimacing.   Electronically signed by Gentry Edwards RN on 9/28/2022 at 3:59 PM

## 2022-10-03 DIAGNOSIS — K35.80 ACUTE APPENDICITIS, UNSPECIFIED ACUTE APPENDICITIS TYPE: Primary | ICD-10-CM

## 2022-10-03 RX ORDER — GABAPENTIN 100 MG/1
200 CAPSULE ORAL 3 TIMES DAILY
Qty: 42 CAPSULE | Refills: 0 | Status: SHIPPED | OUTPATIENT
Start: 2022-10-03 | End: 2022-10-11 | Stop reason: SDUPTHER

## 2022-10-07 ENCOUNTER — TELEPHONE (OUTPATIENT)
Dept: SURGERY | Age: 42
End: 2022-10-07

## 2022-10-07 ENCOUNTER — HOSPITAL ENCOUNTER (OUTPATIENT)
Dept: VASCULAR LAB | Age: 42
Discharge: HOME | End: 2022-10-07
Payer: COMMERCIAL

## 2022-10-07 DIAGNOSIS — M79.604 RIGHT LEG PAIN: Primary | ICD-10-CM

## 2022-10-07 PROCEDURE — 93971 EXTREMITY STUDY: CPT

## 2022-10-07 NOTE — TELEPHONE ENCOUNTER
10/7/2022 Patient called and stated he is having a shooting pain going down his leg and wants to be sure it is not related to his surgery. He had appendicitis removed on 9/28/22. Please return call.     Callback # [phone]  [email or web address]

## 2022-10-11 ENCOUNTER — OFFICE VISIT (OUTPATIENT)
Dept: FAMILY MEDICINE CLINIC | Age: 42
End: 2022-10-11
Payer: COMMERCIAL

## 2022-10-11 ENCOUNTER — HOSPITAL ENCOUNTER (OUTPATIENT)
Dept: GENERAL RADIOLOGY | Age: 42
Discharge: HOME | End: 2022-10-11
Payer: COMMERCIAL

## 2022-10-11 VITALS
OXYGEN SATURATION: 96 % | DIASTOLIC BLOOD PRESSURE: 68 MMHG | WEIGHT: 295 LBS | SYSTOLIC BLOOD PRESSURE: 122 MMHG | BODY MASS INDEX: 43.56 KG/M2 | HEART RATE: 85 BPM | TEMPERATURE: 97.2 F

## 2022-10-11 DIAGNOSIS — K35.80 ACUTE APPENDICITIS, UNSPECIFIED ACUTE APPENDICITIS TYPE: ICD-10-CM

## 2022-10-11 DIAGNOSIS — G89.29 CHRONIC MIDLINE LOW BACK PAIN WITH BILATERAL SCIATICA: ICD-10-CM

## 2022-10-11 DIAGNOSIS — M25.551 RIGHT HIP PAIN: ICD-10-CM

## 2022-10-11 DIAGNOSIS — G47.33 OBSTRUCTIVE SLEEP APNEA: ICD-10-CM

## 2022-10-11 DIAGNOSIS — M79.10 MYALGIA: ICD-10-CM

## 2022-10-11 DIAGNOSIS — Z00.00 ANNUAL PHYSICAL EXAM: Primary | ICD-10-CM

## 2022-10-11 DIAGNOSIS — Z99.89 CPAP (CONTINUOUS POSITIVE AIRWAY PRESSURE) DEPENDENCE: ICD-10-CM

## 2022-10-11 DIAGNOSIS — G47.419 NARCOLEPSY WITHOUT CATAPLEXY: ICD-10-CM

## 2022-10-11 DIAGNOSIS — M54.42 CHRONIC MIDLINE LOW BACK PAIN WITH BILATERAL SCIATICA: ICD-10-CM

## 2022-10-11 DIAGNOSIS — I10 ESSENTIAL (PRIMARY) HYPERTENSION: ICD-10-CM

## 2022-10-11 DIAGNOSIS — M54.41 CHRONIC MIDLINE LOW BACK PAIN WITH BILATERAL SCIATICA: ICD-10-CM

## 2022-10-11 LAB
ALBUMIN SERPL-MCNC: 4.1 G/DL (ref 3.5–5.2)
ALP BLD-CCNC: 66 U/L (ref 40–130)
ALT SERPL-CCNC: 17 U/L (ref 5–41)
AMPHETAMINE SCREEN, URINE: NEGATIVE
ANION GAP SERPL CALCULATED.3IONS-SCNC: 11 MMOL/L (ref 7–19)
AST SERPL-CCNC: 14 U/L (ref 5–40)
BACTERIA: NEGATIVE /HPF
BARBITURATE SCREEN URINE: NEGATIVE
BASOPHILS ABSOLUTE: 0.1 K/UL (ref 0–0.2)
BASOPHILS RELATIVE PERCENT: 0.9 % (ref 0–1)
BENZODIAZEPINE SCREEN, URINE: NEGATIVE
BILIRUB SERPL-MCNC: 0.4 MG/DL (ref 0.2–1.2)
BILIRUBIN URINE: NEGATIVE
BLOOD, URINE: NEGATIVE
BUN BLDV-MCNC: 20 MG/DL (ref 6–20)
CALCIUM SERPL-MCNC: 9.4 MG/DL (ref 8.6–10)
CANNABINOID SCREEN URINE: NEGATIVE
CHLORIDE BLD-SCNC: 104 MMOL/L (ref 98–111)
CLARITY: ABNORMAL
CO2: 27 MMOL/L (ref 22–29)
COCAINE METABOLITE SCREEN URINE: NEGATIVE
COLOR: YELLOW
CREAT SERPL-MCNC: 0.8 MG/DL (ref 0.5–1.2)
CRYSTALS, UA: ABNORMAL /HPF
EOSINOPHILS ABSOLUTE: 0.1 K/UL (ref 0–0.6)
EOSINOPHILS RELATIVE PERCENT: 1.3 % (ref 0–5)
EPITHELIAL CELLS, UA: 1 /HPF (ref 0–5)
GFR AFRICAN AMERICAN: >59
GFR NON-AFRICAN AMERICAN: >60
GLUCOSE BLD-MCNC: 116 MG/DL (ref 74–109)
GLUCOSE URINE: NEGATIVE MG/DL
HCT VFR BLD CALC: 51.4 % (ref 42–52)
HEMOGLOBIN: 16.3 G/DL (ref 14–18)
HYALINE CASTS: 0 /HPF (ref 0–8)
IMMATURE GRANULOCYTES #: 0.1 K/UL
KETONES, URINE: NEGATIVE MG/DL
LEUKOCYTE ESTERASE, URINE: NEGATIVE
LYMPHOCYTES ABSOLUTE: 3.9 K/UL (ref 1.1–4.5)
LYMPHOCYTES RELATIVE PERCENT: 40.2 % (ref 20–40)
Lab: ABNORMAL
MCH RBC QN AUTO: 29.9 PG (ref 27–31)
MCHC RBC AUTO-ENTMCNC: 31.7 G/DL (ref 33–37)
MCV RBC AUTO: 94.1 FL (ref 80–94)
METHADONE SCREEN, URINE: NEGATIVE
MONOCYTES ABSOLUTE: 1 K/UL (ref 0–0.9)
MONOCYTES RELATIVE PERCENT: 9.9 % (ref 0–10)
NEUTROPHILS ABSOLUTE: 4.5 K/UL (ref 1.5–7.5)
NEUTROPHILS RELATIVE PERCENT: 47.1 % (ref 50–65)
NITRITE, URINE: NEGATIVE
OPIATE SCREEN URINE: NEGATIVE
OXYCODONE URINE: POSITIVE
PDW BLD-RTO: 13.6 % (ref 11.5–14.5)
PH UA: 6.5 (ref 5–8)
PHENCYCLIDINE SCREEN URINE: NEGATIVE
PLATELET # BLD: 283 K/UL (ref 130–400)
PMV BLD AUTO: 10.3 FL (ref 9.4–12.4)
POTASSIUM SERPL-SCNC: 4.7 MMOL/L (ref 3.5–5)
PROPOXYPHENE SCREEN: NEGATIVE
PROTEIN UA: NEGATIVE MG/DL
RBC # BLD: 5.46 M/UL (ref 4.7–6.1)
RBC UA: 3 /HPF (ref 0–4)
SODIUM BLD-SCNC: 142 MMOL/L (ref 136–145)
SPECIFIC GRAVITY UA: 1.02 (ref 1–1.03)
TOTAL PROTEIN: 7 G/DL (ref 6.6–8.7)
TRICYCLIC, URINE: NEGATIVE
UROBILINOGEN, URINE: 0.2 E.U./DL
WBC # BLD: 9.7 K/UL (ref 4.8–10.8)
WBC UA: 1 /HPF (ref 0–5)

## 2022-10-11 PROCEDURE — G8484 FLU IMMUNIZE NO ADMIN: HCPCS | Performed by: FAMILY MEDICINE

## 2022-10-11 PROCEDURE — 73521 X-RAY EXAM HIPS BI 2 VIEWS: CPT

## 2022-10-11 PROCEDURE — 99396 PREV VISIT EST AGE 40-64: CPT | Performed by: FAMILY MEDICINE

## 2022-10-11 RX ORDER — NAPROXEN 500 MG/1
500 TABLET ORAL 2 TIMES DAILY PRN
Qty: 30 TABLET | Refills: 0 | Status: SHIPPED | OUTPATIENT
Start: 2022-10-11

## 2022-10-11 RX ORDER — GABAPENTIN 300 MG/1
300 CAPSULE ORAL 3 TIMES DAILY
Qty: 90 CAPSULE | Refills: 0 | Status: SHIPPED | OUTPATIENT
Start: 2022-10-11 | End: 2022-11-10

## 2022-10-11 ASSESSMENT — ENCOUNTER SYMPTOMS
CONSTIPATION: 0
CHEST TIGHTNESS: 0
ANAL BLEEDING: 0
NAUSEA: 0
COUGH: 0
DIARRHEA: 0
ABDOMINAL PAIN: 0
BACK PAIN: 0
SHORTNESS OF BREATH: 0

## 2022-10-11 NOTE — PROGRESS NOTES
[facility] PHYSICIAN SERVICES  [facility] FAMILY MEDICINE  [address] [facility] [address] [address]  Dept: [phone]  Dept Fax: [phone]  Loc: [phone]    Brenda Rogers is a 42 y.o. male who presents today for his medical conditions/complaints as noted below. Brenda Rogers is here for Annual Exam        HPI:   CC: Here today to discuss the following:    Right hip pain  Location:\"really deep\" \"starts in the groin\". \"Feels like in the socket\". Timing: started 10/3  Severity: moderate to severe. Quality: sharp and stabbing. Worsening Factors:twisting, \"constantly hurts\"  Relieving Factors: no relief. Worse since he ran out of gabapentin. Trauma/Injury?:no falls or other trauma. Did have appendectomy on 9/28. He states he feels like his hip will also give out on him. Appendectomy on 9/28  - \"top incision is pretty painful\"  - gabapentin has helped  but he ran out.   - has some pain in the left upper quadrant  - ultrasound of right lower extremity: neg DVT        HPI    Subjective:      Review of Systems   Constitutional:  Negative for chills and fever. HENT:  Negative for congestion. Respiratory:  Negative for cough, chest tightness and shortness of breath. Cardiovascular:  Negative for chest pain, palpitations and leg swelling. Gastrointestinal:  Negative for abdominal pain, anal bleeding, constipation, diarrhea and nausea. Genitourinary:  Negative for difficulty urinating. Musculoskeletal:  Positive for arthralgias and myalgias. Negative for back pain, gait problem and joint swelling. Psychiatric/Behavioral: Negative. SeeHPI for visit specific review of symptoms. All others negative      Objective:   /68   Pulse 85   Temp 97.2 °F (36.2 °C)   Wt 295 lb (133.8 kg)   SpO2 96%   BMI 43.56 kg/m²   Physical Exam  Constitutional:       Appearance: He is well-developed. He is not ill-appearing.    Cardiovascular:      Rate and Rhythm: Normal rate and regular rhythm. Heart sounds: No murmur heard. No friction rub. Pulmonary:      Effort: Pulmonary effort is normal. No respiratory distress. Breath sounds: Normal breath sounds. No wheezing or rales. Abdominal:      General: There is no distension. Palpations: Abdomen is soft. Tenderness: There is no abdominal tenderness. Musculoskeletal:      Cervical back: Neck supple. No tenderness. Comments: Antalgic gait  No tenderness over the right greater trochanter. No SI joint tenderness  No lumbar tenderness. No soft tissue tenderness over the right hip   Lymphadenopathy:      Cervical: No cervical adenopathy. Neurological:      Mental Status: He is alert.    Psychiatric:         Behavior: Behavior normal.   Negative straight leg raise      Recent Results (from the past 672 hour(s))   EKG 12 Lead    Collection Time: 09/26/22  9:30 AM   Result Value Ref [place]    P-R Interval 138 ms    QRS Duration 96 ms    Q-T Interval 373 ms    QTc Calculation (Bazett) 449 ms    P Axis 9 degrees    T Axis 54 degrees   Comprehensive Metabolic Panel    Collection Time: 10/11/22  8:55 AM   Result Value Ref [place]    Sodium 142 136 - 145 mmol/L    Potassium 4.7 3.5 - 5.0 mmol/L    Chloride 104 98 - 111 mmol/L    CO2 27 22 - 29 mmol/L    Anion Gap 11 7 - 19 mmol/L    Glucose 116 (H) 74 - 109 mg/dL    BUN 20 6 - 20 mg/dL    Creatinine 0.8 0.5 - 1.2 mg/dL    GFR Non-African American >60 >60    GFR African American >59 >59    Calcium 9.4 8.6 - 10.0 mg/dL    Total Protein 7.0 6.6 - 8.7 g/dL    Albumin 4.1 3.5 - 5.2 g/dL    Total Bilirubin 0.4 0.2 - 1.2 mg/dL    Alkaline Phosphatase 66 40 - 130 U/L    ALT 17 5 - 41 U/L    AST 14 5 - 40 U/L   CBC with Auto Differential    Collection Time: 10/11/22  8:55 AM   Result Value Ref [place]    WBC 9.7 4.8 - 10.8 K/uL    RBC 5.46 4.70 - 6.10 M/uL    Hemoglobin 16.3 14.0 - 18.0 g/dL    Hematocrit 51.4 42.0 - 52.0 %    MCV 94.1 (H) 80.0 - 94.0 fL    MCH 29.9 27.0 - 31.0 pg MCHC 31.7 (L) 33.0 - 37.0 g/dL    RDW 13.6 11.5 - 14.5 %    Platelets 649 119 - 945 K/uL    MPV 10.3 9.4 - 12.4 fL    Neutrophils % 47.1 (L) 50.0 - 65.0 %    Lymphocytes % 40.2 (H) 20.0 - 40.0 %    Monocytes % 9.9 0.0 - 10.0 %    Eosinophils % 1.3 0.0 - 5.0 %    Basophils % 0.9 0.0 - 1.0 %    Neutrophils Absolute 4.5 1.5 - 7.5 K/uL    Immature Granulocytes # 0.1 K/uL    Lymphocytes Absolute 3.9 1.1 - 4.5 K/uL    Monocytes Absolute 1.00 (H) 0.00 - 0.90 K/uL    Eosinophils Absolute 0.10 0.00 - 0.60 K/uL    Basophils Absolute 0.10 0.00 - 0.20 K/uL               Assessment & Plan: The following diagnoses and conditions are stable with no further orders unless indicated:  1. Annual physical exam  Lab Results   Component Value Date    CHOL 179 02/25/2022    CHOL 171 09/20/2021    CHOL 197 05/18/2021     Lab Results   Component Value Date    TRIG 117 02/25/2022    TRIG 81 09/20/2021    TRIG 134 05/18/2021     Lab Results   Component Value Date    HDL 42 (L) 02/25/2022    HDL 40 (L) 09/20/2021    HDL 41 (L) 05/18/2021     Lab Results   Component Value Date    LDLCALC 114 02/25/2022    LDLCALC 115 09/20/2021    LDLCALC 129 05/18/2021     No results found for: LABVLDL, VLDL  No results found for: CHOLHDLRATIO  Discussed lifestyle modification    2. Obstructive sleep apnea  3. Narcolepsy without cataplexy  4. CPAP (continuous positive airway pressure) dependence  Continues to be followed by neurology    5. Essential (primary) hypertension  BP Readings from Last 3 Encounters:   10/11/22 122/68   09/28/22 (!) 142/72   08/23/22 130/74   Blood pressure stable  Continue with irbesartan 300 mg daily      6. Chronic midline low back pain with bilateral sciatica  He has a history of chronic lower back pain but his current pain is more in the right hip.      7. Acute appendicitis, unspecified acute appendicitis type  Status post appendectomy. Inspected incisions clean dry and intact.       8. Right hip pain  Overall exam is benign. He has no point tenderness. He does have an antalgic gait. - naproxen (NAPROSYN) 500 MG tablet; Take 1 tablet by mouth 2 times daily as needed for Pain  Dispense: 30 tablet; Refill: 0  - XR HIP BILATERAL W AP PELVIS (2 VIEWS); Future  - CBC with Auto Differential; Future  - Comprehensive Metabolic Panel; Future  - gabapentin (NEURONTIN) 300 MG capsule; Take 1 capsule by mouth 3 times daily for 30 days. Dispense: 90 capsule; Refill: 0      Orders Placed This Encounter   Procedures    XR HIP BILATERAL W AP PELVIS (2 VIEWS)     Standing Status:   Future     Number of Occurrences:   1     Standing Expiration Date:   10/11/2023    CBC with Auto Differential     Standing Status:   Future     Number of Occurrences:   1     Standing Expiration Date:   10/11/2023    Comprehensive Metabolic Panel     Standing Status:   Future     Number of Occurrences:   1     Standing Expiration Date:   10/11/2023           Return in about 1 month (around 11/11/2022) for Routine follow up - 20 minutes. Discussed use, benefit, and side effects of prescribed medications. All patient questions answered. Pt voiced understanding. Reviewed health maintenance. Instructedto continue current medications, diet and exercise. Patient agreed with treatmentplan.  Follow up as directed.     _______________________________________________________________      Past Medical History:   Diagnosis Date    Headache     Hypertension     Low back pain     Narcolepsy without cataplexy(347.00)     Obstructive sleep apnea     AHI:  29.0 per PSG, 2012    PONV (postoperative nausea and vomiting)       Past Surgical History:   Procedure Laterality Date    ABSCESS DRAINAGE      CHOLECYSTECTOMY, LAPAROSCOPIC N/A 9/28/2022    ROBOTIC INTERVAL APPENDECTOMY performed by Lilo Floyd DO at [address] N/A 08/15/2022    Dr Brenda Patel questionable erythema in the ileum, no ulcerations seen-AP x 1, HP x 1, 5 yr recall    LAPAROSCOPIC APPENDECTOMY N/A 06/21/2022    DIAGNOSTIC LAPAROSCOPY WITH DRAINAGE OF ABCESS AND DRAIN PLACEMENT performed by Alex Thompson DO at [address] [facility] History   Problem Relation Age of Onset    Diabetes Mother     Hypertension Mother     Asthma Mother     Coronary Art Dis Father     Heart Attack Father     Hypertension Father     Cancer Maternal Grandmother         kidney    Kidney Cancer Maternal Grandmother     Stroke Paternal Grandfather     Cancer Paternal Grandfather         lung    Lung Cancer Paternal Grandfather     Colon Cancer Neg Hx     Colon Polyps Neg Hx        Social History     Tobacco Use    Smoking status: Never    Smokeless tobacco: Never   Substance Use Topics    Alcohol use: Not Currently     Comment: twice a year social     Current Outpatient Medications   Medication Sig Dispense Refill    gabapentin (NEURONTIN) 300 MG capsule Take 1 capsule by mouth 3 times daily for 30 days. 90 capsule 0    naproxen (NAPROSYN) 500 MG tablet Take 1 tablet by mouth 2 times daily as needed for Pain 30 tablet 0    amphetamine-dextroamphetamine (ADDERALL XR) 30 MG extended release capsule Take 1 capsule by mouth in the morning and 1 capsule in the evening. Do all this for 30 days. 60 capsule 0    doxycycline monohydrate (MONODOX) 100 MG capsule Take 1 capsule by mouth daily 30 capsule 4    irbesartan (AVAPRO) 300 MG tablet Take 1 tablet by mouth daily 90 tablet 3     No current facility-administered medications for this visit.      No Known Allergies    Health Maintenance   Topic Date Due    COVID-19 Vaccine (1) Never done    Pneumococcal 0-64 years Vaccine (1 - PCV) Never done    Diabetic foot exam  Never done    HIV screen  Never done    Diabetic retinal exam  Never done    Hepatitis C screen  Never done    Hepatitis B vaccine (1 of 3 - Risk 3-dose series) Never done    DTaP/Tdap/Td vaccine (1 - Tdap) 05/02/2012    Flu vaccine (1) Never done    A1C test (Diabetic or Prediabetic)  02/25/2023    Diabetic microalbuminuria test  02/25/2023    Lipids  02/25/2023    Depression Screen  05/25/2023    Colorectal Cancer Screen  08/15/2027    Hepatitis A vaccine  Aged Out    Hib vaccine  Aged Out    Meningococcal (ACWY) vaccine  Aged Out       _______________________________________________________________    Note dictated using [id number] [facility]  Sometimes this dictation software makes erroneous transcriptions.

## 2022-10-13 ENCOUNTER — OFFICE VISIT (OUTPATIENT)
Dept: SURGERY | Age: 42
End: 2022-10-13

## 2022-10-13 ENCOUNTER — OFFICE VISIT (OUTPATIENT)
Dept: FAMILY MEDICINE CLINIC | Age: 42
End: 2022-10-13
Payer: COMMERCIAL

## 2022-10-13 VITALS
HEIGHT: 69 IN | WEIGHT: 292 LBS | SYSTOLIC BLOOD PRESSURE: 132 MMHG | BODY MASS INDEX: 43.25 KG/M2 | DIASTOLIC BLOOD PRESSURE: 88 MMHG | TEMPERATURE: 98.2 F

## 2022-10-13 VITALS
SYSTOLIC BLOOD PRESSURE: 126 MMHG | BODY MASS INDEX: 43.12 KG/M2 | HEART RATE: 96 BPM | OXYGEN SATURATION: 96 % | WEIGHT: 292 LBS | DIASTOLIC BLOOD PRESSURE: 92 MMHG | TEMPERATURE: 97.8 F

## 2022-10-13 DIAGNOSIS — K35.33 ACUTE APPENDICITIS WITH PERFORATION, LOCALIZED PERITONITIS, AND ABSCESS, WITHOUT GANGRENE: Primary | ICD-10-CM

## 2022-10-13 DIAGNOSIS — M25.551 RIGHT HIP PAIN: Primary | ICD-10-CM

## 2022-10-13 DIAGNOSIS — G47.419 NARCOLEPSY WITHOUT CATAPLEXY: ICD-10-CM

## 2022-10-13 DIAGNOSIS — L73.2 HIDRADENITIS: ICD-10-CM

## 2022-10-13 PROCEDURE — G8484 FLU IMMUNIZE NO ADMIN: HCPCS | Performed by: FAMILY MEDICINE

## 2022-10-13 PROCEDURE — 99024 POSTOP FOLLOW-UP VISIT: CPT | Performed by: SURGERY

## 2022-10-13 PROCEDURE — 99213 OFFICE O/P EST LOW 20 MIN: CPT | Performed by: FAMILY MEDICINE

## 2022-10-13 PROCEDURE — 1036F TOBACCO NON-USER: CPT | Performed by: FAMILY MEDICINE

## 2022-10-13 PROCEDURE — G8417 CALC BMI ABV UP PARAM F/U: HCPCS | Performed by: FAMILY MEDICINE

## 2022-10-13 PROCEDURE — G8427 DOCREV CUR MEDS BY ELIG CLIN: HCPCS | Performed by: FAMILY MEDICINE

## 2022-10-13 RX ORDER — DEXTROAMPHETAMINE SACCHARATE, AMPHETAMINE ASPARTATE MONOHYDRATE, DEXTROAMPHETAMINE SULFATE AND AMPHETAMINE SULFATE 7.5; 7.5; 7.5; 7.5 MG/1; MG/1; MG/1; MG/1
30 CAPSULE, EXTENDED RELEASE ORAL 2 TIMES DAILY
Qty: 60 CAPSULE | Refills: 0 | Status: SHIPPED | OUTPATIENT
Start: 2022-10-15 | End: 2022-11-14

## 2022-10-13 ASSESSMENT — ENCOUNTER SYMPTOMS
NAUSEA: 0
EYE DISCHARGE: 0
EYE REDNESS: 0
SHORTNESS OF BREATH: 0
FACIAL SWELLING: 0
EYE PAIN: 0
BACK PAIN: 0
CONSTIPATION: 0
DIARRHEA: 0
COUGH: 0
ABDOMINAL PAIN: 1
CHEST TIGHTNESS: 0
WHEEZING: 0

## 2022-10-13 NOTE — PROGRESS NOTES
[facility] PHYSICIAN SERVICES  [facility] FAMILY MEDICINE  [id number] [place] [address] [address] [address]  Dept: [phone]  Dept Fax: [phone]  Loc: [phone]    Colten Lebron is a 42 y.o. male who presents today for his medical conditions/complaints as noted below. Colten Lebron is here for Follow-up      He was last here on August 11 complaining of right hip pain. X-ray showed no radiographic evidence of fracture or bony lesions. He was given naproxen 500 mg twice daily  Is also placed on gabapentin 300 mg 3 times daily  He feels the naproxen is helping but the gabapentin makes him drowsy. Overall he is feeling at least 50% better    HPI    Subjective:      Review of Systems   Musculoskeletal:  Positive for arthralgias and myalgias. Negative for back pain, gait problem and joint swelling. SeeHPI for visit specific review of symptoms. All others negative      Objective:   BP (!) 126/92   Pulse 96   Temp 97.8 °F (36.6 °C)   Wt 292 lb (132.5 kg)   SpO2 96%   BMI 43.12 kg/m²   Physical Exam  MSK: Normal gait.   Full range of motion bilateral hips    Recent Results (from the past 672 hour(s))   EKG 12 Lead    Collection Time: 09/26/22  9:30 AM   Result Value Ref [place]    P-R Interval 138 ms    QRS Duration 96 ms    Q-T Interval 373 ms    QTc Calculation (Bazett) 449 ms    P Axis 9 degrees    T Axis 54 degrees   Comprehensive Metabolic Panel    Collection Time: 10/11/22  8:55 AM   Result Value Ref [place]    Sodium 142 136 - 145 mmol/L    Potassium 4.7 3.5 - 5.0 mmol/L    Chloride 104 98 - 111 mmol/L    CO2 27 22 - 29 mmol/L    Anion Gap 11 7 - 19 mmol/L    Glucose 116 (H) 74 - 109 mg/dL    BUN 20 6 - 20 mg/dL    Creatinine 0.8 0.5 - 1.2 mg/dL    GFR Non-African American >60 >60    GFR African American >59 >59    Calcium 9.4 8.6 - 10.0 mg/dL    Total Protein 7.0 6.6 - 8.7 g/dL    Albumin 4.1 3.5 - 5.2 g/dL    Total Bilirubin 0.4 0.2 - 1.2 mg/dL    Alkaline Phosphatase 66 40 - 130 U/L    ALT 17 5 - 41 U/L    AST 14 5 - 40 U/L   CBC with Auto Differential    Collection Time: 10/11/22  8:55 AM   Result Value Ref [place]    WBC 9.7 4.8 - 10.8 K/uL    RBC 5.46 4.70 - 6.10 M/uL    Hemoglobin 16.3 14.0 - 18.0 g/dL    Hematocrit 51.4 42.0 - 52.0 %    MCV 94.1 (H) 80.0 - 94.0 fL    MCH 29.9 27.0 - 31.0 pg    MCHC 31.7 (L) 33.0 - 37.0 g/dL    RDW 13.6 11.5 - 14.5 %    Platelets 565 320 - 842 K/uL    MPV 10.3 9.4 - 12.4 fL    Neutrophils % 47.1 (L) 50.0 - 65.0 %    Lymphocytes % 40.2 (H) 20.0 - 40.0 %    Monocytes % 9.9 0.0 - 10.0 %    Eosinophils % 1.3 0.0 - 5.0 %    Basophils % 0.9 0.0 - 1.0 %    Neutrophils Absolute 4.5 1.5 - 7.5 K/uL    Immature Granulocytes # 0.1 K/uL    Lymphocytes Absolute 3.9 1.1 - 4.5 K/uL    Monocytes Absolute 1.00 (H) 0.00 - 0.90 K/uL    Eosinophils Absolute 0.10 0.00 - 0.60 K/uL    Basophils Absolute 0.10 0.00 - 0.20 K/uL   Urine Drug Screen    Collection Time: 10/11/22 12:36 PM   Result Value Ref [place]    Amphetamine Screen, Urine Negative Negative <1000 ng/mL    Barbiturate Screen, Ur Negative Negative < 200 ng/mL    Benzodiazepine Screen, Urine Negative Negative <100 ng/mL    Cannabinoid Scrn, Ur Negative Negative <50 ng/mL    Cocaine Metabolite Screen, Urine Negative Negative <300 ng/mL    Opiate Scrn, Ur Negative Negative < 300 ng/mL    PCP Screen, Urine Negative Negative <25 ng/mL    Methadone Screen, Urine Negative Negative <300 ng/mL    Propoxyphene Scrn, Ur Negative Negative <300 ng/mL    Oxycodone Urine POSITIVE (A)     Tricyclic Negative     Drug Screen Comment: see below    Urinalysis with Microscopic    Collection Time: 10/11/22 12:36 PM   Result Value Ref [place]    Color, UA YELLOW Straw/Yellow    Clarity, UA CLOUDY (A) Clear    Glucose, Ur Negative Negative mg/dL    Bilirubin Urine Negative Negative    Ketones, Urine Negative Negative mg/dL    Specific Gravity, UA 1.021 1.005 - 1.030    Blood, Urine Negative Negative    pH, UA 6.5 5.0 - 8.0    Protein, UA Negative Negative mg/dL    Urobilinogen, Urine 0.2 <2.0 E.U./dL    Nitrite, Urine Negative Negative    Leukocyte Esterase, Urine Negative Negative    Bacteria, UA NEGATIVE (A) None Seen /HPF    Crystals, UA 1+ Ca. Oxalate (A) None Seen /HPF    Hyaline Casts, UA 0 0 - 8 /HPF    WBC, UA 1 0 - 5 /HPF    RBC, UA 3 0 - 4 /HPF    Epithelial Cells, UA 1 0 - 5 /HPF               Assessment & Plan: The following diagnoses and conditions are stable with no further orders unless indicated:  1. Right hip pain  Overall he is doing considerably better in a short period of time. Continue with naproxen. - we discussed other options which include:  -- pain management  -- physical therapy   No orders of the defined types were placed in this encounter. Return in about 3 months (around 1/13/2023), or if symptoms worsen or fail to improve, for Routine follow up - 20 minutes. Discussed use, benefit, and side effects of prescribed medications. All patient questions answered. Pt voiced understanding. Reviewed health maintenance. Instructedto continue current medications, diet and exercise. Patient agreed with treatmentplan.  Follow up as directed.     _______________________________________________________________      Past Medical History:   Diagnosis Date    Headache     Hypertension     Low back pain     Narcolepsy without cataplexy(347.00)     Obstructive sleep apnea     AHI:  29.0 per PSG, 2012    PONV (postoperative nausea and vomiting)       Past Surgical History:   Procedure Laterality Date    ABSCESS DRAINAGE      CHOLECYSTECTOMY, LAPAROSCOPIC N/A 9/28/2022    ROBOTIC INTERVAL APPENDECTOMY performed by Fausto Bond DO at [address] N/A 08/15/2022    Dr Bahman Whittaker questionable erythema in the ileum, no ulcerations seen-AP x 1, HP x 1, 5 yr recall    LAPAROSCOPIC APPENDECTOMY N/A 06/21/2022    DIAGNOSTIC LAPAROSCOPY WITH DRAINAGE OF ABCESS AND DRAIN PLACEMENT performed by Fausto Bond DO at [facility] OR       Family History   Problem Relation Age of Onset    Diabetes Mother     Hypertension Mother     Asthma Mother     Coronary Art Dis Father     Heart Attack Father     Hypertension Father     Cancer Maternal Grandmother         kidney    Kidney Cancer Maternal Grandmother     Stroke Paternal Grandfather     Cancer Paternal Grandfather         lung    Lung Cancer Paternal Grandfather     Colon Cancer Neg Hx     Colon Polyps Neg Hx        Social History     Tobacco Use    Smoking status: Never    Smokeless tobacco: Never   Substance Use Topics    Alcohol use: Not Currently     Comment: twice a year social     Current Outpatient Medications   Medication Sig Dispense Refill    gabapentin (NEURONTIN) 300 MG capsule Take 1 capsule by mouth 3 times daily for 30 days. 90 capsule 0    naproxen (NAPROSYN) 500 MG tablet Take 1 tablet by mouth 2 times daily as needed for Pain 30 tablet 0    amphetamine-dextroamphetamine (ADDERALL XR) 30 MG extended release capsule Take 1 capsule by mouth in the morning and 1 capsule in the evening. Do all this for 30 days. 60 capsule 0    doxycycline monohydrate (MONODOX) 100 MG capsule Take 1 capsule by mouth daily 30 capsule 4    irbesartan (AVAPRO) 300 MG tablet Take 1 tablet by mouth daily 90 tablet 3     No current facility-administered medications for this visit.      No Known Allergies    Health Maintenance   Topic Date Due    COVID-19 Vaccine (1) Never done    Pneumococcal 0-64 years Vaccine (1 - PCV) Never done    Diabetic foot exam  Never done    HIV screen  Never done    Diabetic retinal exam  Never done    Hepatitis C screen  Never done    Hepatitis B vaccine (1 of 3 - Risk 3-dose series) Never done    DTaP/Tdap/Td vaccine (1 - Tdap) 05/02/2012    Flu vaccine (1) Never done    A1C test (Diabetic or Prediabetic)  02/25/2023    Diabetic microalbuminuria test  02/25/2023    Lipids  02/25/2023    Depression Screen  05/25/2023    Colorectal Cancer Screen  08/15/2027    Hepatitis A vaccine  Aged Out    Hib vaccine  Aged Out    Meningococcal (ACWY) vaccine  Aged Out       _______________________________________________________________    Note dictated using Dragon Dictation software  Sometimes this dictation software makes erroneous transcriptions.

## 2022-10-13 NOTE — TELEPHONE ENCOUNTER
Requested Prescriptions     Pending Prescriptions Disp Refills    amphetamine-dextroamphetamine (ADDERALL XR) 30 MG extended release capsule 60 capsule 0     Sig: Take 1 capsule by mouth in the morning and 1 capsule in the evening. Do all this for 30 days.        Last Office Visit:  8/8/2022  Next Office Visit:  2/20/2023  Last Medication Refill:  9/15/22  Luann Mayo up to date:  9/27/22    *RX updated to reflect   10/15/22  fill date*

## 2022-10-13 NOTE — PROGRESS NOTES
SUBJECTIVE:  Mr. Monica Mccann is a 42 y.o. male who presents today for follow-up for interval robotic appendectomy. Patient doing well. Still having some left leg pain yet has postop DVT study was negative. Has seen his PCP and was started on anti-inflammatories and gabapentin. Does note issues with hidradenitis. Patient's medications, allergies, past medical, surgical, social and family histories werereviewed and updated as appropriate. Review of Systems   Constitutional:  Positive for fatigue. Negative for chills, diaphoresis and fever. HENT:  Negative for ear discharge, ear pain, facial swelling and nosebleeds. Eyes:  Negative for pain, discharge and redness. Respiratory:  Negative for cough, chest tightness, shortness of breath and wheezing. Cardiovascular:  Negative for chest pain and palpitations. Gastrointestinal:  Positive for abdominal pain. Negative for constipation, diarrhea and nausea. Genitourinary:  Negative for difficulty urinating, flank pain and hematuria. Musculoskeletal:  Negative for neck pain and neck stiffness. Neurological:  Negative for dizziness, numbness and headaches. Psychiatric/Behavioral:  Negative for agitation, behavioral problems and self-injury. OBJECTIVE:  /88 (Site: Right Upper Arm, Position: Sitting, Cuff Size: Large Adult)   Temp 98.2 °F (36.8 °C) (Temporal)   Ht 5' 9\" (1.753 m)   Wt 292 lb (132.5 kg)   BMI 43.12 kg/m²   Physical Exam  Vitals reviewed. Constitutional:       Appearance: Normal appearance. He is obese. HENT:      Head: Normocephalic and atraumatic. Right Ear: External ear normal.      Left Ear: External ear normal.      Nose: Nose normal.   Eyes:      Extraocular Movements: Extraocular movements intact. Pulmonary:      Effort: Pulmonary effort is normal. No respiratory distress. Abdominal:      General: There is no distension. Palpations: Abdomen is soft. Tenderness: There is no abdominal tenderness.

## 2022-10-20 ENCOUNTER — TELEPHONE (OUTPATIENT)
Dept: NEUROLOGY | Age: 42
End: 2022-10-20

## 2022-10-20 NOTE — TELEPHONE ENCOUNTER
Patient called to speak with a nurse regarding places to get a cpap machine that takes insurance. Please return call to advise.

## 2022-10-20 NOTE — TELEPHONE ENCOUNTER
Tried calling patient back. NO answer VM is full. Patient will need to call his [employer] and ask them what DME company here in [place] is in his network with his insurance. We will not know that information in our office.

## 2022-10-21 RX ORDER — IRBESARTAN 300 MG/1
300 TABLET ORAL DAILY
Qty: 90 TABLET | Refills: 3 | Status: SHIPPED | OUTPATIENT
Start: 2022-10-21

## 2022-10-21 RX ORDER — IRBESARTAN 300 MG/1
TABLET ORAL
Qty: 90 TABLET | Refills: 3 | OUTPATIENT
Start: 2022-10-21

## 2022-11-07 DIAGNOSIS — L73.2 HYDRADENITIS: ICD-10-CM

## 2022-11-07 RX ORDER — DOXYCYCLINE 100 MG/1
CAPSULE ORAL
Qty: 30 CAPSULE | Refills: 4 | Status: SHIPPED | OUTPATIENT
Start: 2022-11-07

## 2022-11-11 ENCOUNTER — OFFICE VISIT (OUTPATIENT)
Dept: FAMILY MEDICINE CLINIC | Age: 42
End: 2022-11-11
Payer: COMMERCIAL

## 2022-11-11 VITALS
HEART RATE: 94 BPM | OXYGEN SATURATION: 98 % | TEMPERATURE: 97.4 F | WEIGHT: 301 LBS | BODY MASS INDEX: 44.45 KG/M2 | DIASTOLIC BLOOD PRESSURE: 88 MMHG | SYSTOLIC BLOOD PRESSURE: 112 MMHG

## 2022-11-11 DIAGNOSIS — M54.41 CHRONIC MIDLINE LOW BACK PAIN WITH BILATERAL SCIATICA: ICD-10-CM

## 2022-11-11 DIAGNOSIS — G89.29 CHRONIC MIDLINE THORACIC BACK PAIN: ICD-10-CM

## 2022-11-11 DIAGNOSIS — M54.42 CHRONIC MIDLINE LOW BACK PAIN WITH BILATERAL SCIATICA: ICD-10-CM

## 2022-11-11 DIAGNOSIS — M25.551 RIGHT HIP PAIN: ICD-10-CM

## 2022-11-11 DIAGNOSIS — G47.33 OBSTRUCTIVE SLEEP APNEA: ICD-10-CM

## 2022-11-11 DIAGNOSIS — L73.2 HIDRADENITIS: ICD-10-CM

## 2022-11-11 DIAGNOSIS — E66.01 CLASS 3 SEVERE OBESITY DUE TO EXCESS CALORIES WITH SERIOUS COMORBIDITY AND BODY MASS INDEX (BMI) OF 40.0 TO 44.9 IN ADULT (HCC): ICD-10-CM

## 2022-11-11 DIAGNOSIS — M54.6 CHRONIC MIDLINE THORACIC BACK PAIN: ICD-10-CM

## 2022-11-11 DIAGNOSIS — Z99.89 CPAP (CONTINUOUS POSITIVE AIRWAY PRESSURE) DEPENDENCE: ICD-10-CM

## 2022-11-11 DIAGNOSIS — I10 ESSENTIAL (PRIMARY) HYPERTENSION: Primary | ICD-10-CM

## 2022-11-11 DIAGNOSIS — G89.29 CHRONIC MIDLINE LOW BACK PAIN WITH BILATERAL SCIATICA: ICD-10-CM

## 2022-11-11 DIAGNOSIS — G47.419 NARCOLEPSY WITHOUT CATAPLEXY: ICD-10-CM

## 2022-11-11 PROBLEM — M53.3 COCCYDYNIA: Status: RESOLVED | Noted: 2019-01-14 | Resolved: 2022-11-11

## 2022-11-11 PROBLEM — K35.33 ACUTE APPENDICITIS WITH PERFORATION, LOCALIZED PERITONITIS, AND ABSCESS: Status: RESOLVED | Noted: 2022-06-23 | Resolved: 2022-11-11

## 2022-11-11 PROBLEM — K35.80 ACUTE APPENDICITIS: Status: RESOLVED | Noted: 2022-06-21 | Resolved: 2022-11-11

## 2022-11-11 PROCEDURE — 3074F SYST BP LT 130 MM HG: CPT | Performed by: FAMILY MEDICINE

## 2022-11-11 PROCEDURE — 99214 OFFICE O/P EST MOD 30 MIN: CPT | Performed by: FAMILY MEDICINE

## 2022-11-11 PROCEDURE — G8427 DOCREV CUR MEDS BY ELIG CLIN: HCPCS | Performed by: FAMILY MEDICINE

## 2022-11-11 PROCEDURE — 3078F DIAST BP <80 MM HG: CPT | Performed by: FAMILY MEDICINE

## 2022-11-11 PROCEDURE — 1036F TOBACCO NON-USER: CPT | Performed by: FAMILY MEDICINE

## 2022-11-11 PROCEDURE — G8484 FLU IMMUNIZE NO ADMIN: HCPCS | Performed by: FAMILY MEDICINE

## 2022-11-11 PROCEDURE — G8417 CALC BMI ABV UP PARAM F/U: HCPCS | Performed by: FAMILY MEDICINE

## 2022-11-11 RX ORDER — NAPROXEN 500 MG/1
500 TABLET ORAL 2 TIMES DAILY PRN
Qty: 30 TABLET | Refills: 5 | Status: SHIPPED | OUTPATIENT
Start: 2022-11-11

## 2022-11-11 RX ORDER — DEXTROAMPHETAMINE SACCHARATE, AMPHETAMINE ASPARTATE MONOHYDRATE, DEXTROAMPHETAMINE SULFATE AND AMPHETAMINE SULFATE 7.5; 7.5; 7.5; 7.5 MG/1; MG/1; MG/1; MG/1
30 CAPSULE, EXTENDED RELEASE ORAL 2 TIMES DAILY
Qty: 60 CAPSULE | Refills: 0 | Status: SHIPPED | OUTPATIENT
Start: 2022-11-14 | End: 2022-12-14

## 2022-11-11 RX ORDER — GABAPENTIN 300 MG/1
300 CAPSULE ORAL 3 TIMES DAILY
Qty: 90 CAPSULE | Refills: 1 | Status: SHIPPED | OUTPATIENT
Start: 2022-11-11 | End: 2022-12-11

## 2022-11-11 ASSESSMENT — ENCOUNTER SYMPTOMS
DIARRHEA: 0
NAUSEA: 0
CONSTIPATION: 0
ANAL BLEEDING: 0
COUGH: 0
ABDOMINAL PAIN: 0
CHEST TIGHTNESS: 0
SHORTNESS OF BREATH: 0

## 2022-11-11 NOTE — TELEPHONE ENCOUNTER
Requested Prescriptions     Pending Prescriptions Disp Refills    amphetamine-dextroamphetamine (ADDERALL XR) 30 MG extended release capsule 60 capsule 0     Sig: Take 1 capsule by mouth in the morning and 1 capsule in the evening. Do all this for 30 days.        Last Office Visit:  8/8/2022  Next Office Visit:  2/20/2023  Last Medication Refill:  10/15/22  Priscilla Acuña up to date:  9/27/22    *RX updated to reflect   11/14/22  fill date*

## 2022-11-11 NOTE — PROGRESS NOTES
[facility] PHYSICIAN SERVICES  [facility] FAMILY MEDICINE  [address] [facility] [address] [address]  Dept: [phone]  Dept Fax: [phone]  Loc: [phone]    Tani Walls is a 36 y.o. male who presents today for his medical conditions/complaints as noted below. Tani Walls is here for 1 Month Follow-Up        HPI:   CC: Here today to discuss the following:  Has been experiencing right hip pain since October 3.  -Pain has been described as moderate to severe, sharp and stabbing, constantly hurts. Was last here October 13 complaining of lower back and right hip pain. -X-ray of the hip showed no evidence of fracture or dislocation. Bilateral femoral heads well conjugated within the acetabulum. No significant degenerative changes.  -He found significant improvement with naproxen.  -He has received stretching and strengthening exercises which have failed to improve his discomfort.  -At this point, he states he is only able to drive about 30 minutes before he has to get out and stretch. The pain is also interfering with his sleep. He followed up with his general surgeon on October 13 for appendicitis status post appendectomy.  -No postop complications. He was experiencing issues with hidradenitis. His general surgeon discussed possible dermatology referral and management with Humira  -He was placed on prophylaxis with doxycycline    Hypertension  Compliant with medications. No adverse effects from medication. No lightheadedness, palpitations, or chest pain. HPI    Subjective:      Review of Systems   Constitutional:  Negative for chills and fever. HENT:  Negative for congestion. Respiratory:  Negative for cough, chest tightness and shortness of breath. Cardiovascular:  Negative for chest pain, palpitations and leg swelling. Gastrointestinal:  Negative for abdominal pain, anal bleeding, constipation, diarrhea and nausea.    Genitourinary:  Negative for difficulty urinating. Psychiatric/Behavioral: Negative. SeeHPI for visit specific review of symptoms. All others negative      Objective:   /88   Pulse 94   Temp 97.4 °F (36.3 °C)   Wt (!) 301 lb (136.5 kg)   SpO2 98%   BMI 44.45 kg/m²   Physical Exam  Constitutional:       Appearance: He is well-developed. He is not ill-appearing. Cardiovascular:      Rate and Rhythm: Normal rate and regular rhythm. Heart sounds: No murmur heard. No friction rub. Pulmonary:      Effort: Pulmonary effort is normal. No respiratory distress. Breath sounds: Normal breath sounds. No wheezing or rales. Abdominal:      General: There is no distension. Palpations: Abdomen is soft. Tenderness: There is no abdominal tenderness. Musculoskeletal:      Cervical back: Neck supple. No tenderness. Lymphadenopathy:      Cervical: No cervical adenopathy. Neurological:      Mental Status: He is alert. Psychiatric:         Behavior: Behavior normal.   MSK: Antalgic gait. Pain with external and internal rotation of the right hip. Negative straight leg raise bilaterally. No bony tenderness      No results found for this or any previous visit (from the past 672 hour(s)). Assessment & Plan: The following diagnoses and conditions are stable with no further orders unless indicated:  1. Essential (primary) hypertension  Current medication:  Irbesartan 300 mg daily    BP Readings from Last 3 Encounters:   11/11/22 112/88   10/13/22 132/88   10/13/22 (!) 126/92     Blood pressure stable    2. Chronic midline low back pain with bilateral sciatica  Previous medications:  Naproxen 500 mg twice daily as needed  Gabapentin 300 mg 3 times daily  Continue with the above medications. They do not appear to be resolving his hip pain dermatology referral at this time  3. Chronic midline thoracic back pain  As above    4.  Class 3 severe obesity due to excess calories with serious comorbidity and body mass index (BMI) of 40.0 to 44.9 in adult [facility])  He was able to drop his weight from 370 pounds to 292. Encourage lifestyle changes including diet and increased activity    5. CPAP (continuous positive airway pressure) dependence  6. Obstructive sleep apnea  7. Narcolepsy without cataplexy  Continues to be followed by neurology. 8. Hidradenitis  he wishes to continue with the doxycycline for the time being. He is missed a lot of work related to his recent appendectomy and does not wish to pursue     9. Right hip pain  -X-ray shows no bony abnormalities  -Combination of naproxen and gabapentin have not resolved his discomfort.  -He tried 6 weeks of physician directed home physical therapy without relief  Suggest to get an MRI of the right hip and anticipate referral to orthopedist  Orders Placed This Encounter   Procedures    MRI HIP RIGHT WO CONTRAST     Standing Status:   Future     Standing Expiration Date:   11/11/2023           Return in about 6 months (around 5/11/2023) for Routine follow up - 20 minutes. Discussed use, benefit, and side effects of prescribed medications. All patient questions answered. Pt voiced understanding. Reviewed health maintenance. Instructedto continue current medications, diet and exercise. Patient agreed with treatmentplan.  Follow up as directed.     _______________________________________________________________      Past Medical History:   Diagnosis Date    Acute appendicitis 6/21/2022    Acute appendicitis with perforation, localized peritonitis, and abscess 6/23/2022    Headache     Hypertension     Low back pain     Narcolepsy without cataplexy(347.00)     Obstructive sleep apnea     AHI:  29.0 per PSG, 2012    PONV (postoperative nausea and vomiting)       Past Surgical History:   Procedure Laterality Date    ABSCESS DRAINAGE      CHOLECYSTECTOMY, LAPAROSCOPIC N/A 9/28/2022    ROBOTIC INTERVAL APPENDECTOMY performed by Ray Gasca DO at [address] [place] [address] COLONOSCOPY N/A 08/15/2022    Dr Giana Kennedy questionable erythema in the ileum, no ulcerations seen-AP x 1, HP x 1, 5 yr recall    LAPAROSCOPIC APPENDECTOMY N/A 06/21/2022    DIAGNOSTIC LAPAROSCOPY WITH DRAINAGE OF ABCESS AND DRAIN PLACEMENT performed by Darlean Phoenix, DO at [facility] OR       Family History   Problem Relation Age of Onset    Diabetes Mother     Hypertension Mother     Asthma Mother     Coronary Art Dis Father     Heart Attack Father     Hypertension Father     Cancer Maternal Grandmother         kidney    Kidney Cancer Maternal Grandmother     Stroke Paternal Grandfather     Cancer Paternal Grandfather         lung    Lung Cancer Paternal Grandfather     Colon Cancer Neg Hx     Colon Polyps Neg Hx        Social History     Tobacco Use    Smoking status: Never    Smokeless tobacco: Never   Substance Use Topics    Alcohol use: Not Currently     Comment: twice a year social     Current Outpatient Medications   Medication Sig Dispense Refill    naproxen (NAPROSYN) 500 MG tablet Take 1 tablet by mouth 2 times daily as needed for Pain 30 tablet 5    gabapentin (NEURONTIN) 300 MG capsule Take 1 capsule by mouth 3 times daily for 30 days. 90 capsule 1    doxycycline monohydrate (MONODOX) 100 MG capsule TAKE 1 CAPSULE BY MOUTH EVERY DAY 30 capsule 4    irbesartan (AVAPRO) 300 MG tablet Take 1 tablet by mouth daily 90 tablet 3    amphetamine-dextroamphetamine (ADDERALL XR) 30 MG extended release capsule Take 1 capsule by mouth in the morning and 1 capsule in the evening. Do all this for 30 days. 60 capsule 0     No current facility-administered medications for this visit.      No Known Allergies    Health Maintenance   Topic Date Due    COVID-19 Vaccine (1) Never done    Pneumococcal 0-64 years Vaccine (1 - PCV) Never done    Diabetic foot exam  Never done    HIV screen  Never done    Diabetic retinal exam  Never done    Hepatitis C screen  Never done    Hepatitis B vaccine (1 of 3 - Risk 3-dose series) Never done    DTaP/Tdap/Td vaccine (1 - Tdap) 05/02/2012    Flu vaccine (1) Never done    A1C test (Diabetic or Prediabetic)  02/25/2023    Diabetic microalbuminuria test  02/25/2023    Lipids  02/25/2023    Depression Screen  05/25/2023    Colorectal Cancer Screen  08/15/2027    Hepatitis A vaccine  Aged Out    Hib vaccine  Aged Out    Meningococcal (ACWY) vaccine  Aged Out       _______________________________________________________________    Note dictated using Dragon Dictation software  Sometimes this dictation software makes erroneous transcriptions.

## 2022-11-15 ENCOUNTER — TELEPHONE (OUTPATIENT)
Dept: FAMILY MEDICINE CLINIC | Age: 42
End: 2022-11-15

## 2022-11-15 NOTE — TELEPHONE ENCOUNTER
Pt called and states at his last visit you discussed starting on Ozempic? Pt wants to try it now.  [facility]

## 2022-12-13 DIAGNOSIS — M25.551 RIGHT HIP PAIN: ICD-10-CM

## 2022-12-13 DIAGNOSIS — G47.419 NARCOLEPSY WITHOUT CATAPLEXY: ICD-10-CM

## 2022-12-13 RX ORDER — DEXTROAMPHETAMINE SACCHARATE, AMPHETAMINE ASPARTATE MONOHYDRATE, DEXTROAMPHETAMINE SULFATE AND AMPHETAMINE SULFATE 7.5; 7.5; 7.5; 7.5 MG/1; MG/1; MG/1; MG/1
30 CAPSULE, EXTENDED RELEASE ORAL 2 TIMES DAILY
Qty: 60 CAPSULE | Refills: 0 | Status: SHIPPED | OUTPATIENT
Start: 2022-12-14 | End: 2023-01-13

## 2022-12-16 ENCOUNTER — TELEPHONE (OUTPATIENT)
Dept: FAMILY MEDICINE CLINIC | Age: 42
End: 2022-12-16

## 2022-12-16 NOTE — TELEPHONE ENCOUNTER
----- Message from Daja Regan MD sent at 12/15/2022  2:19 PM CST -----  Reviewed with MRI of his right hip does show small fluid accumulation and some mild degeneration. There also appears to be a mild degenerative tearing of the hamstring which may be contributing to his discomfort.   My advice would be to speak to an orthopedist.  If he is agreeable I can refer him to the orthopedic [place]

## 2023-01-12 DIAGNOSIS — G47.419 NARCOLEPSY WITHOUT CATAPLEXY: ICD-10-CM

## 2023-01-12 NOTE — TELEPHONE ENCOUNTER
Requested Prescriptions     Pending Prescriptions Disp Refills    amphetamine-dextroamphetamine (ADDERALL XR) 30 MG extended release capsule 60 capsule 0     Sig: Take 1 capsule by mouth in the morning and 1 capsule in the evening. Do all this for 30 days.        Last Office Visit:  8/8/2022  Next Office Visit:  2/20/2023  Last Medication Refill:  12/14/22  Jose Chaves up to date:  12/13/22    *RX updated to reflect   1/12/23  fill date*
Yes

## 2023-01-13 RX ORDER — DEXTROAMPHETAMINE SACCHARATE, AMPHETAMINE ASPARTATE MONOHYDRATE, DEXTROAMPHETAMINE SULFATE AND AMPHETAMINE SULFATE 7.5; 7.5; 7.5; 7.5 MG/1; MG/1; MG/1; MG/1
30 CAPSULE, EXTENDED RELEASE ORAL 2 TIMES DAILY
Qty: 60 CAPSULE | Refills: 0 | Status: SHIPPED | OUTPATIENT
Start: 2023-01-13 | End: 2023-02-12

## 2023-01-19 ENCOUNTER — TELEPHONE (OUTPATIENT)
Dept: NEUROLOGY | Age: 43
End: 2023-01-19

## 2023-01-20 NOTE — TELEPHONE ENCOUNTER
Called the pharmacy to verify what is needed. They stated that since they are out of stock the patient is wanting to get the brand name Adderall and wants us to submit a PA for it. I informed the pharmacist that we will not do a PA on the brand name and we will let the patient know that he will need to call around and find a pharmacy that has it in stock. She stated she told him the same thing thing about calling around. Lizbeth Pearce tried to call the patient to let him know but his mailbox is full. He will have to call around and find a pharmacy that has it in stock and let us know so we can send a new prescription.

## 2023-02-20 ENCOUNTER — OFFICE VISIT (OUTPATIENT)
Dept: NEUROLOGY | Age: 43
End: 2023-02-20

## 2023-02-20 VITALS
HEART RATE: 100 BPM | OXYGEN SATURATION: 98 % | BODY MASS INDEX: 45.18 KG/M2 | HEIGHT: 69 IN | DIASTOLIC BLOOD PRESSURE: 94 MMHG | WEIGHT: 305 LBS | SYSTOLIC BLOOD PRESSURE: 146 MMHG

## 2023-02-20 DIAGNOSIS — G47.419 NARCOLEPSY WITHOUT CATAPLEXY: Primary | ICD-10-CM

## 2023-02-20 DIAGNOSIS — G47.33 OBSTRUCTIVE SLEEP APNEA: ICD-10-CM

## 2023-02-20 RX ORDER — DEXTROAMPHETAMINE SACCHARATE, AMPHETAMINE ASPARTATE MONOHYDRATE, DEXTROAMPHETAMINE SULFATE AND AMPHETAMINE SULFATE 7.5; 7.5; 7.5; 7.5 MG/1; MG/1; MG/1; MG/1
30 CAPSULE, EXTENDED RELEASE ORAL 2 TIMES DAILY
Qty: 60 CAPSULE | Refills: 0 | Status: SHIPPED | OUTPATIENT
Start: 2023-02-20 | End: 2023-03-22

## 2023-02-20 NOTE — PROGRESS NOTES
[facility] Neurology and Sleep  [address] [place] [address] Drive, [address] [address]  Citlaly Roldan  Phone [phone]  Fax [phone]     [address], [address] Follow Up Encounter  23 2:44 PM CST    Information:   Patient Name: Beata Moran  :   1980  Age:   36 y.o. MRN:   [id number]  Account #:  [de-identified]  Today:  23    Provider: Johanny Peacock M.D. Chief Complaint:   Chief Complaint   Patient presents with    Follow-up    Sleep Problem     Pt following up for Narcolepsy without cataplexy. Subjective:   Beata Moran is a 36 y.o. man with a history of narcolepsy and obstructive sleep apnea who is following up. He had a ruptured appendix in September, his second emergency abdominal surgery in a couple months. He takes Adderall for his narcolepsy. It works fairly well. He has had no side effects. He has tried Ritalin, Dexadrine, Xyrem, and Provigil in the past. He is doing fairly well lately. He does not use his CPAP. His machine is old.       Objective:     Past Medical History:  Past Medical History:   Diagnosis Date    Acute appendicitis 2022    Acute appendicitis with perforation, localized peritonitis, and abscess 2022    Headache     Hypertension     Low back pain     Narcolepsy without cataplexy(347.00)     Obstructive sleep apnea     AHI:  29.0 per PSG,     PONV (postoperative nausea and vomiting)        Past Surgical History:   Procedure Laterality Date    ABSCESS DRAINAGE      CHOLECYSTECTOMY, LAPAROSCOPIC N/A 2022    ROBOTIC INTERVAL APPENDECTOMY performed by Peter Guzman DO at [address] [place] [address] N/A 08/15/2022    Dr Lesley López questionable erythema in the ileum, no ulcerations seen-AP x 1, HP x 1, 5 yr recall    LAPAROSCOPIC APPENDECTOMY N/A 2022    DIAGNOSTIC LAPAROSCOPY WITH DRAINAGE OF ABCESS AND DRAIN PLACEMENT performed by Peter Guzman DO at [facility]  None    Significant Injuries  None    Habits  Beata Moran reports that he has never smoked. He has never used smokeless tobacco. He reports that he does not currently use alcohol. He reports that he does not use drugs. Family History   Problem Relation Age of Onset    Diabetes Mother     Hypertension Mother     Asthma Mother     Coronary Art Dis Father     Heart Attack Father     Hypertension Father     Cancer Maternal Grandmother         kidney    Kidney Cancer Maternal Grandmother     Stroke Paternal Grandfather     Cancer Paternal Grandfather         lung    Lung Cancer Paternal Grandfather     Colon Cancer Neg Hx     Colon Polyps Neg Hx        Social History  Norlin Baumgarten is , live in [place], [place], and works as an  at [employer].    Medications:  Current Outpatient Medications   Medication Sig Dispense Refill    amphetamine-dextroamphetamine (ADDERALL XR) 30 MG extended release capsule Take 1 capsule by mouth in the morning and 1 capsule in the evening. Do all this for 30 days. 60 capsule 0    Semaglutide,0.25 or 0.5MG/DOS, 2 MG/1.5ML SOPN Inject 0.5 mg into the skin every 7 days 1 Adjustable Dose Pre-filled Pen Syringe 5    naproxen (NAPROSYN) 500 MG tablet Take 1 tablet by mouth 2 times daily as needed for Pain 30 tablet 5    gabapentin (NEURONTIN) 300 MG capsule Take 1 capsule by mouth 3 times daily for 30 days. (Patient taking differently: Take 300 mg by mouth as needed.) 90 capsule 1    doxycycline monohydrate (MONODOX) 100 MG capsule TAKE 1 CAPSULE BY MOUTH EVERY DAY 30 capsule 4    irbesartan (AVAPRO) 300 MG tablet Take 1 tablet by mouth daily 90 tablet 3     No current facility-administered medications for this visit. Allergies:   Allergies as of 02/20/2023    (No Known Allergies)       Review of Systems:  Constitutional: negative for - chills and fever  Eyes:  negative for - visual disturbance and photophobia  HENMT: negative for - headaches and sinus pain  Respiratory: negative for - cough, hemoptysis, and shortness of breath  Cardiovascular: negative for - chest pain and palpitations  Gastrointestinal: negative for - blood in stools, constipation, diarrhea, nausea, and vomiting  Genito-Urinary: negative for - hematuria, urinary frequency, urinary urgency, and urinary retention  Musculoskeletal: negative for - joint pain, joint stiffness, and joint swelling  Hematological and Lymphatic: negative for - bleeding problems, abnormal bruising, and swollen lymph nodes  Endocrine:  negative for - polydipsia and polyphagia  Allergy/Immunology:  negative for - rhinorrhea, sinus congestion, hives  Integument:  negative for - negative for - rash, change in moles, new or changing lesions  Psychological: negative for - anxiety and depression  Neurological: negative for - memory loss, numbness/tingling, and weakness     PHYSICAL EXAMINATION:  Vitals:  BP (!) 146/94   Pulse 100   Ht 5' 9\" (1.753 m)   Wt (!) 305 lb (138.3 kg)   SpO2 98%   BMI 45.04 kg/m²   General appearance:  Alert, well developed, well nourished, in no distress  HEENT:  normocephalic, atraumatic, sclera appear normal, no nasal abnormalities, no rhinorrhea, Ears appear normal, oral mucous membranes are moist without erythema, trachea midline, thyroid is normal, no lymphadenopathy or neck mass. II (soft palate, uvula, fauces visible). Cardiovascular:  Regular rate and rhythm without murmer. No peripheral edema, No cyanosis or clubbing. No carotid bruits. Pulmonary:  Lungs are clear to auscultation. Breathing appears normal, good expansion, normal effort without use of accessory muscles  Musculoskeletal:  Joints are normal.    Integument:  No rash, erythema, or pallor  Psychiatric:  Mood, affect, and behavior appear normal      NEUROLOGIC EXAMINATION:  Mental Status:  alert, oriented to person, place, and time.   Speech:  Clear without dysarthria or dysphonia  Language:  Fluent without aphasia  Cranial Nerves:   II Visual fields are full to confrontation   III,IV, VI Extraocular movements are full   VII Facial movements are symmetrical without weakness   VIII Hearing is intact   XII No tongue atrophy or fasciculations. Normal tongue protrusion. No tongue weakness  Motor:  Normal strength in both upper and lower extremities. Normal muscle tone and bulk. Deep tendon reflexes are intact and symmetrical in both upper and lower extremities. Rodríguez's signs are absent bilaterally. There is no ankle clonus on either side. Sensation:  Sensation is intact to light touch and vibration in all extremities. Coordination:  Rapid alternating movements are normal in both upper and lower extremities. Finger to nose testing is unimpaired bilaterally. Gait:  Normal station and gait. Assessment:       ICD-10-CM    1. Narcolepsy without cataplexy  G47.419       2. Obstructive sleep apnea  G47.33         I discussed the diagnosis of obstructive sleep apnea with Cris Victor including the pathophysiology (namely the mechanism of breathing and obstruction of upper airway, interruptions of sleep, hypoxemia, hypercapnia, and results of repetitive sympathetic activation), risks, evaluation, and treatment options. I discussed the risks of driving when drowsy and advised that Cris Victor not drive when drowsy and avoid sedating medications and respiratory suppressants. Plan:   1. Continue Adderall  2. I advised that he use his CPAP, we can get him a new machine if necessary. If he loses weight, he could be an option for the [facility]OS[facility]UA device.   3. Follow up in 6 months    Electronically signed by Jeremías Barriga MD on 2/20/23

## 2023-02-21 DIAGNOSIS — G47.419 NARCOLEPSY WITHOUT CATAPLEXY: ICD-10-CM

## 2023-02-21 RX ORDER — DEXTROAMPHETAMINE SACCHARATE, AMPHETAMINE ASPARTATE MONOHYDRATE, DEXTROAMPHETAMINE SULFATE AND AMPHETAMINE SULFATE 5; 5; 5; 5 MG/1; MG/1; MG/1; MG/1
20 CAPSULE, EXTENDED RELEASE ORAL 3 TIMES DAILY
Qty: 90 CAPSULE | Refills: 0 | Status: SHIPPED | OUTPATIENT
Start: 2023-02-21 | End: 2023-03-23

## 2023-02-21 NOTE — TELEPHONE ENCOUNTER
Patient called stating that pharmacy doesn't have 30mg adderall in stock but has the 20mgs. Patient takes 30mg BID, so patient is asking to have 3 20mg send instead to the pharmacy     Requested Prescriptions     Pending Prescriptions Disp Refills    amphetamine-dextroamphetamine (ADDERALL XR) 20 MG extended release capsule 90 capsule 0     Sig: Take 1 capsule by mouth 3 times daily for 30 days.  Max Daily Amount: 60 mg       Last Office Visit:  2/20/2023  Next Office Visit:  Visit date not found  Last Medication Refill:  1/13/2023 with 0 RF   Jean Paul Risk up to date:  up to date    *RX updated to reflect   2/21/2023  fill date*

## 2023-02-27 RX ORDER — PITOLISANT HYDROCHLORIDE 17.8 MG/1
TABLET, FILM COATED ORAL
Qty: 60 TABLET | Refills: 11 | Status: SHIPPED | OUTPATIENT
Start: 2023-02-27

## 2023-03-27 DIAGNOSIS — G47.419 NARCOLEPSY WITHOUT CATAPLEXY: ICD-10-CM

## 2023-03-27 RX ORDER — DEXTROAMPHETAMINE SACCHARATE, AMPHETAMINE ASPARTATE MONOHYDRATE, DEXTROAMPHETAMINE SULFATE AND AMPHETAMINE SULFATE 7.5; 7.5; 7.5; 7.5 MG/1; MG/1; MG/1; MG/1
30 CAPSULE, EXTENDED RELEASE ORAL 2 TIMES DAILY
Qty: 60 CAPSULE | Refills: 0 | Status: SHIPPED | OUTPATIENT
Start: 2023-03-27 | End: 2023-04-26

## 2023-03-27 RX ORDER — DEXTROAMPHETAMINE SACCHARATE, AMPHETAMINE ASPARTATE MONOHYDRATE, DEXTROAMPHETAMINE SULFATE AND AMPHETAMINE SULFATE 5; 5; 5; 5 MG/1; MG/1; MG/1; MG/1
20 CAPSULE, EXTENDED RELEASE ORAL 3 TIMES DAILY
Qty: 90 CAPSULE | Refills: 0 | Status: SHIPPED | OUTPATIENT
Start: 2023-03-27 | End: 2023-04-26

## 2023-03-27 NOTE — TELEPHONE ENCOUNTER
Requested Prescriptions     Pending Prescriptions Disp Refills    amphetamine-dextroamphetamine (ADDERALL XR) 30 MG extended release capsule 60 capsule 0     Sig: Take 1 capsule by mouth in the morning and 1 capsule in the evening. Do all this for 30 days. amphetamine-dextroamphetamine (ADDERALL XR) 20 MG extended release capsule 90 capsule 0     Sig: Take 1 capsule by mouth 3 times daily for 30 days.  Max Daily Amount: 60 mg       Last Office Visit:  2/20/2023  Next Office Visit:  8/22/2023  Last Medication Refill:    20 mg 2/21/23  30 mg 2/20/23    Gaither Moritz up to date:  12/2022    *RX updated to reflect   BOTH 3/27/23  fill date*

## 2023-04-26 DIAGNOSIS — L73.2 HYDRADENITIS: ICD-10-CM

## 2023-04-26 DIAGNOSIS — G47.419 NARCOLEPSY WITHOUT CATAPLEXY: ICD-10-CM

## 2023-04-26 RX ORDER — DEXTROAMPHETAMINE SACCHARATE, AMPHETAMINE ASPARTATE MONOHYDRATE, DEXTROAMPHETAMINE SULFATE AND AMPHETAMINE SULFATE 7.5; 7.5; 7.5; 7.5 MG/1; MG/1; MG/1; MG/1
30 CAPSULE, EXTENDED RELEASE ORAL 2 TIMES DAILY
Qty: 60 CAPSULE | Refills: 0 | Status: SHIPPED | OUTPATIENT
Start: 2023-04-26 | End: 2023-05-26

## 2023-04-26 RX ORDER — DEXTROAMPHETAMINE SACCHARATE, AMPHETAMINE ASPARTATE MONOHYDRATE, DEXTROAMPHETAMINE SULFATE AND AMPHETAMINE SULFATE 5; 5; 5; 5 MG/1; MG/1; MG/1; MG/1
20 CAPSULE, EXTENDED RELEASE ORAL 3 TIMES DAILY
Qty: 90 CAPSULE | Refills: 0 | OUTPATIENT
Start: 2023-04-26 | End: 2023-05-26

## 2023-04-27 RX ORDER — DOXYCYCLINE 100 MG/1
CAPSULE ORAL
Qty: 30 CAPSULE | Refills: 4 | Status: SHIPPED | OUTPATIENT
Start: 2023-04-27

## 2023-04-27 NOTE — TELEPHONE ENCOUNTER
Chelsey Holder called to request a refill on his medication.       Last office visit : Visit date not found   Next office visit : 5/12/2023     Requested Prescriptions     Pending Prescriptions Disp Refills    doxycycline monohydrate (MONODOX) 100 MG capsule [Pharmacy Med Name: DOXYCYCLINE MONO 100 MG CAP] 30 capsule 4     Sig: TAKE 1 CAPSULE BY MOUTH EVERY DAY            Marlon Peña LPN

## 2023-05-12 ENCOUNTER — OFFICE VISIT (OUTPATIENT)
Dept: PRIMARY CARE CLINIC | Age: 43
End: 2023-05-12
Payer: COMMERCIAL

## 2023-05-12 VITALS
HEART RATE: 87 BPM | SYSTOLIC BLOOD PRESSURE: 130 MMHG | BODY MASS INDEX: 45.88 KG/M2 | HEIGHT: 69 IN | OXYGEN SATURATION: 96 % | TEMPERATURE: 97 F | DIASTOLIC BLOOD PRESSURE: 80 MMHG | WEIGHT: 309.8 LBS

## 2023-05-12 DIAGNOSIS — Z13.220 SCREENING FOR HYPERLIPIDEMIA: ICD-10-CM

## 2023-05-12 DIAGNOSIS — Z00.00 WELL ADULT EXAM: Primary | ICD-10-CM

## 2023-05-12 DIAGNOSIS — I10 ESSENTIAL (PRIMARY) HYPERTENSION: ICD-10-CM

## 2023-05-12 DIAGNOSIS — Z12.5 SCREENING FOR PROSTATE CANCER: ICD-10-CM

## 2023-05-12 DIAGNOSIS — G47.33 OBSTRUCTIVE SLEEP APNEA: ICD-10-CM

## 2023-05-12 DIAGNOSIS — R73.09 ELEVATED GLUCOSE: ICD-10-CM

## 2023-05-12 PROCEDURE — 3075F SYST BP GE 130 - 139MM HG: CPT | Performed by: NURSE PRACTITIONER

## 2023-05-12 PROCEDURE — 3079F DIAST BP 80-89 MM HG: CPT | Performed by: NURSE PRACTITIONER

## 2023-05-12 PROCEDURE — 99396 PREV VISIT EST AGE 40-64: CPT | Performed by: NURSE PRACTITIONER

## 2023-05-12 RX ORDER — IRBESARTAN 300 MG/1
300 TABLET ORAL DAILY
Qty: 90 TABLET | Refills: 1 | Status: SHIPPED | OUTPATIENT
Start: 2023-05-12 | End: 2023-08-10

## 2023-05-12 SDOH — ECONOMIC STABILITY: INCOME INSECURITY: HOW HARD IS IT FOR YOU TO PAY FOR THE VERY BASICS LIKE FOOD, HOUSING, MEDICAL CARE, AND HEATING?: NOT HARD AT ALL

## 2023-05-12 SDOH — ECONOMIC STABILITY: FOOD INSECURITY: WITHIN THE PAST 12 MONTHS, YOU WORRIED THAT YOUR FOOD WOULD RUN OUT BEFORE YOU GOT MONEY TO BUY MORE.: NEVER TRUE

## 2023-05-12 SDOH — ECONOMIC STABILITY: HOUSING INSECURITY
IN THE LAST 12 MONTHS, WAS THERE A TIME WHEN YOU DID NOT HAVE A STEADY PLACE TO SLEEP OR SLEPT IN A SHELTER (INCLUDING NOW)?: NO

## 2023-05-12 SDOH — ECONOMIC STABILITY: FOOD INSECURITY: WITHIN THE PAST 12 MONTHS, THE FOOD YOU BOUGHT JUST DIDN'T LAST AND YOU DIDN'T HAVE MONEY TO GET MORE.: NEVER TRUE

## 2023-05-12 ASSESSMENT — ENCOUNTER SYMPTOMS
SORE THROAT: 0
ABDOMINAL PAIN: 0
CHEST TIGHTNESS: 0
DIARRHEA: 0
SHORTNESS OF BREATH: 0
COLOR CHANGE: 0
VOMITING: 0
NAUSEA: 0
COUGH: 0

## 2023-05-12 ASSESSMENT — PATIENT HEALTH QUESTIONNAIRE - PHQ9
SUM OF ALL RESPONSES TO PHQ QUESTIONS 1-9: 0
SUM OF ALL RESPONSES TO PHQ9 QUESTIONS 1 & 2: 0
1. LITTLE INTEREST OR PLEASURE IN DOING THINGS: 0
SUM OF ALL RESPONSES TO PHQ QUESTIONS 1-9: 0
2. FEELING DOWN, DEPRESSED OR HOPELESS: 0

## 2023-05-12 NOTE — ASSESSMENT & PLAN NOTE
Patient here today for a 6 month follow up. He reports to be doing well, and denies any concerns or complaints. Patient report that he is continuing to follow with Dr. Avel Norman regarding sleep apnea and narcolepsy. Patient states he is doing well on the prescribed Adderall, and they are working with him on getting a new Cpap machine due to recall. Lab work ordered, and patient will have them drawn in the next 1-2 weeks.

## 2023-05-12 NOTE — ASSESSMENT & PLAN NOTE
Blood pressure reported to be well controlled irbesartan. Blood pressure today 130/80. Recommend he continue current treatment.

## 2023-05-12 NOTE — PROGRESS NOTES
every 7 days Yes Shashank Payan MD   naproxen (NAPROSYN) 500 MG tablet Take 1 tablet by mouth 2 times daily as needed for Pain Yes Shashank Payan MD   gabapentin (NEURONTIN) 300 MG capsule Take 1 capsule by mouth 3 times daily for 30 days. Patient taking differently: Take 300 mg by mouth as needed. Shashank Payan MD       Review of Systems   Constitutional:  Negative for activity change and fever. HENT:  Negative for congestion, ear pain and sore throat. Respiratory:  Negative for cough, chest tightness and shortness of breath. Cardiovascular:  Negative for chest pain. Gastrointestinal:  Negative for abdominal pain, diarrhea, nausea and vomiting. Genitourinary:  Negative for frequency and urgency. Musculoskeletal:  Negative for arthralgias and myalgias. Skin:  Negative for color change. Neurological:  Negative for dizziness, weakness and numbness. Psychiatric/Behavioral:  Negative for agitation. The patient is not nervous/anxious. /80 (Site: Left Upper Arm, Position: Sitting, Cuff Size: Large Adult)   Pulse 87   Temp 97 °F (36.1 °C) (Temporal)   Ht 5' 9\" (1.753 m)   Wt (!) 309 lb 12.8 oz (140.5 kg)   SpO2 96%   BMI 45.75 kg/m²    Physical Exam  Constitutional:       Appearance: Normal appearance. HENT:      Head: Normocephalic. Right Ear: Tympanic membrane normal.      Left Ear: Tympanic membrane normal.      Nose: Nose normal.      Mouth/Throat:      Mouth: Mucous membranes are moist.      Pharynx: Oropharynx is clear. Eyes:      Extraocular Movements: Extraocular movements intact. Pupils: Pupils are equal, round, and reactive to light. Cardiovascular:      Rate and Rhythm: Normal rate and regular rhythm. Pulses: Normal pulses. Heart sounds: Normal heart sounds. Pulmonary:      Effort: Pulmonary effort is normal.      Breath sounds: Normal breath sounds. Abdominal:      General: Bowel sounds are normal.      Palpations: Abdomen is soft.

## 2023-05-26 DIAGNOSIS — G47.419 NARCOLEPSY WITHOUT CATAPLEXY: ICD-10-CM

## 2023-05-26 NOTE — TELEPHONE ENCOUNTER
Requested Prescriptions     Pending Prescriptions Disp Refills    amphetamine-dextroamphetamine (ADDERALL XR) 30 MG extended release capsule 60 capsule 0     Sig: Take 1 capsule by mouth in the morning and 1 capsule in the evening. Do all this for 30 days.        Last Office Visit:  2/20/2023  Next Office Visit:  8/22/2023  Last Medication Refill:  4/26/2023 with 0 RAFAELA Brown up to date:  5/26/2023    *RX updated to reflect   5/26/2023  fill date*

## 2023-05-27 RX ORDER — DEXTROAMPHETAMINE SACCHARATE, AMPHETAMINE ASPARTATE MONOHYDRATE, DEXTROAMPHETAMINE SULFATE AND AMPHETAMINE SULFATE 7.5; 7.5; 7.5; 7.5 MG/1; MG/1; MG/1; MG/1
30 CAPSULE, EXTENDED RELEASE ORAL 2 TIMES DAILY
Qty: 60 CAPSULE | Refills: 0 | Status: SHIPPED | OUTPATIENT
Start: 2023-05-27 | End: 2023-06-26

## 2023-06-11 PROBLEM — Z00.00 WELL ADULT EXAM: Status: RESOLVED | Noted: 2023-05-12 | Resolved: 2023-06-11

## 2023-06-29 DIAGNOSIS — G47.419 NARCOLEPSY WITHOUT CATAPLEXY: ICD-10-CM

## 2023-06-29 RX ORDER — DEXTROAMPHETAMINE SACCHARATE, AMPHETAMINE ASPARTATE MONOHYDRATE, DEXTROAMPHETAMINE SULFATE AND AMPHETAMINE SULFATE 7.5; 7.5; 7.5; 7.5 MG/1; MG/1; MG/1; MG/1
30 CAPSULE, EXTENDED RELEASE ORAL 2 TIMES DAILY
Qty: 60 CAPSULE | Refills: 0 | Status: SHIPPED | OUTPATIENT
Start: 2023-06-29 | End: 2023-07-29

## 2023-07-25 DIAGNOSIS — G47.419 NARCOLEPSY WITHOUT CATAPLEXY: ICD-10-CM

## 2023-07-25 NOTE — TELEPHONE ENCOUNTER
Requested Prescriptions     Pending Prescriptions Disp Refills    amphetamine-dextroamphetamine (ADDERALL XR) 30 MG extended release capsule 60 capsule 0     Sig: Take 1 capsule by mouth in the morning and 1 capsule in the evening. Do all this for 30 days.        Last Office Visit:  2/20/2023  Next Office Visit:  8/22/2023  Last Medication Refill:  6/29/2023   Elia Dobson up to date:  7/25/2023 manual process     *RX updated to reflect   7/29/2023  fill date*

## 2023-07-26 RX ORDER — DEXTROAMPHETAMINE SACCHARATE, AMPHETAMINE ASPARTATE MONOHYDRATE, DEXTROAMPHETAMINE SULFATE AND AMPHETAMINE SULFATE 7.5; 7.5; 7.5; 7.5 MG/1; MG/1; MG/1; MG/1
30 CAPSULE, EXTENDED RELEASE ORAL
Qty: 60 CAPSULE | Refills: 0 | Status: SHIPPED | OUTPATIENT
Start: 2023-07-29 | End: 2023-08-28

## 2023-08-22 ENCOUNTER — OFFICE VISIT (OUTPATIENT)
Dept: NEUROLOGY | Age: 43
End: 2023-08-22
Payer: COMMERCIAL

## 2023-08-22 VITALS
DIASTOLIC BLOOD PRESSURE: 80 MMHG | HEART RATE: 102 BPM | WEIGHT: 300 LBS | RESPIRATION RATE: 20 BRPM | BODY MASS INDEX: 44.43 KG/M2 | SYSTOLIC BLOOD PRESSURE: 119 MMHG | HEIGHT: 69 IN

## 2023-08-22 DIAGNOSIS — Z79.899 ENCOUNTER FOR LONG-TERM (CURRENT) USE OF MEDICATIONS: ICD-10-CM

## 2023-08-22 DIAGNOSIS — G47.33 OBSTRUCTIVE SLEEP APNEA: ICD-10-CM

## 2023-08-22 DIAGNOSIS — Z79.899 ENCOUNTER FOR LONG-TERM (CURRENT) USE OF MEDICATIONS: Primary | ICD-10-CM

## 2023-08-22 DIAGNOSIS — G47.419 NARCOLEPSY WITHOUT CATAPLEXY: ICD-10-CM

## 2023-08-22 LAB
AMPHET UR QL SCN: POSITIVE
BARBITURATES UR QL SCN: NEGATIVE
BENZODIAZ UR QL SCN: NEGATIVE
BUPRENORPHINE URINE: NEGATIVE
CANNABINOIDS UR QL SCN: NEGATIVE
COCAINE UR QL SCN: NEGATIVE
DRUG SCREEN COMMENT UR-IMP: ABNORMAL
METHADONE UR QL SCN: NEGATIVE
METHAMPHETAMINE, URINE: NEGATIVE
OPIATES UR QL SCN: NEGATIVE
OXYCODONE UR QL SCN: NEGATIVE
PCP UR QL SCN: NEGATIVE
PROPOXYPH UR QL SCN: NEGATIVE
TRICYCLIC, URINE: NEGATIVE

## 2023-08-22 PROCEDURE — 3078F DIAST BP <80 MM HG: CPT | Performed by: PSYCHIATRY & NEUROLOGY

## 2023-08-22 PROCEDURE — 3074F SYST BP LT 130 MM HG: CPT | Performed by: PSYCHIATRY & NEUROLOGY

## 2023-08-22 PROCEDURE — 99213 OFFICE O/P EST LOW 20 MIN: CPT | Performed by: PSYCHIATRY & NEUROLOGY

## 2023-08-22 RX ORDER — DEXTROAMPHETAMINE SACCHARATE, AMPHETAMINE ASPARTATE MONOHYDRATE, DEXTROAMPHETAMINE SULFATE AND AMPHETAMINE SULFATE 7.5; 7.5; 7.5; 7.5 MG/1; MG/1; MG/1; MG/1
30 CAPSULE, EXTENDED RELEASE ORAL
Qty: 60 CAPSULE | Refills: 0 | Status: CANCELLED | OUTPATIENT
Start: 2023-08-22 | End: 2023-09-21

## 2023-08-25 DIAGNOSIS — G47.419 NARCOLEPSY WITHOUT CATAPLEXY: ICD-10-CM

## 2023-08-25 DIAGNOSIS — L73.2 HYDRADENITIS: ICD-10-CM

## 2023-08-25 DIAGNOSIS — I10 ESSENTIAL (PRIMARY) HYPERTENSION: ICD-10-CM

## 2023-08-25 RX ORDER — DEXTROAMPHETAMINE SACCHARATE, AMPHETAMINE ASPARTATE MONOHYDRATE, DEXTROAMPHETAMINE SULFATE AND AMPHETAMINE SULFATE 7.5; 7.5; 7.5; 7.5 MG/1; MG/1; MG/1; MG/1
30 CAPSULE, EXTENDED RELEASE ORAL
Qty: 60 CAPSULE | Refills: 0 | Status: SHIPPED | OUTPATIENT
Start: 2023-08-28 | End: 2023-09-27

## 2023-08-25 NOTE — TELEPHONE ENCOUNTER
Requested Prescriptions     Pending Prescriptions Disp Refills    amphetamine-dextroamphetamine (ADDERALL XR) 30 MG extended release capsule 60 capsule 0     Sig: Take 1 capsule by mouth in the morning and 1 capsule in the evening. Do all this for 30 days.        Last Office Visit:  8/22/2023  Next Office Visit:  2/20/2024  Last Medication Refill:  7/29/23  Waylon Rose up to date:  8/25/23    *RX updated to reflect   8/28/23  fill date*      Waylon Rose is pending manual processing

## 2023-08-28 RX ORDER — DOXYCYCLINE 100 MG/1
100 CAPSULE ORAL DAILY
Qty: 90 CAPSULE | Refills: 3 | Status: SHIPPED | OUTPATIENT
Start: 2023-08-28

## 2023-08-30 NOTE — TELEPHONE ENCOUNTER
Ca Sánchez called to request a refill on his medication.       Last office visit : 5/12/2023   Next office visit : 11/17/2023     Requested Prescriptions     Pending Prescriptions Disp Refills    irbesartan (AVAPRO) 300 MG tablet 90 tablet 1     Sig: Take 1 tablet by mouth daily            Morteza Medina MA

## 2023-09-01 RX ORDER — IRBESARTAN 300 MG/1
300 TABLET ORAL DAILY
Qty: 90 TABLET | Refills: 1 | Status: SHIPPED | OUTPATIENT
Start: 2023-09-01 | End: 2023-11-30

## 2023-09-26 DIAGNOSIS — G47.419 NARCOLEPSY WITHOUT CATAPLEXY: ICD-10-CM

## 2023-09-26 NOTE — TELEPHONE ENCOUNTER
Requested Prescriptions     Pending Prescriptions Disp Refills    amphetamine-dextroamphetamine (ADDERALL XR) 30 MG extended release capsule 60 capsule 0     Sig: Take 1 capsule by mouth in the morning and 1 capsule in the evening. Do all this for 30 days.        Last Office Visit:  8/22/2023  Next Office Visit:  2/20/2024  Last Medication Refill:  8/28/2023 with 0 RF   Taisha Jacobson up to date:  8/28/2023    *RX updated to reflect   9/27/2023   fill date*

## 2023-09-27 RX ORDER — DEXTROAMPHETAMINE SACCHARATE, AMPHETAMINE ASPARTATE MONOHYDRATE, DEXTROAMPHETAMINE SULFATE AND AMPHETAMINE SULFATE 7.5; 7.5; 7.5; 7.5 MG/1; MG/1; MG/1; MG/1
30 CAPSULE, EXTENDED RELEASE ORAL
Qty: 60 CAPSULE | Refills: 0 | Status: SHIPPED | OUTPATIENT
Start: 2023-09-27 | End: 2023-10-27

## 2023-10-24 DIAGNOSIS — G47.419 NARCOLEPSY WITHOUT CATAPLEXY: ICD-10-CM

## 2023-10-24 RX ORDER — DEXTROAMPHETAMINE SACCHARATE, AMPHETAMINE ASPARTATE MONOHYDRATE, DEXTROAMPHETAMINE SULFATE AND AMPHETAMINE SULFATE 7.5; 7.5; 7.5; 7.5 MG/1; MG/1; MG/1; MG/1
30 CAPSULE, EXTENDED RELEASE ORAL
Qty: 60 CAPSULE | Refills: 0 | Status: SHIPPED | OUTPATIENT
Start: 2023-10-27 | End: 2023-11-26

## 2023-10-24 NOTE — TELEPHONE ENCOUNTER
Requested Prescriptions     Pending Prescriptions Disp Refills    amphetamine-dextroamphetamine (ADDERALL XR) 30 MG extended release capsule 60 capsule 0     Sig: Take 1 capsule by mouth in the morning and 1 capsule in the evening. Do all this for 30 days.        Last Office Visit:  8/22/2023  Next Office Visit:  2/20/2024  Last Medication Refill:  9/27/23  Maryjane Crespo up to date:  8/28/23    *RX updated to reflect   10/27/23  fill date*

## 2023-11-17 ENCOUNTER — OFFICE VISIT (OUTPATIENT)
Dept: PRIMARY CARE CLINIC | Age: 43
End: 2023-11-17
Payer: COMMERCIAL

## 2023-11-17 VITALS
DIASTOLIC BLOOD PRESSURE: 70 MMHG | BODY MASS INDEX: 46.81 KG/M2 | SYSTOLIC BLOOD PRESSURE: 130 MMHG | TEMPERATURE: 97.2 F | HEART RATE: 105 BPM | OXYGEN SATURATION: 95 % | WEIGHT: 315 LBS

## 2023-11-17 DIAGNOSIS — I10 ESSENTIAL (PRIMARY) HYPERTENSION: Primary | ICD-10-CM

## 2023-11-17 DIAGNOSIS — Z12.5 ENCOUNTER FOR PROSTATE CANCER SCREENING: ICD-10-CM

## 2023-11-17 DIAGNOSIS — M25.551 RIGHT HIP PAIN: ICD-10-CM

## 2023-11-17 DIAGNOSIS — L73.2 HIDRADENITIS: ICD-10-CM

## 2023-11-17 PROCEDURE — 99214 OFFICE O/P EST MOD 30 MIN: CPT | Performed by: FAMILY MEDICINE

## 2023-11-17 PROCEDURE — 3078F DIAST BP <80 MM HG: CPT | Performed by: FAMILY MEDICINE

## 2023-11-17 PROCEDURE — 3075F SYST BP GE 130 - 139MM HG: CPT | Performed by: FAMILY MEDICINE

## 2023-11-17 RX ORDER — NAPROXEN 500 MG/1
500 TABLET ORAL 2 TIMES DAILY PRN
Qty: 30 TABLET | Refills: 5 | Status: SHIPPED | OUTPATIENT
Start: 2023-11-17

## 2023-11-17 NOTE — PATIENT INSTRUCTIONS
for [facility]. How long does the prior authorization process take? Can often take 2 weeks or longer to submit all of the documentation, complete the paperwork, and await reply from the insurance. Once the medication is approved, schedule an appointment for a 4-week follow-up to review the medication, dose adjustments, and address any potential side effects  If your medication received prior authorization from our office but still gets rejected at the pharmacy, the pharmacy technician may need to contact the helpdesk at your insurance to clarify the rejection. Some insurance plans automatically reject medication claims greater than $1000. What if the medication is denied after we submit a prior authorization? Contact your insurance again to explain why it was denied. Ask the following questions:  What information is missing from the original authorization request?  How can the provider submit that missing information to you? Can I start the appeal process myself? Can the appeal be submitted online? How long does the authorization process take? How will I know if it has been approved? Do you contact me or the pharmacy? Send us any additional information you obtain from the phone call. If [facility] is not covered by your insurance, and you have run out of other options, consider these actions: You can ask your insurance which other weight loss maintenance products are covered, if any. Discuss these different options with your doctor and if any may be a reasonable alternative to [facility]. Contact [employer] at [phone] (Monday-Friday, 8:00 am-8:00 pm ET) to learn about any financial assistance programs you may qualify for. Labs before next appointment:  Go to room 405 for labs prior to next visit. Be sure to fast for at least 10 hours prior to laboratory appointment. No appointment is necessary for laboratory work as the orders have already been placed.     Lab opens at 6:30 am and

## 2023-11-20 DIAGNOSIS — G47.419 NARCOLEPSY WITHOUT CATAPLEXY: ICD-10-CM

## 2023-11-21 RX ORDER — DEXTROAMPHETAMINE SACCHARATE, AMPHETAMINE ASPARTATE MONOHYDRATE, DEXTROAMPHETAMINE SULFATE AND AMPHETAMINE SULFATE 7.5; 7.5; 7.5; 7.5 MG/1; MG/1; MG/1; MG/1
30 CAPSULE, EXTENDED RELEASE ORAL
Qty: 60 CAPSULE | Refills: 0 | Status: SHIPPED | OUTPATIENT
Start: 2023-11-26 | End: 2023-12-26

## 2023-11-21 NOTE — TELEPHONE ENCOUNTER
Requested Prescriptions     Pending Prescriptions Disp Refills    amphetamine-dextroamphetamine (ADDERALL XR) 30 MG extended release capsule 60 capsule 0     Sig: Take 1 capsule by mouth in the morning and 1 capsule in the evening. Do all this for 30 days.        Last Office Visit:  8/22/2023  Next Office Visit:  2/20/2024  Last Medication Refill:  10/27/2023 with 0 RAFAELA Alanis up to date:  8/28/2023    *RX updated to reflect   11/26/2023  fill date*

## 2023-12-23 DIAGNOSIS — G47.419 NARCOLEPSY WITHOUT CATAPLEXY: ICD-10-CM

## 2023-12-27 RX ORDER — DEXTROAMPHETAMINE SACCHARATE, AMPHETAMINE ASPARTATE MONOHYDRATE, DEXTROAMPHETAMINE SULFATE AND AMPHETAMINE SULFATE 7.5; 7.5; 7.5; 7.5 MG/1; MG/1; MG/1; MG/1
30 CAPSULE, EXTENDED RELEASE ORAL
Qty: 60 CAPSULE | Refills: 0 | Status: SHIPPED | OUTPATIENT
Start: 2023-12-27 | End: 2024-01-26

## 2023-12-27 NOTE — TELEPHONE ENCOUNTER
Requested Prescriptions     Pending Prescriptions Disp Refills    amphetamine-dextroamphetamine (ADDERALL XR) 30 MG extended release capsule 60 capsule 0     Sig: Take 1 capsule by mouth in the morning and 1 capsule in the evening. Do all this for 30 days.        Last Office Visit:  8/22/2023  Next Office Visit:  2/20/2024  Last Medication Refill:  11/26/2023 with 0 RAFAELA Willingham up to date:  12/27/2023 Pending manual process     *RX updated to reflect   12/27/2023  fill date*

## 2024-01-25 DIAGNOSIS — G47.419 NARCOLEPSY WITHOUT CATAPLEXY: ICD-10-CM

## 2024-01-25 NOTE — TELEPHONE ENCOUNTER
Requested Prescriptions     Pending Prescriptions Disp Refills    amphetamine-dextroamphetamine (ADDERALL XR) 30 MG extended release capsule 60 capsule 0     Sig: Take 1 capsule by mouth in the morning and 1 capsule in the evening. Do all this for 30 days.       Last Office Visit:  8/22/2023  Next Office Visit:  2/20/2024  Last Medication Refill:  12/27/23  Fer up to date:  12/27/23    *RX updated to reflect   1/26/24  fill date*

## 2024-01-26 RX ORDER — DEXTROAMPHETAMINE SACCHARATE, AMPHETAMINE ASPARTATE MONOHYDRATE, DEXTROAMPHETAMINE SULFATE AND AMPHETAMINE SULFATE 7.5; 7.5; 7.5; 7.5 MG/1; MG/1; MG/1; MG/1
30 CAPSULE, EXTENDED RELEASE ORAL
Qty: 60 CAPSULE | Refills: 0 | Status: SHIPPED | OUTPATIENT
Start: 2024-01-26 | End: 2024-02-25

## 2024-02-06 ENCOUNTER — TELEPHONE (OUTPATIENT)
Dept: NEUROLOGY | Age: 44
End: 2024-02-06

## 2024-02-06 NOTE — TELEPHONE ENCOUNTER
ADE GARCIA (Key: [id number])  PA Case ID #: [id number]  Need Help? Call us at [phone]  Status  Sent to Plan today  Drug  Adderall XR 30MG er capsules    Form  [facility] Electronic PA Form (2017 [employer])

## 2024-02-06 NOTE — TELEPHONE ENCOUNTER
----- Message from Kiana Alonso MA sent at 2/1/2024  9:22 AM CST -----  Patient called stating that his adderall XR needs PA

## 2024-02-08 ENCOUNTER — PATIENT MESSAGE (OUTPATIENT)
Dept: NEUROLOGY | Age: 44
End: 2024-02-08

## 2024-02-08 NOTE — TELEPHONE ENCOUNTER
Medication Name  IMPORTANT! The medication selected determines the Criteria returned. Please make sure you are submitting the correct Brand or Generic medication, to enhance your ePA experience.  Quantity  Please ensure quantity entered matches the prescription directions.  Please enter no more than a 30 day supply.  For example, 90 tablets/90 days should be entered as a quantity of 30 tablets.  Enter numeric value with up to 2 decimal places and a preceding zero before the decimal when applicable.  For example, .5 must be entered as 0.5.   Confirm dosage form  Days Supply  Not to exceed a 30-day supply  Primary Diagnosis  ICD-10Begin typing diagnosis code or description and select from list

## 2024-02-08 NOTE — TELEPHONE ENCOUNTER
ADE GARCIA (Key: [id number])  PA Case ID #: [id number]  Need Help? Call us at [phone]  Outcome  Approved on February 7  Your PA request has been approved. Additional information will be provided in the approval communication. (Message 1145)  Authorization Expiration Date: 2/6/2027  Drug  Adderall XR 30MG er capsules    Form  [employer] Electronic PA Form (2017 [employer])

## 2024-02-20 ENCOUNTER — TELEPHONE (OUTPATIENT)
Dept: NEUROLOGY | Age: 44
End: 2024-02-20

## 2024-02-20 NOTE — TELEPHONE ENCOUNTER
Called patients insurance and spoke with Patti and she stated she did see where a PA was done for the patients medication Adderall XR 30 MG ER Capsules and that the PA was done for a quantity of 60 for every 30 days. At this time Patti placed me on hold to see if there was something else that needed to be done since the PA was approved for 60 capsules for the patient.  Patti came back on the line and said the best thing that we should do is for her to cancel the PA request that had already been approved and start a new PA.  So at this time Patti has canceled the original PA and started a new PA and it came back approved again for the quantity of 60 so she was going to do a quantity exception and send it to the clinical team to see if they will approve the exception. Patti stated that if the exception was denied the patient was good to get 30 capsules every 15 days due to that is what the insurance allows.  Patti stated this exception could take up to 10 business days.

## 2024-02-27 NOTE — TELEPHONE ENCOUNTER
Called patient's insurance and spoke with Cleopatra she stated that there was a paid claim for the patients medication Adderall XR 30 MG on 02/16/2024.Cleopatra looked into the case and said the quantity request was still pending at this time that was sent in on 02/20/2024.

## 2024-03-20 DIAGNOSIS — G47.419 NARCOLEPSY WITHOUT CATAPLEXY: ICD-10-CM

## 2024-03-20 RX ORDER — DEXTROAMPHETAMINE SACCHARATE, AMPHETAMINE ASPARTATE MONOHYDRATE, DEXTROAMPHETAMINE SULFATE AND AMPHETAMINE SULFATE 7.5; 7.5; 7.5; 7.5 MG/1; MG/1; MG/1; MG/1
30 CAPSULE, EXTENDED RELEASE ORAL
Qty: 60 CAPSULE | Refills: 0 | Status: SHIPPED | OUTPATIENT
Start: 2024-03-20 | End: 2024-04-19

## 2024-03-21 ENCOUNTER — OFFICE VISIT (OUTPATIENT)
Dept: PRIMARY CARE CLINIC | Age: 44
End: 2024-03-21
Payer: COMMERCIAL

## 2024-03-21 VITALS
OXYGEN SATURATION: 97 % | HEART RATE: 95 BPM | BODY MASS INDEX: 48.26 KG/M2 | WEIGHT: 315 LBS | SYSTOLIC BLOOD PRESSURE: 132 MMHG | DIASTOLIC BLOOD PRESSURE: 80 MMHG | TEMPERATURE: 98.6 F

## 2024-03-21 DIAGNOSIS — J40 BRONCHITIS: Primary | ICD-10-CM

## 2024-03-21 DIAGNOSIS — H69.93 EUSTACHIAN TUBE DYSFUNCTION, BILATERAL: ICD-10-CM

## 2024-03-21 DIAGNOSIS — J01.90 ACUTE SINUSITIS, RECURRENCE NOT SPECIFIED, UNSPECIFIED LOCATION: ICD-10-CM

## 2024-03-21 DIAGNOSIS — R05.1 ACUTE COUGH: ICD-10-CM

## 2024-03-21 PROCEDURE — 3079F DIAST BP 80-89 MM HG: CPT | Performed by: NURSE PRACTITIONER

## 2024-03-21 PROCEDURE — 96372 THER/PROPH/DIAG INJ SC/IM: CPT | Performed by: NURSE PRACTITIONER

## 2024-03-21 PROCEDURE — 99213 OFFICE O/P EST LOW 20 MIN: CPT | Performed by: NURSE PRACTITIONER

## 2024-03-21 PROCEDURE — 87428 SARSCOV & INF VIR A&B AG IA: CPT | Performed by: NURSE PRACTITIONER

## 2024-03-21 PROCEDURE — 3075F SYST BP GE 130 - 139MM HG: CPT | Performed by: NURSE PRACTITIONER

## 2024-03-21 RX ORDER — AZITHROMYCIN 250 MG/1
TABLET, FILM COATED ORAL
Qty: 6 TABLET | Refills: 0 | Status: SHIPPED | OUTPATIENT
Start: 2024-03-21 | End: 2024-03-31

## 2024-03-21 RX ORDER — DEXAMETHASONE SODIUM PHOSPHATE 10 MG/ML
10 INJECTION, SOLUTION INTRAMUSCULAR; INTRAVENOUS ONCE
Status: COMPLETED | OUTPATIENT
Start: 2024-03-21 | End: 2024-03-21

## 2024-03-21 RX ORDER — BENZONATATE 100 MG/1
100 CAPSULE ORAL 3 TIMES DAILY PRN
Qty: 30 CAPSULE | Refills: 0 | Status: SHIPPED | OUTPATIENT
Start: 2024-03-21 | End: 2024-03-28

## 2024-03-21 RX ORDER — PREDNISONE 20 MG/1
20 TABLET ORAL 2 TIMES DAILY
Qty: 10 TABLET | Refills: 0 | Status: SHIPPED | OUTPATIENT
Start: 2024-03-22 | End: 2024-03-27

## 2024-03-21 RX ORDER — DEXTROMETHORPHAN HYDROBROMIDE AND PROMETHAZINE HYDROCHLORIDE 15; 6.25 MG/5ML; MG/5ML
5 SYRUP ORAL
Qty: 118 ML | Refills: 0 | Status: SHIPPED | OUTPATIENT
Start: 2024-03-21 | End: 2024-03-26

## 2024-03-21 RX ADMIN — DEXAMETHASONE SODIUM PHOSPHATE 10 MG: 10 INJECTION, SOLUTION INTRAMUSCULAR; INTRAVENOUS at 13:46

## 2024-03-21 ASSESSMENT — ENCOUNTER SYMPTOMS
SHORTNESS OF BREATH: 1
COUGH: 1
EYES NEGATIVE: 1
SORE THROAT: 1
GASTROINTESTINAL NEGATIVE: 1
ALLERGIC/IMMUNOLOGIC NEGATIVE: 1

## 2024-03-21 NOTE — PROGRESS NOTES
[facility]  [address]  [address]  [place] [address]  Dept: [phone]  Dept Fax: [phone]  Loc: [phone]    Kris Wyatt is a 43 y.o. male who presents today for his medical conditions/complaints as noted below.  Kris Wyatt is c/o of Ear Fullness (Ears clogged and hurting ), Shortness of Breath, Cough, Congestion (Chest feels like its on fire, coughing up green phlegm ), Headache, Nasal Congestion (Blowing green mucus ), and Pharyngitis        HPI:   He is a patient of Dr. Stephon Barnett that presents today for an acute care visit.  He complains of ears clogged and hurting, cough, chest congestion, coughing up green phlegm, shortness of breath, nasal congestion, headache, sore throat that started on yesterday.  His wife is present with him and states that his ear has been bothering him so bad that she has been using some numbing drops that they had at home.  He is on Doxycycline for hiradenitis.  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

## 2024-04-06 DIAGNOSIS — I10 ESSENTIAL (PRIMARY) HYPERTENSION: ICD-10-CM

## 2024-04-08 RX ORDER — IRBESARTAN 300 MG/1
300 TABLET ORAL DAILY
Qty: 90 TABLET | Refills: 1 | Status: SHIPPED | OUTPATIENT
Start: 2024-04-08

## 2024-04-17 DIAGNOSIS — G47.419 NARCOLEPSY WITHOUT CATAPLEXY: ICD-10-CM

## 2024-04-18 NOTE — TELEPHONE ENCOUNTER
Requested Prescriptions     Pending Prescriptions Disp Refills    amphetamine-dextroamphetamine (ADDERALL XR) 30 MG extended release capsule 60 capsule 0     Sig: Take 1 capsule by mouth in the morning and 1 capsule in the evening. Do all this for 30 days.       Last Office Visit:  8/22/2023  Next Office Visit:  7/15/2024  Last Medication Refill:  3-2024  Fer up to date:  4-    *RX updated to reflect   4-  fill date*             
as per hpi

## 2024-04-19 RX ORDER — DEXTROAMPHETAMINE SACCHARATE, AMPHETAMINE ASPARTATE MONOHYDRATE, DEXTROAMPHETAMINE SULFATE AND AMPHETAMINE SULFATE 7.5; 7.5; 7.5; 7.5 MG/1; MG/1; MG/1; MG/1
30 CAPSULE, EXTENDED RELEASE ORAL
Qty: 60 CAPSULE | Refills: 0 | Status: SHIPPED | OUTPATIENT
Start: 2024-04-19 | End: 2024-05-19

## 2024-05-13 DIAGNOSIS — Z12.5 ENCOUNTER FOR PROSTATE CANCER SCREENING: ICD-10-CM

## 2024-05-13 LAB
ALBUMIN SERPL-MCNC: 3.8 G/DL (ref 3.5–5.2)
ALP SERPL-CCNC: 64 U/L (ref 40–130)
ALT SERPL-CCNC: 32 U/L (ref 5–41)
ANION GAP SERPL CALCULATED.3IONS-SCNC: 10 MMOL/L (ref 7–19)
AST SERPL-CCNC: 19 U/L (ref 5–40)
BASOPHILS # BLD: 0.1 K/UL (ref 0–0.2)
BASOPHILS NFR BLD: 0.9 % (ref 0–1)
BILIRUB SERPL-MCNC: 0.2 MG/DL (ref 0.2–1.2)
BUN SERPL-MCNC: 14 MG/DL (ref 6–20)
CALCIUM SERPL-MCNC: 8.3 MG/DL (ref 8.6–10)
CHLORIDE SERPL-SCNC: 108 MMOL/L (ref 98–111)
CHOLEST SERPL-MCNC: 165 MG/DL (ref 160–199)
CO2 SERPL-SCNC: 23 MMOL/L (ref 22–29)
CREAT SERPL-MCNC: 0.8 MG/DL (ref 0.5–1.2)
EOSINOPHIL # BLD: 0.2 K/UL (ref 0–0.6)
EOSINOPHIL NFR BLD: 2.1 % (ref 0–5)
ERYTHROCYTE [DISTWIDTH] IN BLOOD BY AUTOMATED COUNT: 14.1 % (ref 11.5–14.5)
GLUCOSE SERPL-MCNC: 120 MG/DL (ref 74–109)
HCT VFR BLD AUTO: 45.5 % (ref 42–52)
HDLC SERPL-MCNC: 38 MG/DL (ref 55–121)
HGB BLD-MCNC: 15.1 G/DL (ref 14–18)
IMM GRANULOCYTES # BLD: 0.1 K/UL
LDLC SERPL CALC-MCNC: 101 MG/DL
LYMPHOCYTES # BLD: 3.6 K/UL (ref 1.1–4.5)
LYMPHOCYTES NFR BLD: 39.1 % (ref 20–40)
MCH RBC QN AUTO: 30.9 PG (ref 27–31)
MCHC RBC AUTO-ENTMCNC: 33.2 G/DL (ref 33–37)
MCV RBC AUTO: 93 FL (ref 80–94)
MONOCYTES # BLD: 1 K/UL (ref 0–0.9)
MONOCYTES NFR BLD: 10.5 % (ref 0–10)
NEUTROPHILS # BLD: 4.3 K/UL (ref 1.5–7.5)
NEUTS SEG NFR BLD: 46.5 % (ref 50–65)
PLATELET # BLD AUTO: 292 K/UL (ref 130–400)
PMV BLD AUTO: 10.5 FL (ref 9.4–12.4)
POTASSIUM SERPL-SCNC: 4.1 MMOL/L (ref 3.5–5)
PROT SERPL-MCNC: 6.4 G/DL (ref 6.6–8.7)
RBC # BLD AUTO: 4.89 M/UL (ref 4.7–6.1)
SODIUM SERPL-SCNC: 141 MMOL/L (ref 136–145)
TRIGL SERPL-MCNC: 131 MG/DL (ref 0–149)
WBC # BLD AUTO: 9.3 K/UL (ref 4.8–10.8)

## 2024-05-17 ENCOUNTER — OFFICE VISIT (OUTPATIENT)
Dept: PRIMARY CARE CLINIC | Age: 44
End: 2024-05-17
Payer: COMMERCIAL

## 2024-05-17 VITALS
SYSTOLIC BLOOD PRESSURE: 134 MMHG | HEART RATE: 96 BPM | TEMPERATURE: 97.5 F | OXYGEN SATURATION: 97 % | BODY MASS INDEX: 49.47 KG/M2 | DIASTOLIC BLOOD PRESSURE: 86 MMHG | WEIGHT: 315 LBS

## 2024-05-17 DIAGNOSIS — I10 ESSENTIAL (PRIMARY) HYPERTENSION: Primary | ICD-10-CM

## 2024-05-17 DIAGNOSIS — G47.419 NARCOLEPSY WITHOUT CATAPLEXY: ICD-10-CM

## 2024-05-17 DIAGNOSIS — Z13.220 LIPID SCREENING: ICD-10-CM

## 2024-05-17 DIAGNOSIS — R73.9 HYPERGLYCEMIA: ICD-10-CM

## 2024-05-17 DIAGNOSIS — G47.33 OBSTRUCTIVE SLEEP APNEA: ICD-10-CM

## 2024-05-17 PROCEDURE — 3075F SYST BP GE 130 - 139MM HG: CPT | Performed by: FAMILY MEDICINE

## 2024-05-17 PROCEDURE — 3079F DIAST BP 80-89 MM HG: CPT | Performed by: FAMILY MEDICINE

## 2024-05-17 PROCEDURE — 99213 OFFICE O/P EST LOW 20 MIN: CPT | Performed by: FAMILY MEDICINE

## 2024-05-17 ASSESSMENT — PATIENT HEALTH QUESTIONNAIRE - PHQ9
SUM OF ALL RESPONSES TO PHQ QUESTIONS 1-9: 0
2. FEELING DOWN, DEPRESSED OR HOPELESS: NOT AT ALL
SUM OF ALL RESPONSES TO PHQ QUESTIONS 1-9: 0
1. LITTLE INTEREST OR PLEASURE IN DOING THINGS: NOT AT ALL
SUM OF ALL RESPONSES TO PHQ9 QUESTIONS 1 & 2: 0

## 2024-05-17 NOTE — PATIENT INSTRUCTIONS
Examine your lifestyle and the barriers to bad and good habits and how you can design your life to make better choices      Make it EASY to do the RIGHT THINGS.    1)  You can choose to Get 150 min/week of moderate exercise (can talk but can't sing) or 75 min/week of vigorous exercise (can't talk)   Examples: Walking, treadmill, bicycling, attending a gym.    2)  You can choose to Get 7-9 hours of sleep per night    Allow enough time each night to get adequate sleep.  Keep regular evening hours, same to bed and getting up every day.    3)  You can choose to Strength Train 2 x a week on non-consecutive days   Bodyweight exercises such push ups, sit ups, pilates or yoga   Purchase resistance bands to perform exercises   Utilize phone apps such as: [employer] or [employer]   Contact your local gym for a     4)  You can choose good nutrition.  Only eat your goal weight (in lbs) x 10 calories/day and get 5 servings of Vegetables/day   Choose to eat a healthy diet such as the Mediterranean diet.    Plant based diets reduce risk of heart attack/stroke and will help you feel full on less food.    Avoid highly processed foods and processed carbohydrates.   Utilize apps to track your food:  Examples: Loseit, Myfitnesspal, or CalorieCounter by [employer]   Choose to follow a program.   Examples: Weight Watchers, Miguel Ángel Vogt or Nutrisystem   Utilize apps identify and improve eating habits:   Examples: Eat Right Now, Noom    5)  You can choose to drink less alcohol: Drink less than 1-2 drinks/day no more than 7 drinks per week.    Alcohol will disrupt your sleep and add calories to your day    6)  You can choose to Practice Mindfulness and work on stress reduction.    Utilize apps such as Calm, Headspace, Abide  Contact local behavioral health specialists      Small changes every day will add up           Check the following website to choose validated blood pressure

## 2024-05-17 NOTE — TELEPHONE ENCOUNTER
Requested Prescriptions     Pending Prescriptions Disp Refills    amphetamine-dextroamphetamine (ADDERALL XR) 30 MG extended release capsule 60 capsule 0     Sig: Take 1 capsule by mouth in the morning and 1 capsule in the evening. Do all this for 30 days.       Last Office Visit:  8/22/2023  Next Office Visit:  7/15/2024  Last Medication Refill:  4/19/24  Fer up to date:  5/17/24    *RX updated to reflect   5/19/24  fill date*

## 2024-05-17 NOTE — PROGRESS NOTES
[facility]  [address]  [address]  [place] [address]  Dept: [phone]  Dept Fax: [phone]  Loc: [phone]    Kris Wyatt is a 43 y.o. male who presents today for his medical conditions/complaints as noted below.  Kris Wyatt is here for 6 Month Follow-Up and Arthritis    Patient History:     Obstructive sleep apnea with narcolepsy  -[facility] neurology      pthx         Subjective:   CC:  Here today to discuss the following:    Hypertension  Compliant with medications.  No adverse effects from medication.  No lightheadedness, palpitations, or chest pain.    Review of Systems   Constitutional: Negative for chills and fever.   HENT: Negative for congestion.    Respiratory: Negative for cough, chest tightness and shortness of breath.  Cardiovascular: Negative for chest pain, palpitations and leg swelling.   Gastrointestinal: Negative for abdominal pain, anal bleeding, constipation, diarrhea and nausea.         Review of Systems  Objective:   /86   Pulse 96   Temp 97.5 °F (36.4 °C)   Wt (!) 152 kg (335 lb)   SpO2 97%   BMI 49.47 kg/m²   BP Readings from Last 3 Encounters:   05/17/24 134/86   03/21/24 132/80   11/17/23 130/70    Wt Readings from Last 3 Encounters:   05/17/24 (!) 152 kg (335 lb)   03/21/24 (!) 148.2 kg (326 lb 12.8 oz)   11/17/23 (!) 143.8 kg (317 lb)         Physical Exam   Constitutional: He appears well. Does not appear ill.   Neck: Neck supple. No masses.  Neck Symmetric.  Normal tracheal position.  No thyroid enlargement  Cardiovascular: Normal rate and regular rhythm.  Exam reveals no friction rub.  No murmur heard.  Respiratory:  Effort normal and breath sounds normal. No respiratory distress. No wheezes. No rales. No use of accessory muscles or intercostal retractions.  Neurological: alert.   Psychiatric: normal mood and affect. His behavior is normal.     Physical Exam    Lab Results   Component Value Date    WBC 9.3

## 2024-05-18 RX ORDER — DEXTROAMPHETAMINE SACCHARATE, AMPHETAMINE ASPARTATE MONOHYDRATE, DEXTROAMPHETAMINE SULFATE AND AMPHETAMINE SULFATE 7.5; 7.5; 7.5; 7.5 MG/1; MG/1; MG/1; MG/1
30 CAPSULE, EXTENDED RELEASE ORAL
Qty: 60 CAPSULE | Refills: 0 | Status: SHIPPED | OUTPATIENT
Start: 2024-05-19 | End: 2024-06-18

## 2024-05-20 DIAGNOSIS — M79.642 BILATERAL HAND PAIN: Primary | ICD-10-CM

## 2024-05-20 DIAGNOSIS — M79.641 BILATERAL HAND PAIN: Primary | ICD-10-CM

## 2024-05-20 DIAGNOSIS — M79.642 BILATERAL HAND PAIN: ICD-10-CM

## 2024-05-20 DIAGNOSIS — M79.641 BILATERAL HAND PAIN: ICD-10-CM

## 2024-05-20 LAB
CK SERPL-CCNC: 389 U/L (ref 39–308)
CRP SERPL HS-MCNC: 0.8 MG/DL (ref 0–0.5)
ERYTHROCYTE [SEDIMENTATION RATE] IN BLOOD BY WESTERGREN METHOD: 6 MM/HR (ref 0–10)
RHEUMATOID FACT SER NEPH-ACNC: <10 IU/ML
URATE SERPL-MCNC: 6.5 MG/DL (ref 3.4–7)

## 2024-05-22 LAB — NUCLEAR IGG SER QL IA: NORMAL

## 2024-06-17 DIAGNOSIS — G47.419 NARCOLEPSY WITHOUT CATAPLEXY: ICD-10-CM

## 2024-06-17 RX ORDER — DEXTROAMPHETAMINE SACCHARATE, AMPHETAMINE ASPARTATE MONOHYDRATE, DEXTROAMPHETAMINE SULFATE AND AMPHETAMINE SULFATE 7.5; 7.5; 7.5; 7.5 MG/1; MG/1; MG/1; MG/1
30 CAPSULE, EXTENDED RELEASE ORAL
Qty: 60 CAPSULE | Refills: 0 | Status: SHIPPED | OUTPATIENT
Start: 2024-06-18 | End: 2024-07-18

## 2024-06-17 NOTE — TELEPHONE ENCOUNTER
Requested Prescriptions     Pending Prescriptions Disp Refills    amphetamine-dextroamphetamine (ADDERALL XR) 30 MG extended release capsule 60 capsule 0     Sig: Take 1 capsule by mouth in the morning and 1 capsule in the evening. Do all this for 30 days.       Last Office Visit:  8/22/2023  Next Office Visit:  7/15/2024  Last Medication Refill:  5/19/24  Fer up to date:  5/17/24    *RX updated to reflect   6/18/24  fill date*

## 2024-06-19 ENCOUNTER — OFFICE VISIT (OUTPATIENT)
Dept: PRIMARY CARE CLINIC | Age: 44
End: 2024-06-19
Payer: COMMERCIAL

## 2024-06-19 ENCOUNTER — HOSPITAL ENCOUNTER (OUTPATIENT)
Dept: CT IMAGING | Age: 44
Discharge: HOME | End: 2024-06-19
Payer: COMMERCIAL

## 2024-06-19 VITALS
OXYGEN SATURATION: 97 % | DIASTOLIC BLOOD PRESSURE: 80 MMHG | WEIGHT: 315 LBS | BODY MASS INDEX: 46.65 KG/M2 | HEIGHT: 69 IN | SYSTOLIC BLOOD PRESSURE: 134 MMHG | HEART RATE: 91 BPM | TEMPERATURE: 96.8 F

## 2024-06-19 DIAGNOSIS — R31.0 GROSS HEMATURIA: Primary | ICD-10-CM

## 2024-06-19 DIAGNOSIS — R31.0 GROSS HEMATURIA: ICD-10-CM

## 2024-06-19 DIAGNOSIS — R35.0 URINE FREQUENCY: ICD-10-CM

## 2024-06-19 DIAGNOSIS — R30.0 DYSURIA: ICD-10-CM

## 2024-06-19 LAB
APPEARANCE FLUID: ABNORMAL
BILIRUBIN, POC: ABNORMAL
BLOOD URINE, POC: ABNORMAL
CLARITY, POC: ABNORMAL
COLOR, POC: ABNORMAL
GLUCOSE URINE, POC: ABNORMAL
KETONES, POC: ABNORMAL
LEUKOCYTE EST, POC: ABNORMAL
NITRITE, POC: ABNORMAL
PH, POC: 6
PROTEIN, POC: ABNORMAL
SPECIFIC GRAVITY, POC: 1.03
UROBILINOGEN, POC: 0.2

## 2024-06-19 PROCEDURE — 81002 URINALYSIS NONAUTO W/O SCOPE: CPT | Performed by: NURSE PRACTITIONER

## 2024-06-19 PROCEDURE — 99214 OFFICE O/P EST MOD 30 MIN: CPT | Performed by: NURSE PRACTITIONER

## 2024-06-19 PROCEDURE — 74176 CT ABD & PELVIS W/O CONTRAST: CPT

## 2024-06-19 PROCEDURE — 3079F DIAST BP 80-89 MM HG: CPT | Performed by: NURSE PRACTITIONER

## 2024-06-19 PROCEDURE — 3075F SYST BP GE 130 - 139MM HG: CPT | Performed by: NURSE PRACTITIONER

## 2024-06-19 SDOH — ECONOMIC STABILITY: FOOD INSECURITY: WITHIN THE PAST 12 MONTHS, YOU WORRIED THAT YOUR FOOD WOULD RUN OUT BEFORE YOU GOT MONEY TO BUY MORE.: NEVER TRUE

## 2024-06-19 SDOH — ECONOMIC STABILITY: FOOD INSECURITY: WITHIN THE PAST 12 MONTHS, THE FOOD YOU BOUGHT JUST DIDN'T LAST AND YOU DIDN'T HAVE MONEY TO GET MORE.: NEVER TRUE

## 2024-06-19 SDOH — ECONOMIC STABILITY: INCOME INSECURITY: HOW HARD IS IT FOR YOU TO PAY FOR THE VERY BASICS LIKE FOOD, HOUSING, MEDICAL CARE, AND HEATING?: NOT HARD AT ALL

## 2024-06-19 ASSESSMENT — ENCOUNTER SYMPTOMS
SORE THROAT: 0
CHEST TIGHTNESS: 0
ABDOMINAL PAIN: 0
SHORTNESS OF BREATH: 0
COUGH: 0
NAUSEA: 0
VOMITING: 0
COLOR CHANGE: 0
DIARRHEA: 0

## 2024-06-19 NOTE — PROGRESS NOTES
Mr.Joe Wyatt is a 43 y.o. male who presents today for  Chief Complaint   Patient presents with    Hematuria       HPI:  Patient here today with concern of gross hematuria for the past 2 days. He reports increased urine frequency and urgency. He denies any associated flank pain, fever or chills. He notes burning with urination as well. Patient has a photo of significant blood in the toilet. This has occurred on two occasions, but not with other times of urination.     Review of Systems   Constitutional:  Negative for activity change and fever.   HENT:  Negative for congestion, ear pain and sore throat.    Respiratory:  Negative for cough, chest tightness and shortness of breath.    Cardiovascular:  Negative for chest pain.   Gastrointestinal:  Negative for abdominal pain, diarrhea, nausea and vomiting.   Genitourinary:  Positive for dysuria, frequency, hematuria and urgency. Negative for flank pain.   Musculoskeletal:  Negative for arthralgias and myalgias.   Skin:  Negative for color change.   Neurological:  Negative for dizziness, weakness and numbness.   Psychiatric/Behavioral:  Negative for agitation. The patient is not nervous/anxious.        Past Medical History:   Diagnosis Date    Acute appendicitis 6/21/2022    Acute appendicitis with perforation, localized peritonitis, and abscess 6/23/2022    Headache     Hypertension     Low back pain     Narcolepsy without cataplexy(347.00)     Obstructive sleep apnea     AHI:  29.0 per PSG, 2012    PONV (postoperative nausea and vomiting)        Current Outpatient Medications   Medication Sig Dispense Refill    amphetamine-dextroamphetamine (ADDERALL XR) 30 MG extended release capsule Take 1 capsule by mouth in the morning and 1 capsule in the evening. Do all this for 30 days. 60 capsule 0    irbesartan (AVAPRO) 300 MG tablet TAKE 1 TABLET BY MOUTH EVERY DAY 90 tablet 1    naproxen (NAPROSYN) 500 MG tablet Take 1 tablet by mouth 2 times daily as needed for Pain 30

## 2024-06-21 LAB — BACTERIA UR CULT: NORMAL

## 2024-06-24 ENCOUNTER — PATIENT MESSAGE (OUTPATIENT)
Dept: PRIMARY CARE CLINIC | Age: 44
End: 2024-06-24

## 2024-06-24 DIAGNOSIS — R31.0 GROSS HEMATURIA: Primary | ICD-10-CM

## 2024-06-24 DIAGNOSIS — R35.0 URINE FREQUENCY: ICD-10-CM

## 2024-06-24 DIAGNOSIS — R30.0 DYSURIA: ICD-10-CM

## 2024-06-24 NOTE — TELEPHONE ENCOUNTER
From: Kris Wyatt  Sent: 6/24/2024 3:03 PM CDT  To: [facility] Practice Staff  Subject: Urologist referral    She called to have me seen by dr Kenney. I told her I knew dr Kenney and would prefer dr correa. She was going to set up the appointment with dr correa and call me back but I never got that call from her. So I am messaging now to find out when the appointment with dr correa will be

## 2024-07-03 DIAGNOSIS — R31.0 GROSS HEMATURIA: ICD-10-CM

## 2024-07-03 DIAGNOSIS — R35.0 URINE FREQUENCY: ICD-10-CM

## 2024-07-03 LAB
BASOPHILS # BLD: 0.1 K/UL (ref 0–0.2)
BASOPHILS NFR BLD: 0.8 % (ref 0–1)
EOSINOPHIL # BLD: 0.2 K/UL (ref 0–0.6)
EOSINOPHIL NFR BLD: 2.2 % (ref 0–5)
ERYTHROCYTE [DISTWIDTH] IN BLOOD BY AUTOMATED COUNT: 13.9 % (ref 11.5–14.5)
HCT VFR BLD AUTO: 45.5 % (ref 42–52)
HGB BLD-MCNC: 14.7 G/DL (ref 14–18)
IMM GRANULOCYTES # BLD: 0.1 K/UL
LYMPHOCYTES # BLD: 4 K/UL (ref 1.1–4.5)
LYMPHOCYTES NFR BLD: 41.4 % (ref 20–40)
MCH RBC QN AUTO: 30.8 PG (ref 27–31)
MCHC RBC AUTO-ENTMCNC: 32.3 G/DL (ref 33–37)
MCV RBC AUTO: 95.4 FL (ref 80–94)
MONOCYTES # BLD: 1.1 K/UL (ref 0–0.9)
MONOCYTES NFR BLD: 10.9 % (ref 0–10)
NEUTROPHILS # BLD: 4.2 K/UL (ref 1.5–7.5)
NEUTS SEG NFR BLD: 43.7 % (ref 50–65)
PLATELET # BLD AUTO: 314 K/UL (ref 130–400)
PMV BLD AUTO: 10.3 FL (ref 9.4–12.4)
PSA SERPL-MCNC: 0.76 NG/ML (ref 0–4)
RBC # BLD AUTO: 4.77 M/UL (ref 4.7–6.1)
WBC # BLD AUTO: 9.7 K/UL (ref 4.8–10.8)

## 2024-07-12 ENCOUNTER — TELEPHONE (OUTPATIENT)
Dept: PRIMARY CARE CLINIC | Age: 44
End: 2024-07-12

## 2024-07-12 NOTE — TELEPHONE ENCOUNTER
----- Message from DONALD Abad - CNP sent at 7/9/2024  3:34 PM CDT -----  Please notify patent that PSA is within normal limits and CBC does not reveal any significant blood loss

## 2024-07-15 ENCOUNTER — OFFICE VISIT (OUTPATIENT)
Dept: NEUROLOGY | Age: 44
End: 2024-07-15
Payer: COMMERCIAL

## 2024-07-15 VITALS
OXYGEN SATURATION: 97 % | RESPIRATION RATE: 20 BRPM | WEIGHT: 315 LBS | HEIGHT: 69 IN | SYSTOLIC BLOOD PRESSURE: 139 MMHG | BODY MASS INDEX: 46.65 KG/M2 | DIASTOLIC BLOOD PRESSURE: 90 MMHG | HEART RATE: 93 BPM

## 2024-07-15 DIAGNOSIS — G47.419 NARCOLEPSY WITHOUT CATAPLEXY: Primary | ICD-10-CM

## 2024-07-15 DIAGNOSIS — G47.33 OBSTRUCTIVE SLEEP APNEA: ICD-10-CM

## 2024-07-15 PROCEDURE — 3075F SYST BP GE 130 - 139MM HG: CPT | Performed by: PSYCHIATRY & NEUROLOGY

## 2024-07-15 PROCEDURE — 99213 OFFICE O/P EST LOW 20 MIN: CPT | Performed by: PSYCHIATRY & NEUROLOGY

## 2024-07-15 PROCEDURE — 3080F DIAST BP >= 90 MM HG: CPT | Performed by: PSYCHIATRY & NEUROLOGY

## 2024-07-15 RX ORDER — DEXTROAMPHETAMINE SACCHARATE, AMPHETAMINE ASPARTATE MONOHYDRATE, DEXTROAMPHETAMINE SULFATE AND AMPHETAMINE SULFATE 7.5; 7.5; 7.5; 7.5 MG/1; MG/1; MG/1; MG/1
30 CAPSULE, EXTENDED RELEASE ORAL
Qty: 60 CAPSULE | Refills: 0 | Status: SHIPPED | OUTPATIENT
Start: 2024-07-15 | End: 2024-08-14

## 2024-07-15 NOTE — PROGRESS NOTES
confrontation   III,IV, VI Extraocular movements are full   VII Facial movements are symmetrical without weakness   VIII Hearing is intact   XII No tongue atrophy or fasciculations.  Normal tongue protrusion.  No tongue weakness  Motor:  Normal strength in both upper and lower extremities.  Normal muscle tone and bulk.  Deep tendon reflexes are intact and symmetrical in both upper and lower extremities.  Rodríguez's signs are absent bilaterally.  There is no ankle clonus on either side.  Sensation:  Sensation is intact to light touch and vibration in all extremities.  Coordination:  Rapid alternating movements are normal in both upper and lower extremities.  Finger to nose testing is unimpaired bilaterally.  Gait:  Normal station and gait.    Pertinent Diagnostic Studies:  Sleep studies, [facility]    Assessment:       ICD-10-CM    1. Narcolepsy without cataplexy  G47.419       2. Obstructive sleep apnea  G47.33         I discussed the diagnosis of obstructive sleep apnea with Kris Wyatt including the pathophysiology (namely the mechanism of breathing and obstruction of upper airway, interruptions of sleep, hypoxemia, hypercapnia, and results of repetitive sympathetic activation), risks, evaluation, and treatment options.  I discussed the risks of driving when drowsy and advised that Kris Wyatt not drive when drowsy and avoid sedating medications and respiratory suppressants.    I discussed APAPs, masks and such with him.  I described how to set it up and referred him to educational videos.      Plan:   1. Use of APAP during sleep with a pressure range of 10cm to 20cm with a full face mask.  2. Continue use of Adderall  3. Weight loss  4. Follow up in 6 months      Electronically signed by Alejandro Ferris MD on 7/15/24

## 2024-07-29 ENCOUNTER — TELEPHONE (OUTPATIENT)
Dept: UROLOGY | Facility: CLINIC | Age: 44
End: 2024-07-29
Payer: COMMERCIAL

## 2024-07-29 NOTE — PROGRESS NOTES
"Chief Complaint  Gross Hematuria    Subjective          Garett Do presents to [facility] to gross hematuria.Hematuria  Severity of hematuria is Gross  Onset of hematuria was about  1 month ago.  It was sudden in onset.  It has been a(n) intermittent  problem.   Factors to be considered include s include use of NSAID intermittently.   Symptoms: Denies flank pain, dysuria or change in LUTS        Current Outpatient Medications:     amphetamine-dextroamphetamine XR (ADDERALL XR) 30 MG 24 hr capsule, TAKE 1 CAPSULE BY MOUTH IN THE MORNING AND 1 CAPSULE IN THE EVENING. DO ALL THIS FOR 30 DAYS., Disp: , Rfl:     doxycycline (MONODOX) 100 MG capsule, , Disp: , Rfl:     irbesartan (AVAPRO) 300 MG tablet, Take 1 tablet by mouth Daily., Disp: , Rfl:   History reviewed. No pertinent past medical history.  Past Surgical History:   Procedure Laterality Date    APPENDECTOMY  10/2022           Review of Systems      Objective   PHYSICAL EXAM  Vital Signs:   Ht 175.3 cm (69\")   Wt (!) 159 kg (350 lb)   BMI 51.69 kg/m²     Physical Exam      DATA  Result Review :              No results found for this or any previous visit.    CT Abdomen Pelvis Without Contrast (06/19/2024 11:17 EDT)   Reviewed report and the images.  The patient has a Bosniak 2 right renal cyst that is under 3 cm.  There is no solid component or concern for enhancement./DLS          Lab Results   Component Value Date    PSA 0.76 07/03/2024        ASSESSMENT AND PLAN          Problem List Items Addressed This Visit    None  Visit Diagnoses       Gross hematuria    -  Primary        This represents an undiagnosed new problem with uncertain prognosis until further evaluated.  Cystoscopy as the definitive lower urinary tract study is discussed . The risks of pain and discomfort, infection, and urethral stricture are discussed with the patient including the technique used in the office setting.  All patient questions were answered. "           FOLLOW UP     Return for cystoscopy next available.        (Please note that portions of this note were completed with a voice recognition program.)  Rebel Singletary MD  08/08/24  12:58 CDT

## 2024-08-08 ENCOUNTER — OFFICE VISIT (OUTPATIENT)
Dept: UROLOGY | Facility: CLINIC | Age: 44
End: 2024-08-08
Payer: COMMERCIAL

## 2024-08-08 VITALS — WEIGHT: 315 LBS | HEIGHT: 69 IN | BODY MASS INDEX: 46.65 KG/M2

## 2024-08-08 DIAGNOSIS — R31.0 GROSS HEMATURIA: Primary | ICD-10-CM

## 2024-08-08 PROCEDURE — 99204 OFFICE O/P NEW MOD 45 MIN: CPT | Performed by: UROLOGY

## 2024-08-08 RX ORDER — IRBESARTAN 300 MG/1
300 TABLET ORAL DAILY
COMMUNITY

## 2024-08-08 RX ORDER — DEXTROAMPHETAMINE SACCHARATE, AMPHETAMINE ASPARTATE MONOHYDRATE, DEXTROAMPHETAMINE SULFATE AND AMPHETAMINE SULFATE 7.5; 7.5; 7.5; 7.5 MG/1; MG/1; MG/1; MG/1
CAPSULE, EXTENDED RELEASE ORAL
COMMUNITY

## 2024-08-08 RX ORDER — DOXYCYCLINE 100 MG/1
CAPSULE ORAL
COMMUNITY
Start: 2024-07-28

## 2024-08-13 DIAGNOSIS — G47.419 NARCOLEPSY WITHOUT CATAPLEXY: ICD-10-CM

## 2024-08-13 NOTE — TELEPHONE ENCOUNTER
Requested Prescriptions     Pending Prescriptions Disp Refills    amphetamine-dextroamphetamine (ADDERALL XR) 30 MG extended release capsule 60 capsule 0     Sig: Take 1 capsule by mouth in the morning and 1 capsule in the evening. Do all this for 30 days. OK to fill early.       Last Office Visit:  7/15/2024  Next Office Visit:  1/23/2025  Last Medication Refill:  7/15/2024 with 0 RF   Fer up to date:  8/13/2024 pending manual process     *RX updated to reflect   8/14/2024   fill date*

## 2024-08-14 DIAGNOSIS — I10 ESSENTIAL (PRIMARY) HYPERTENSION: ICD-10-CM

## 2024-08-14 RX ORDER — DEXTROAMPHETAMINE SACCHARATE, AMPHETAMINE ASPARTATE MONOHYDRATE, DEXTROAMPHETAMINE SULFATE AND AMPHETAMINE SULFATE 7.5; 7.5; 7.5; 7.5 MG/1; MG/1; MG/1; MG/1
30 CAPSULE, EXTENDED RELEASE ORAL
Qty: 60 CAPSULE | Refills: 0 | Status: SHIPPED | OUTPATIENT
Start: 2024-08-14 | End: 2024-09-13

## 2024-08-15 ENCOUNTER — PROCEDURE VISIT (OUTPATIENT)
Dept: UROLOGY | Facility: CLINIC | Age: 44
End: 2024-08-15
Payer: COMMERCIAL

## 2024-08-15 DIAGNOSIS — R31.0 GROSS HEMATURIA: Primary | ICD-10-CM

## 2024-08-15 RX ORDER — IRBESARTAN 300 MG/1
300 TABLET ORAL DAILY
Qty: 90 TABLET | Refills: 1 | Status: SHIPPED | OUTPATIENT
Start: 2024-08-15

## 2024-08-15 NOTE — PROGRESS NOTES
CC: I am here for the doctor to look at my bladder    Cystoscopy procedure note  Pre- operative diagnosis:  Hematuria    Post operative diagnosis:  Same    Procedure:  The patient was prepped and draped in a normal sterile fashion.  The urethra was anesthetized with 2% lidocaine jelly.  A well lubricated flexible cystoscope was introduced per urethra.  The anterior urethra is normal in its caliber without evidence of a mass.  There appears to be no gross obstruction at the bladder neck although it is elevated.  The prostatic urethra is without any evidence of obstruction.  Neither is there excessive lateral nor median lobe enlargement for the patient's age.  The bladder shows a normal urothelium without evidence of a mass, erythema, nor diverticulum.  There is no evidence of a bladder stone nor foreign body.  The detrusor is minimally trabeculated.  The ureteral orifces are essentially normal in locationn and there is clear efflux of urine.    Patient tolerated the procedure well    Complications: none    Blood loss: minimal       ASSESSMENT AND PLAN          Problem List Items Addressed This Visit    None  Visit Diagnoses       Gross hematuria    -  Primary            Return in about 6 months (around 2/15/2025) for Follow up with Maxi Orozco PA-C.      Rebel Singletary MD  8/15/2024  09:06 CDT

## 2024-09-10 NOTE — TELEPHONE ENCOUNTER
Requested Prescriptions     Pending Prescriptions Disp Refills    amphetamine-dextroamphetamine (ADDERALL XR) 30 MG extended release capsule 60 capsule 0     Sig: Take 1 capsule by mouth in the morning and 1 capsule in the evening. Do all this for 30 days.       Last Office Visit:  8/22/2023  Next Office Visit:  7/15/2024  Last Medication Refill:  1/16/24  Fer up to date:  3/20/24    *RX updated to reflect   3/20/24  fill date*    
37.3

## 2024-09-11 DIAGNOSIS — G47.419 NARCOLEPSY WITHOUT CATAPLEXY: ICD-10-CM

## 2024-09-12 RX ORDER — DEXTROAMPHETAMINE SACCHARATE, AMPHETAMINE ASPARTATE MONOHYDRATE, DEXTROAMPHETAMINE SULFATE AND AMPHETAMINE SULFATE 7.5; 7.5; 7.5; 7.5 MG/1; MG/1; MG/1; MG/1
30 CAPSULE, EXTENDED RELEASE ORAL
Qty: 60 CAPSULE | Refills: 0 | Status: SHIPPED | OUTPATIENT
Start: 2024-09-12 | End: 2024-10-12

## 2024-10-08 DIAGNOSIS — G47.419 NARCOLEPSY WITHOUT CATAPLEXY: ICD-10-CM

## 2024-10-08 RX ORDER — DEXTROAMPHETAMINE SACCHARATE, AMPHETAMINE ASPARTATE MONOHYDRATE, DEXTROAMPHETAMINE SULFATE AND AMPHETAMINE SULFATE 7.5; 7.5; 7.5; 7.5 MG/1; MG/1; MG/1; MG/1
30 CAPSULE, EXTENDED RELEASE ORAL
Qty: 60 CAPSULE | Refills: 0 | Status: SHIPPED | OUTPATIENT
Start: 2024-10-09 | End: 2024-11-08
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
## 2024-10-28 DIAGNOSIS — G47.33 OBSTRUCTIVE SLEEP APNEA: Primary | ICD-10-CM

## 2024-11-08 DIAGNOSIS — L73.2 HYDRADENITIS: ICD-10-CM

## 2024-11-08 RX ORDER — DOXYCYCLINE 100 MG/1
CAPSULE ORAL DAILY
Qty: 90 CAPSULE | Refills: 1 | Status: SHIPPED | OUTPATIENT
Start: 2024-11-08

## 2024-11-11 DIAGNOSIS — G47.419 NARCOLEPSY WITHOUT CATAPLEXY: ICD-10-CM

## 2024-11-11 RX ORDER — DEXTROAMPHETAMINE SACCHARATE, AMPHETAMINE ASPARTATE MONOHYDRATE, DEXTROAMPHETAMINE SULFATE AND AMPHETAMINE SULFATE 7.5; 7.5; 7.5; 7.5 MG/1; MG/1; MG/1; MG/1
30 CAPSULE, EXTENDED RELEASE ORAL
Qty: 60 CAPSULE | Refills: 0 | Status: SHIPPED | OUTPATIENT
Start: 2024-11-11 | End: 2024-12-11

## 2024-11-11 NOTE — TELEPHONE ENCOUNTER
Requested Prescriptions     Pending Prescriptions Disp Refills    amphetamine-dextroamphetamine (ADDERALL XR) 30 MG extended release capsule 60 capsule 0     Sig: Take 1 capsule by mouth in the morning and 1 capsule in the evening. Do all this for 30 days. OK to fill early.       Last Office Visit:  7/15/2024  Next Office Visit:  1/23/2025  Last Medication Refill:  10/9/2024 with 0 RAFAELA Monroe up to date:  10/8/2024     *RX updated to reflect   11/11/2024   fill date*     Subjective:       Patient ID: Chon Hurtado is a 80 y.o. male.    Chief Complaint: Abdominal Pain ((off and on for a week)); Dizziness ((1 year) When going from a lying position to standing position, or when he walks around); Shortness of Breath ((1 year)When active); and Tachycardia (When active)    Patient is new to me.  Patient presents for evaluation of epigastric abdominal pain.  Patient states that he has had this pain off and on for several months.  He states that over the past week the pain has become more frequent.  His wife is giving him Tums.  He states that he is unsure if the Tums helps.  Patient states that he has had slight nausea but no vomiting.  He states that his bowel movements have been normal.  Patient is also complaining of chronic episodes of lightheadedness, shortness of breath, and heart palpitations.  Patient states that he discuss the symptoms with his Cardiology in the past.  Patient is taking meclizine without relief.  Patient has numerous chronic comorbidities including uncontrolled hypertension, chronic kidney disease stage 3, hyperparathyroidism, adrenal insufficiency, hypertension, coronary artery disease, and congestive heart failure.  Unfortunately I do not have any records from his cardiologist at this time.  Patients patient medical/surgical, social and family histories have been reviewed       Review of Systems   Constitutional: Positive for fatigue. Negative for appetite change.   Respiratory: Positive for shortness of breath. Negative for cough, chest tightness and wheezing.    Cardiovascular: Negative for chest pain, palpitations and leg swelling.   Gastrointestinal: Positive for abdominal pain. Negative for abdominal distention, anal bleeding, blood in stool, constipation, diarrhea, nausea, rectal pain and vomiting.   Endocrine: Negative for polydipsia and polyuria.   Musculoskeletal: Positive for arthralgias and gait problem. Negative for myalgias.   Skin: Negative for  rash and wound.   Neurological: Positive for weakness and light-headedness. Negative for dizziness, tremors, seizures, syncope, speech difficulty, numbness and headaches.       Objective:      Physical Exam   Constitutional: He is oriented to person, place, and time. He appears well-developed and well-nourished. He is cooperative. No distress.   HENT:   Head: Normocephalic and atraumatic.   Eyes: Conjunctivae and EOM are normal. No scleral icterus.   Neck: Carotid bruit is not present.   Cardiovascular: Normal rate, regular rhythm and normal heart sounds.   Pulmonary/Chest: Effort normal and breath sounds normal.   Abdominal: Soft. Normal appearance and bowel sounds are normal. He exhibits no distension. There is no hepatosplenomegaly. There is tenderness in the epigastric area. There is no rigidity, no rebound, no guarding and no CVA tenderness.   Musculoskeletal:        Right lower leg: He exhibits no edema.        Left lower leg: He exhibits no edema.   Neurological: He is alert and oriented to person, place, and time. He has normal strength. No cranial nerve deficit. He displays a negative Romberg sign. Coordination and gait normal.   Vitals reviewed.      Assessment:       1. Palpitations    2. Lightheaded    3. MAYES (dyspnea on exertion)    4. Epigastric pain    5. Hypertension associated with diabetes    6. Other specified symptoms and signs involving the circulatory and respiratory systems         Plan:       Chon was seen today for abdominal pain, dizziness, shortness of breath and tachycardia.    Diagnoses and all orders for this visit:    Palpitations  -     Six Minute Walk Test to qualify for Home Oxygen; Future    Lightheaded  -     CBC auto differential; Future  -     Comprehensive metabolic panel; Future  -     US Carotid Bilateral; Future    MAYES (dyspnea on exertion)  -     IN OFFICE EKG 12-LEAD (to Muse).  EKG reviewed and discussed with Dr. Whitaker   -     Brain natriuretic peptide; Future  -      X-Ray Chest PA And Lateral; Future  -     Six Minute Walk Test to qualify for Home Oxygen; Future  -     Spirometry with/without bronchodilator; Future    Epigastric pain   famotidine (PEPCID) 20 MG tablet; Take 1 tablet (20 mg total) by mouth once daily.    Hypertension associated with diabetes  -     Hemoglobin A1c; Future  -     Lipid panel; Future  -     MICROALBUMIN / CREATININE RATIO URINE; Future    Other specified symptoms and signs involving the circulatory and respiratory systems   -     US Carotid Bilateral; Future        Follow up for Follow-Up with PCP in 4-8 weeks .   DISCLAIMER: This note was prepared with [employer] voice recognition transcription software. Garbled syntax, mangled pronouns, and other bizarre constructions may be attributed to that software system   Patient readiness: acceptance and barriers:environmental    During the course of the visit the patient was educated and counseled about the following:     Diabetes:  Reminded to bring in blood sugar diary at next visit.  Hypertension:   Medication: no change.    Goals: Diabetes: Maintain Hemoglobin A1C below 7 and Hypertension: Reduce Blood Pressure    Did patient meet goals/outcomes: Yes    The following self management tools provided: blood pressure log  blood glucose log    Patient Instructions (the written plan) was given to the patient/family.     Time spent with patient: 45 minutes    Barriers to medications present (no )    Adverse reactions to current medications (no)    Over the counter medications reviewed (Yes)

## 2024-11-21 ENCOUNTER — OFFICE VISIT (OUTPATIENT)
Dept: PRIMARY CARE CLINIC | Age: 44
End: 2024-11-21
Payer: COMMERCIAL

## 2024-11-21 VITALS
OXYGEN SATURATION: 95 % | WEIGHT: 315 LBS | HEART RATE: 100 BPM | SYSTOLIC BLOOD PRESSURE: 152 MMHG | DIASTOLIC BLOOD PRESSURE: 94 MMHG | TEMPERATURE: 97 F | BODY MASS INDEX: 53.61 KG/M2

## 2024-11-21 DIAGNOSIS — L73.2 HIDRADENITIS: ICD-10-CM

## 2024-11-21 DIAGNOSIS — G47.419 NARCOLEPSY WITHOUT CATAPLEXY: ICD-10-CM

## 2024-11-21 DIAGNOSIS — E66.01 CLASS 3 SEVERE OBESITY DUE TO EXCESS CALORIES WITH SERIOUS COMORBIDITY AND BODY MASS INDEX (BMI) OF 50.0 TO 59.9 IN ADULT: ICD-10-CM

## 2024-11-21 DIAGNOSIS — E66.813 CLASS 3 SEVERE OBESITY DUE TO EXCESS CALORIES WITH SERIOUS COMORBIDITY AND BODY MASS INDEX (BMI) OF 50.0 TO 59.9 IN ADULT: ICD-10-CM

## 2024-11-21 DIAGNOSIS — G47.33 OBSTRUCTIVE SLEEP APNEA: ICD-10-CM

## 2024-11-21 DIAGNOSIS — I10 ESSENTIAL (PRIMARY) HYPERTENSION: Primary | ICD-10-CM

## 2024-11-21 PROCEDURE — 3080F DIAST BP >= 90 MM HG: CPT | Performed by: FAMILY MEDICINE

## 2024-11-21 PROCEDURE — 99214 OFFICE O/P EST MOD 30 MIN: CPT | Performed by: FAMILY MEDICINE

## 2024-11-21 PROCEDURE — 3077F SYST BP >= 140 MM HG: CPT | Performed by: FAMILY MEDICINE

## 2024-11-21 RX ORDER — TIRZEPATIDE 2.5 MG/.5ML
2.5 INJECTION, SOLUTION SUBCUTANEOUS WEEKLY
Qty: 2 ML | Refills: 5 | Status: SHIPPED | OUTPATIENT
Start: 2024-11-21

## 2024-11-21 RX ORDER — TIRZEPATIDE 5 MG/.5ML
5 INJECTION, SOLUTION SUBCUTANEOUS WEEKLY
Qty: 2 ML | Refills: 5 | Status: SHIPPED | OUTPATIENT
Start: 2024-11-21

## 2024-11-21 NOTE — PROGRESS NOTES
prescribed medications.         History, Medications, Allergies     No Known Allergies  _______________________________________________________________  Past Medical History:   Diagnosis Date    Acute appendicitis 6/21/2022    Acute appendicitis with perforation, localized peritonitis, and abscess 6/23/2022    Headache     Hypertension     Low back pain     Narcolepsy without cataplexy(347.00)     Obstructive sleep apnea     AHI:  29.0 per PSG, 2012    PONV (postoperative nausea and vomiting)      Past Surgical History:   Procedure Laterality Date    ABSCESS DRAINAGE      CHOLECYSTECTOMY, LAPAROSCOPIC N/A 9/28/2022    ROBOTIC INTERVAL APPENDECTOMY performed by Freedom Izquierdo DO at [facility] OR    COLONOSCOPY N/A 08/15/2022    Dr NATY Casillas-Some questionable erythema in the ileum, no ulcerations seen-AP x 1, HP x 1, 5 yr recall    LAPAROSCOPIC APPENDECTOMY N/A 06/21/2022    DIAGNOSTIC LAPAROSCOPY WITH DRAINAGE OF ABCESS AND DRAIN PLACEMENT performed by Freedom Izquierdo DO at [facility] OR      Family History   Problem Relation Age of Onset    Diabetes Mother     Hypertension Mother     Asthma Mother     Coronary Art Dis Father     Heart Attack Father     Hypertension Father     Cancer Maternal Grandmother         kidney    Kidney Cancer Maternal Grandmother     Stroke Paternal Grandfather     Cancer Paternal Grandfather         lung    Lung Cancer Paternal Grandfather     Colon Cancer Neg Hx     Colon Polyps Neg Hx     Social History     Tobacco Use    Smoking status: Never    Smokeless tobacco: Never   Substance Use Topics    Alcohol use: Not Currently     Comment: twice a year social        _______________________________________________________________    Note dictated using Dragon Dictation software  Sometimes this dictation software makes erroneous transcriptions.

## 2024-12-04 ENCOUNTER — HOSPITAL ENCOUNTER (OUTPATIENT)
Dept: SLEEP CENTER | Age: 44
Discharge: HOME | End: 2024-12-06
Payer: COMMERCIAL

## 2024-12-04 PROCEDURE — 95811 POLYSOM 6/>YRS CPAP 4/> PARM: CPT

## 2024-12-05 NOTE — PROGRESS NOTES
[facility]  [facility] [place]  [address]  Phone [phone] Fax [phone]     Sleep Study Technician Review    Patient Name:  Kris Wyatt  :   1980  Referring Provider: Alejandro Ferris MD    [facility] Fall Risk Assessment    Have you fallen in the past year? YES[] NO[x]  Do you feel unsteady when standing or walking? YES[] NO[x]  Are you worried about falling? YES[] NO[x]     aFall Risk screening requirement has been met    Not at risk for falls.    Brief History:  Kris Wyatt is a 44 y.o. male with a history of HTN, headache, SARY and Narcolepsy who presented for a split-night PSG. Previous sleep study in  revealed an AHI of 27 with an SpO2 latrice of 82%. Pt was on APAP 10-56tyI5L via a full-face mask. Pt has not used a CPAP machine in several years as his old machine was recalled and he did not receive a replacement machine.     Height:   69\"  Weight: 347lbs  BMI:  51.2  Neck Circ: 22.5\"  Mallampati  4  ESS:  19    Type of Study: Split Night PSG  Time Stage Position Snore Hypopnea Obs Apnea Chriss Apnea PAP O2   2200 Wake Supine No No No No N/A RA   2300 N2 Right Yes Yes No No N/A RA   2400 N2 Right Yes Yes No No N/A RA   0100 W/N2 Right Yes Yes No No 10 RA   0200 N2 Right Yes Yes No No 11 RA   0300 R/N2 Supine Yes Yes No No 13 RA   0400 REM Right Yes Yes No No 14 RA   0440 R/N2 Right Yes Yes Yes No 14 RA     Summary: Pt arrived at the sleep center on time. Tech introduced self, verified pt's name and , and escorted pt to room. Tech explained procedure and answered questions. Pt was instructed in supine sleep. Pt verbalized understanding of procedure. Pt was prepared for PSG per protocol without complication. Pt took a little more than an hour to settle into a sustained sleep state.  Numerous respiratory events were noted after sleep onset. When adequate sleep was accrued a CPAP trial was conducted. The SpO2 latrice was 81% during the diagnostic

## 2024-12-09 ENCOUNTER — OFFICE VISIT (OUTPATIENT)
Dept: PRIMARY CARE CLINIC | Age: 44
End: 2024-12-09
Payer: COMMERCIAL

## 2024-12-09 VITALS
HEART RATE: 118 BPM | WEIGHT: 315 LBS | SYSTOLIC BLOOD PRESSURE: 126 MMHG | HEIGHT: 69 IN | DIASTOLIC BLOOD PRESSURE: 88 MMHG | BODY MASS INDEX: 46.65 KG/M2 | TEMPERATURE: 97 F | OXYGEN SATURATION: 96 %

## 2024-12-09 DIAGNOSIS — H66.003 NON-RECURRENT ACUTE SUPPURATIVE OTITIS MEDIA OF BOTH EARS WITHOUT SPONTANEOUS RUPTURE OF TYMPANIC MEMBRANES: ICD-10-CM

## 2024-12-09 DIAGNOSIS — R09.81 NASAL CONGESTION: ICD-10-CM

## 2024-12-09 DIAGNOSIS — J40 BRONCHITIS: Primary | ICD-10-CM

## 2024-12-09 DIAGNOSIS — R05.1 ACUTE COUGH: ICD-10-CM

## 2024-12-09 LAB
INFLUENZA A ANTIGEN, POC: NEGATIVE
INFLUENZA B ANTIGEN, POC: NEGATIVE
LOT EXPIRE DATE: NORMAL
LOT KIT NUMBER: NORMAL
SARS-COV-2, POC: NORMAL
VALID INTERNAL CONTROL: NORMAL
VENDOR AND KIT NAME POC: NORMAL

## 2024-12-09 PROCEDURE — 3079F DIAST BP 80-89 MM HG: CPT | Performed by: NURSE PRACTITIONER

## 2024-12-09 PROCEDURE — 99214 OFFICE O/P EST MOD 30 MIN: CPT | Performed by: NURSE PRACTITIONER

## 2024-12-09 PROCEDURE — 3074F SYST BP LT 130 MM HG: CPT | Performed by: NURSE PRACTITIONER

## 2024-12-09 PROCEDURE — 96372 THER/PROPH/DIAG INJ SC/IM: CPT | Performed by: NURSE PRACTITIONER

## 2024-12-09 PROCEDURE — 87428 SARSCOV & INF VIR A&B AG IA: CPT | Performed by: NURSE PRACTITIONER

## 2024-12-09 RX ORDER — PREDNISONE 20 MG/1
20 TABLET ORAL 2 TIMES DAILY
Qty: 10 TABLET | Refills: 0 | Status: SHIPPED | OUTPATIENT
Start: 2024-12-09 | End: 2024-12-14

## 2024-12-09 RX ORDER — DEXAMETHASONE SODIUM PHOSPHATE 10 MG/ML
10 INJECTION INTRAMUSCULAR; INTRAVENOUS ONCE
Status: COMPLETED | OUTPATIENT
Start: 2024-12-09 | End: 2024-12-09

## 2024-12-09 RX ADMIN — DEXAMETHASONE SODIUM PHOSPHATE 10 MG: 10 INJECTION INTRAMUSCULAR; INTRAVENOUS at 14:19

## 2024-12-09 ASSESSMENT — ENCOUNTER SYMPTOMS
SORE THROAT: 0
VOMITING: 0
RHINORRHEA: 1
CHEST TIGHTNESS: 0
DIARRHEA: 0
SHORTNESS OF BREATH: 1
WHEEZING: 1
ABDOMINAL PAIN: 0
COUGH: 1
COLOR CHANGE: 0
NAUSEA: 0

## 2024-12-09 NOTE — PROGRESS NOTES
congestion  POCT COVID-19 & Influenza A/B          Results for orders placed or performed in visit on 24   POCT COVID-19 & Influenza A/B   Result Value Ref Range    SARS-COV-2, POC Not-Detected Not Detected    Influenza A Antigen, POC Negative Not Detected    Influenza B Antigen, POC Negative Not Detected    Vendor and kit name [employer]     Internal Control pass     Lot/Kit Number [id number]     Lot/Kit  date: 10/18/2025        Plan:     1. Bronchitis  Decadron give in office today and tolerated well. Start prednisone tomorrow.   - dexAMETHasone (DECADRON) injection 10 mg  - predniSONE (DELTASONE) 20 MG tablet; Take 1 tablet by mouth 2 times daily for 5 days  Dispense: 10 tablet; Refill: 0    2. Non-recurrent acute suppurative otitis media of both ears without spontaneous rupture of tympanic membranes  Start augmentin  - amoxicillin-clavulanate (AUGMENTIN) 875-125 MG per tablet; Take 1 tablet by mouth 2 times daily for 10 days  Dispense: 20 tablet; Refill: 0    3. Acute cough  Stay hydrated  - POCT COVID-19 & Influenza A/B    4. Nasal congestion  Stay hydrated   - POCT COVID-19 & Influenza A/B       Return if symptoms worsen or fail to improve.    Orders Placed This Encounter   Procedures    POCT COVID-19 & Influenza A/B     Order Specific Question:   Pregnant:     Answer:   No       Orders Placed This Encounter   Medications    dexAMETHasone (DECADRON) injection 10 mg    amoxicillin-clavulanate (AUGMENTIN) 875-125 MG per tablet     Sig: Take 1 tablet by mouth 2 times daily for 10 days     Dispense:  20 tablet     Refill:  0    predniSONE (DELTASONE) 20 MG tablet     Sig: Take 1 tablet by mouth 2 times daily for 5 days     Dispense:  10 tablet     Refill:  0            Patient offered educational handouts and has had all questions answered.  Patient voices understanding and agrees to plans along with risks and benefits of plan. Patient is instructed to continue prior meds, diet, and exercise plans as

## 2024-12-12 DIAGNOSIS — G47.419 NARCOLEPSY WITHOUT CATAPLEXY: ICD-10-CM

## 2024-12-12 RX ORDER — DEXTROAMPHETAMINE SACCHARATE, AMPHETAMINE ASPARTATE MONOHYDRATE, DEXTROAMPHETAMINE SULFATE AND AMPHETAMINE SULFATE 7.5; 7.5; 7.5; 7.5 MG/1; MG/1; MG/1; MG/1
30 CAPSULE, EXTENDED RELEASE ORAL
Qty: 60 CAPSULE | Refills: 0 | Status: SHIPPED | OUTPATIENT
Start: 2024-12-12 | End: 2025-01-11

## 2024-12-12 NOTE — TELEPHONE ENCOUNTER
Requested Prescriptions     Pending Prescriptions Disp Refills    amphetamine-dextroamphetamine (ADDERALL XR) 30 MG extended release capsule 60 capsule 0     Sig: Take 1 capsule by mouth in the morning and 1 capsule in the evening. Do all this for 30 days. OK to fill early.       Last Office Visit:  7/15/2024  Next Office Visit:  1/23/2025  Last Medication Refill:  11/11/2024 with 0 RAFAELA Monroe up to date:  10/8/2024    *RX updated to reflect   12/12/2024  fill date*

## 2025-01-09 DIAGNOSIS — G47.419 NARCOLEPSY WITHOUT CATAPLEXY: ICD-10-CM

## 2025-01-09 RX ORDER — DEXTROAMPHETAMINE SACCHARATE, AMPHETAMINE ASPARTATE MONOHYDRATE, DEXTROAMPHETAMINE SULFATE AND AMPHETAMINE SULFATE 7.5; 7.5; 7.5; 7.5 MG/1; MG/1; MG/1; MG/1
30 CAPSULE, EXTENDED RELEASE ORAL
Qty: 60 CAPSULE | Refills: 0 | Status: SHIPPED | OUTPATIENT
Start: 2025-01-11 | End: 2025-02-10

## 2025-01-09 NOTE — TELEPHONE ENCOUNTER
Requested Prescriptions     Pending Prescriptions Disp Refills    amphetamine-dextroamphetamine (ADDERALL XR) 30 MG extended release capsule 60 capsule 0     Sig: Take 1 capsule by mouth in the morning and 1 capsule in the evening. Do all this for 30 days. OK to fill early.       Last Office Visit:  7/15/2024  Next Office Visit:  1/23/2025  Last Medication Refill:  12/12/2024 with 0 RF   Fer up to date: 1/9/2025 pending manual process     *RX updated to reflect   1/11/2025  fill date*

## 2025-01-10 DIAGNOSIS — G47.33 SLEEP APNEA, OBSTRUCTIVE: Primary | ICD-10-CM

## 2025-01-10 NOTE — PROGRESS NOTES
[facility]  [address] [place] [address]  Phone [phone] Fax [phone]     Patient Name: Kris Wyatt 2024  : 1980  Age: 44 y.o.  Patient Address: [address] [place] [address]       Patient Phone: [phone] (home) [phone] (work)    REFERRAL  Referred to: DME provider of patient's choice  Kris Wyatt is referred for the following:    DME Equipment HPCPS Code Setting   Auto Adjusting CPAP device with flex or comparable pressure relief per comfort  10cm to 20cm   Heated Humidifier  Patient Choice       Replinishible PAP Supplies, 1 year supply  Item HPCPS Code Frequency   Mask of choice  or  1 per 3 months   Nasal Mask cushion/pillows  or  2 per 30 days   Full Face Mask Interface  1 per 30 days   Headgear  1 per 6 months   Tubing, length of choice  or  1 per 3 months   Water Chamber  1 per 6 months   Chinstrap  1 per 6 months   Disposable Filters  2 per 30 days   Reusable Filters  1 per 6 months     Diagnoses:  Obstructive sleep apnea (G47.33)  Length of Need: Lifetime, 99    Ordering Provider: Alejandro Ferris M.D.  NPI: [id number]         Signature:       Date: 2024   Electronically Signed by Alejandro Ferris M.D.

## 2025-01-13 ENCOUNTER — FOLLOWUP TELEPHONE ENCOUNTER (OUTPATIENT)
Dept: SLEEP CENTER | Age: 45
End: 2025-01-13

## 2025-01-13 NOTE — TELEPHONE ENCOUNTER
LMOM with results of split night sleep study.  Explained order for auto cpap.  [facility]. Dr Alejandro Ferris is the referring provider.

## 2025-01-23 ENCOUNTER — OFFICE VISIT (OUTPATIENT)
Dept: NEUROLOGY | Age: 45
End: 2025-01-23
Payer: COMMERCIAL

## 2025-01-23 VITALS
RESPIRATION RATE: 18 BRPM | WEIGHT: 315 LBS | HEART RATE: 95 BPM | HEIGHT: 69 IN | BODY MASS INDEX: 46.65 KG/M2 | SYSTOLIC BLOOD PRESSURE: 141 MMHG | DIASTOLIC BLOOD PRESSURE: 86 MMHG

## 2025-01-23 DIAGNOSIS — G47.419 NARCOLEPSY WITHOUT CATAPLEXY: Primary | ICD-10-CM

## 2025-01-23 DIAGNOSIS — E66.01 MORBID OBESITY: ICD-10-CM

## 2025-01-23 DIAGNOSIS — G47.33 OBSTRUCTIVE SLEEP APNEA: ICD-10-CM

## 2025-01-23 PROCEDURE — 99213 OFFICE O/P EST LOW 20 MIN: CPT | Performed by: PSYCHIATRY & NEUROLOGY

## 2025-01-23 PROCEDURE — 3079F DIAST BP 80-89 MM HG: CPT | Performed by: PSYCHIATRY & NEUROLOGY

## 2025-01-23 PROCEDURE — 3077F SYST BP >= 140 MM HG: CPT | Performed by: PSYCHIATRY & NEUROLOGY

## 2025-01-23 NOTE — PROGRESS NOTES
[facility]  [address], [address]  [place]  [address]  Phone [phone]  Fax [phone]     [facility] Follow Up Encounter  25 9:20 AM CST    Information:   Patient Name: Kris Wyatt  :   1980  Age:   44 y.o.  MRN:   [id number]  Account #:  [id number]  Today:  25    Provider: Alejandro Ferris M.D.     Chief Complaint:   Chief Complaint   Patient presents with    Follow-up       Subjective:   Kris Wyatt is a 44 y.o. man with narcolepsy and obstructive sleep apnea who is following up.  He was diagnosed with narcolepsy in .  He takes Adderall for his narcolepsy.  It works fairly well.  He has had no side effects.  He has tried Ritalin, Dexadrine, Xyrem, and Provigil in the past.  He gained weight and had refractory hypersomnolence and in , when he weighed 257 pounds, he was diagnosed with SARY with an AHI of 27 and low oxygen saturation of 82%.  He was set up on CPAP at 12cm at that time.  After that machine stopped functioning, he did not replace it.  He had recurrent snoring, witnessed apneas, and poor sleep.  Last visit, he was referred for a sleep study.  The Split night PSG on 2024 showed severe SARY with an AHI of 64/1 and low sat of 82%.  He was ordered APAP at 10cm to 20cm.  He has not yet acquired his machine but is due in a week or so.        Objective:     Past Medical History:  Past Medical History:   Diagnosis Date    Acute appendicitis 2022    Acute appendicitis with perforation, localized peritonitis, and abscess 2022    Headache     Hypertension     Low back pain     Narcolepsy without cataplexy(347.00)     Obstructive sleep apnea     AHI:  29.0 per PSG,     PONV (postoperative nausea and vomiting)        Past Surgical History:   Procedure Laterality Date    ABSCESS DRAINAGE      CHOLECYSTECTOMY, LAPAROSCOPIC N/A 2022    ROBOTIC INTERVAL APPENDECTOMY performed by Freedom Izquierdo DO at [facility] OR    COLONOSCOPY N/A

## 2025-02-09 DIAGNOSIS — G47.419 NARCOLEPSY WITHOUT CATAPLEXY: ICD-10-CM

## 2025-02-10 RX ORDER — DEXTROAMPHETAMINE SACCHARATE, AMPHETAMINE ASPARTATE MONOHYDRATE, DEXTROAMPHETAMINE SULFATE AND AMPHETAMINE SULFATE 7.5; 7.5; 7.5; 7.5 MG/1; MG/1; MG/1; MG/1
30 CAPSULE, EXTENDED RELEASE ORAL
Qty: 60 CAPSULE | Refills: 0 | Status: SHIPPED | OUTPATIENT
Start: 2025-02-10 | End: 2025-03-12

## 2025-02-10 NOTE — TELEPHONE ENCOUNTER
Requested Prescriptions     Pending Prescriptions Disp Refills    amphetamine-dextroamphetamine (ADDERALL XR) 30 MG extended release capsule 60 capsule 0     Sig: Take 1 capsule by mouth in the morning and 1 capsule in the evening. Do all this for 30 days. OK to fill early.       Last Office Visit:  1/23/2025  Next Office Visit:  7/29/2025  Last Medication Refill:  1/11/25  Fer up to date:  1/2025    *RX updated to reflect   2/10/25  fill date*

## 2025-03-11 DIAGNOSIS — G47.419 NARCOLEPSY WITHOUT CATAPLEXY: ICD-10-CM

## 2025-03-12 RX ORDER — DEXTROAMPHETAMINE SACCHARATE, AMPHETAMINE ASPARTATE MONOHYDRATE, DEXTROAMPHETAMINE SULFATE AND AMPHETAMINE SULFATE 7.5; 7.5; 7.5; 7.5 MG/1; MG/1; MG/1; MG/1
30 CAPSULE, EXTENDED RELEASE ORAL
Qty: 60 CAPSULE | Refills: 0 | Status: SHIPPED | OUTPATIENT
Start: 2025-03-12 | End: 2025-04-11

## 2025-03-12 NOTE — TELEPHONE ENCOUNTER
Requested Prescriptions     Pending Prescriptions Disp Refills    amphetamine-dextroamphetamine (ADDERALL XR) 30 MG extended release capsule 60 capsule 0     Sig: Take 1 capsule by mouth in the morning and 1 capsule in the evening. Do all this for 30 days. OK to fill early.       Last Office Visit: 1/23/2025  Next Office Visit: 7/29/2025  Last Medication Refill:2/10/2025    Fer boyce

## 2025-04-02 DIAGNOSIS — I10 ESSENTIAL (PRIMARY) HYPERTENSION: ICD-10-CM

## 2025-04-02 RX ORDER — IRBESARTAN 300 MG/1
300 TABLET ORAL DAILY
Qty: 90 TABLET | Refills: 1 | Status: SHIPPED | OUTPATIENT
Start: 2025-04-02

## 2025-04-02 NOTE — TELEPHONE ENCOUNTER
Kris Edmundo called to request a refill on his medication.      Last office visit : 12/9/2024   Next office visit : 5/23/2025     Requested Prescriptions     Signed Prescriptions Disp Refills    irbesartan (AVAPRO) 300 MG tablet 90 tablet 1     Sig: TAKE 1 TABLET BY MOUTH EVERY DAY     Authorizing Provider: MUSA HENDERSON     Ordering User: ANA CRAMER LPN

## 2025-04-10 DIAGNOSIS — G47.419 NARCOLEPSY WITHOUT CATAPLEXY: ICD-10-CM

## 2025-04-10 RX ORDER — DEXTROAMPHETAMINE SACCHARATE, AMPHETAMINE ASPARTATE MONOHYDRATE, DEXTROAMPHETAMINE SULFATE AND AMPHETAMINE SULFATE 7.5; 7.5; 7.5; 7.5 MG/1; MG/1; MG/1; MG/1
30 CAPSULE, EXTENDED RELEASE ORAL 2 TIMES DAILY
Qty: 60 CAPSULE | Refills: 0 | Status: SHIPPED | OUTPATIENT
Start: 2025-04-10 | End: 2025-05-10

## 2025-04-10 NOTE — TELEPHONE ENCOUNTER
Kris Wyatt has requested a refill on his medication.      Last office visit : 1/23/2025   Next office visit : 7/29/2025   Last medication refill : 03/12/2025  Fer : 04/10/2025      Requested Prescriptions     Pending Prescriptions Disp Refills    amphetamine-dextroamphetamine (ADDERALL XR) 30 MG extended release capsule 60 capsule 0     Sig: Take 1 capsule by mouth in the morning and at bedtime for 30 days. Max Daily Amount: 60 mg

## 2025-05-07 DIAGNOSIS — L73.2 HYDRADENITIS: ICD-10-CM

## 2025-05-08 RX ORDER — DOXYCYCLINE 100 MG/1
CAPSULE ORAL DAILY
Qty: 90 CAPSULE | Refills: 1 | Status: SHIPPED | OUTPATIENT
Start: 2025-05-08

## 2025-05-09 ENCOUNTER — OFFICE VISIT (OUTPATIENT)
Dept: PRIMARY CARE CLINIC | Age: 45
End: 2025-05-09
Payer: COMMERCIAL

## 2025-05-09 ENCOUNTER — RESULTS FOLLOW-UP (OUTPATIENT)
Dept: PRIMARY CARE CLINIC | Age: 45
End: 2025-05-09

## 2025-05-09 VITALS
HEART RATE: 101 BPM | WEIGHT: 315 LBS | BODY MASS INDEX: 55.23 KG/M2 | SYSTOLIC BLOOD PRESSURE: 148 MMHG | OXYGEN SATURATION: 97 % | DIASTOLIC BLOOD PRESSURE: 92 MMHG

## 2025-05-09 DIAGNOSIS — G47.419 NARCOLEPSY WITHOUT CATAPLEXY: ICD-10-CM

## 2025-05-09 DIAGNOSIS — E66.813 CLASS 3 SEVERE OBESITY DUE TO EXCESS CALORIES WITH SERIOUS COMORBIDITY AND BODY MASS INDEX (BMI) OF 50.0 TO 59.9 IN ADULT (HCC): ICD-10-CM

## 2025-05-09 DIAGNOSIS — J01.40 ACUTE NON-RECURRENT PANSINUSITIS: ICD-10-CM

## 2025-05-09 DIAGNOSIS — R05.1 ACUTE COUGH: ICD-10-CM

## 2025-05-09 DIAGNOSIS — I10 ESSENTIAL (PRIMARY) HYPERTENSION: ICD-10-CM

## 2025-05-09 LAB
INFLUENZA A ANTIGEN, POC: NEGATIVE
INFLUENZA B ANTIGEN, POC: NEGATIVE
LOT EXPIRE DATE: NORMAL
LOT KIT NUMBER: NORMAL
SARS-COV-2, POC: NORMAL
VALID INTERNAL CONTROL: YES
VENDOR AND KIT NAME POC: NORMAL

## 2025-05-09 PROCEDURE — 3077F SYST BP >= 140 MM HG: CPT | Performed by: NURSE PRACTITIONER

## 2025-05-09 PROCEDURE — 87428 SARSCOV & INF VIR A&B AG IA: CPT | Performed by: NURSE PRACTITIONER

## 2025-05-09 PROCEDURE — 3080F DIAST BP >= 90 MM HG: CPT | Performed by: NURSE PRACTITIONER

## 2025-05-09 PROCEDURE — 99214 OFFICE O/P EST MOD 30 MIN: CPT | Performed by: NURSE PRACTITIONER

## 2025-05-09 PROCEDURE — 96372 THER/PROPH/DIAG INJ SC/IM: CPT | Performed by: NURSE PRACTITIONER

## 2025-05-09 RX ORDER — DEXAMETHASONE SODIUM PHOSPHATE 10 MG/ML
10 INJECTION, SOLUTION INTRA-ARTICULAR; INTRALESIONAL; INTRAMUSCULAR; INTRAVENOUS; SOFT TISSUE ONCE
Status: COMPLETED | OUTPATIENT
Start: 2025-05-09 | End: 2025-05-09

## 2025-05-09 RX ORDER — METHYLPREDNISOLONE 4 MG/1
TABLET ORAL
Qty: 1 KIT | Refills: 0 | Status: SHIPPED | OUTPATIENT
Start: 2025-05-09 | End: 2025-05-15

## 2025-05-09 RX ORDER — DEXTROMETHORPHAN HYDROBROMIDE AND PROMETHAZINE HYDROCHLORIDE 15; 6.25 MG/5ML; MG/5ML
5 SYRUP ORAL 4 TIMES DAILY PRN
Qty: 118 ML | Refills: 0 | Status: SHIPPED | OUTPATIENT
Start: 2025-05-09 | End: 2025-05-16

## 2025-05-09 RX ADMIN — DEXAMETHASONE SODIUM PHOSPHATE 10 MG: 10 INJECTION, SOLUTION INTRA-ARTICULAR; INTRALESIONAL; INTRAMUSCULAR; INTRAVENOUS; SOFT TISSUE at 14:45

## 2025-05-09 ASSESSMENT — ENCOUNTER SYMPTOMS
SORE THROAT: 1
NAUSEA: 0
SHORTNESS OF BREATH: 0
DIARRHEA: 0
WHEEZING: 0
ABDOMINAL PAIN: 0
CHEST TIGHTNESS: 0
COUGH: 1

## 2025-05-09 NOTE — PROGRESS NOTES
Mr.Joe Wyatt is a 44 y.o. male who presents today for  Chief Complaint   Patient presents with    Cough     Cough, congestion, slight sore throat, headache x 3 days        HPI:  History of Present Illness  The patient of Dr. Barnett presents for evaluation of cough, headache.    He has been experiencing a persistent cough, accompanied by a headache, rhinorrhea, and sinus pressure. He reports no febrile episodes. He has not been exposed to any known illnesses such as influenza or COVID-19. He has no history of asthma or COPD and is a non-smoker.  Symptoms have been ongoing for 3 to 4 days.  He also reports a sore throat and the production of greenish-yellow sputum when coughing or blowing his nose. He has been self-medicating with DayQuil every 4 hours. He has previously tried Tessalon Perles for his cough, but found it ineffective. He has been prescribed a liquid cough medicine containing hydrocodone by Dr. Barnett, which he found beneficial.    His blood pressure is slightly elevated today, which he attributes to his headache and cough. He is currently on irbesartan for blood pressure management.    He was prescribed Zepbound by Dr. Barnett in 11/2024 for weight loss, but initially was been unable to obtain it due to unavailability.  He states it is available at [facility] and would like a new prescription.    Supplemental Information  He takes doxycycline daily for hidradenitis prophylaxis.    SOCIAL HISTORY  He does not smoke.    ALLERGIES  The patient has no known allergies.      Results      Review of Systems   Constitutional:  Negative for chills and fever.   HENT:  Positive for congestion, postnasal drip and sore throat. Negative for ear pain.    Respiratory:  Positive for cough. Negative for chest tightness, shortness of breath and wheezing.    Cardiovascular:  Negative for chest pain.   Gastrointestinal:  Negative for abdominal pain, diarrhea and nausea.   Musculoskeletal:  Negative for arthralgias and myalgias.

## 2025-05-12 ENCOUNTER — TELEPHONE (OUTPATIENT)
Dept: PRIMARY CARE CLINIC | Age: 45
End: 2025-05-12

## 2025-05-12 NOTE — TELEPHONE ENCOUNTER
He needs to discuss further with Dr. Barnett.  Dr. Barnett initially prescribed this and I just refilled at patient's request at recent acute visit.

## 2025-05-12 NOTE — TELEPHONE ENCOUNTER
Kris Wyatt has requested a refill on his medication.      Last office visit : 1/23/2025   Next office visit : 7/29/2025   Last medication refill : 04/10/2025  Fer : 04/10/2025      Requested Prescriptions     Pending Prescriptions Disp Refills    amphetamine-dextroamphetamine (ADDERALL XR) 30 MG extended release capsule 60 capsule 0     Sig: Take 1 capsule by mouth in the morning and at bedtime for 30 days. Max Daily Amount: 60 mg

## 2025-05-14 DIAGNOSIS — G47.419 NARCOLEPSY WITHOUT CATAPLEXY: ICD-10-CM

## 2025-05-14 RX ORDER — DEXTROAMPHETAMINE SACCHARATE, AMPHETAMINE ASPARTATE MONOHYDRATE, DEXTROAMPHETAMINE SULFATE AND AMPHETAMINE SULFATE 7.5; 7.5; 7.5; 7.5 MG/1; MG/1; MG/1; MG/1
30 CAPSULE, EXTENDED RELEASE ORAL 2 TIMES DAILY
Qty: 60 CAPSULE | Refills: 0 | Status: SHIPPED | OUTPATIENT
Start: 2025-05-14 | End: 2025-05-14 | Stop reason: SDUPTHER

## 2025-05-14 NOTE — TELEPHONE ENCOUNTER
Requested Prescriptions     Pending Prescriptions Disp Refills    amphetamine-dextroamphetamine (ADDERALL XR) 30 MG extended release capsule 60 capsule 0     Sig: Take 1 capsule by mouth in the morning and at bedtime for 30 days. Max Daily Amount: 60 mg       Last Office Visit: 1/23/2025  Next Office Visit: 7/29/2025  Last Medication Refill: request sent to you yesterday didn't have a pharmacy attached so script didn't go anywhere when it was signed. New scripted teed up and pharmacy is attached this time.

## 2025-05-15 RX ORDER — DEXTROAMPHETAMINE SACCHARATE, AMPHETAMINE ASPARTATE MONOHYDRATE, DEXTROAMPHETAMINE SULFATE AND AMPHETAMINE SULFATE 7.5; 7.5; 7.5; 7.5 MG/1; MG/1; MG/1; MG/1
30 CAPSULE, EXTENDED RELEASE ORAL 2 TIMES DAILY
Qty: 60 CAPSULE | Refills: 0 | Status: SHIPPED | OUTPATIENT
Start: 2025-05-15 | End: 2025-06-14

## 2025-05-23 ENCOUNTER — OFFICE VISIT (OUTPATIENT)
Dept: PRIMARY CARE CLINIC | Age: 45
End: 2025-05-23
Payer: COMMERCIAL

## 2025-05-23 ENCOUNTER — TELEPHONE (OUTPATIENT)
Dept: PRIMARY CARE CLINIC | Age: 45
End: 2025-05-23

## 2025-05-23 VITALS
DIASTOLIC BLOOD PRESSURE: 88 MMHG | HEART RATE: 103 BPM | SYSTOLIC BLOOD PRESSURE: 132 MMHG | BODY MASS INDEX: 54.93 KG/M2 | TEMPERATURE: 97.5 F | OXYGEN SATURATION: 96 % | WEIGHT: 315 LBS

## 2025-05-23 DIAGNOSIS — E66.813 CLASS 3 SEVERE OBESITY DUE TO EXCESS CALORIES WITH SERIOUS COMORBIDITY AND BODY MASS INDEX (BMI) OF 40.0 TO 44.9 IN ADULT (HCC): ICD-10-CM

## 2025-05-23 DIAGNOSIS — G47.33 OBSTRUCTIVE SLEEP APNEA: ICD-10-CM

## 2025-05-23 DIAGNOSIS — E66.813 CLASS 3 SEVERE OBESITY DUE TO EXCESS CALORIES WITH SERIOUS COMORBIDITY AND BODY MASS INDEX (BMI) OF 50.0 TO 59.9 IN ADULT (HCC): ICD-10-CM

## 2025-05-23 DIAGNOSIS — I10 ESSENTIAL (PRIMARY) HYPERTENSION: Primary | ICD-10-CM

## 2025-05-23 DIAGNOSIS — L73.2 HIDRADENITIS: ICD-10-CM

## 2025-05-23 PROCEDURE — 99214 OFFICE O/P EST MOD 30 MIN: CPT | Performed by: FAMILY MEDICINE

## 2025-05-23 PROCEDURE — 3079F DIAST BP 80-89 MM HG: CPT | Performed by: FAMILY MEDICINE

## 2025-05-23 PROCEDURE — 3075F SYST BP GE 130 - 139MM HG: CPT | Performed by: FAMILY MEDICINE

## 2025-05-23 ASSESSMENT — PATIENT HEALTH QUESTIONNAIRE - PHQ9
SUM OF ALL RESPONSES TO PHQ QUESTIONS 1-9: 0
2. FEELING DOWN, DEPRESSED OR HOPELESS: NOT AT ALL
SUM OF ALL RESPONSES TO PHQ QUESTIONS 1-9: 0
1. LITTLE INTEREST OR PLEASURE IN DOING THINGS: NOT AT ALL

## 2025-05-23 NOTE — PROGRESS NOTES
[facility]  [address]  [address]  [place] [address]  Dept: [phone]  Dept Fax: [phone]  Loc: [phone]    Kris Wyatt is a 44 y.o. male who presents today for his medical conditions/complaints as noted below.  Kris Wyatt is here for 6 Month Follow-Up         Patient History:      Obstructive sleep apnea with narcolepsy  -[facility] neurology        pthx        Subjective:   CC:  Here today to discuss the following:    Hypertension  Compliant with medications.  No adverse effects from medication.  No lightheadedness, palpitations, or chest pain.    He was prescribed Zepbound last visit for management of his obesity and obstructive sleep apnea.  His insurance denied the medication stating that he needed a sleep study performed within the last year.  -He completed a sleep study January 10, 2025 with an AHI score of 64.1 consistent with severe obstructive sleep apnea.    He continues to take doxycycline for management of his recurrent hidradenitis.    Review of Systems   Constitutional: Negative for chills and fever.   HENT: Negative for congestion.    Respiratory: Negative for cough, chest tightness and shortness of breath.  Cardiovascular: Negative for chest pain, palpitations and leg swelling.   Gastrointestinal: Negative for abdominal pain, anal bleeding, constipation, diarrhea and nausea.     Review of Systems  Objective:   /88   Pulse (!) 103   Temp 97.5 °F (36.4 °C)   Wt (!) 168.7 kg (372 lb)   SpO2 96%   BMI 54.93 kg/m²   BP Readings from Last 3 Encounters:   05/23/25 132/88   05/09/25 (!) 148/92   01/23/25 (!) 141/86    Wt Readings from Last 3 Encounters:   05/23/25 (!) 168.7 kg (372 lb)   05/09/25 (!) 169.6 kg (374 lb)   01/23/25 (!) 160.6 kg (354 lb)           Physical Exam  Physical Exam   Constitutional: He appears well. Does not appear ill.   Neck: Neck supple. No masses.  Neck Symmetric.  Normal tracheal position.  No thyroid

## 2025-05-23 NOTE — TELEPHONE ENCOUNTER
----- Message from Dr. Mike Barnett MD sent at 5/23/2025  2:12 PM CDT -----  Regarding: Zepbound denial  His insurance denied Zepbound as he has not had a sleep study within the past year  He had 1 completed January 2025  I have also written a letter.  Can you send a letter as well as the sleep study dated January 10, 2025 for appeal.  Information should be scanned into media  Thanks,.

## 2025-06-11 DIAGNOSIS — G47.419 NARCOLEPSY WITHOUT CATAPLEXY: ICD-10-CM

## 2025-06-11 NOTE — TELEPHONE ENCOUNTER
Requested Prescriptions     Pending Prescriptions Disp Refills    amphetamine-dextroamphetamine (ADDERALL XR) 30 MG extended release capsule 60 capsule 0     Sig: Take 1 capsule by mouth in the morning and at bedtime for 30 days. Max Daily Amount: 60 mg       Last Office Visit:  1/23/2025  Next Office Visit:  7/29/2025  Last Medication Refill:  5/15/2025  Fer up to date:  up to date    *RX updated to reflect   6/14/2025  fill date*

## 2025-06-13 RX ORDER — DEXTROAMPHETAMINE SACCHARATE, AMPHETAMINE ASPARTATE MONOHYDRATE, DEXTROAMPHETAMINE SULFATE AND AMPHETAMINE SULFATE 7.5; 7.5; 7.5; 7.5 MG/1; MG/1; MG/1; MG/1
30 CAPSULE, EXTENDED RELEASE ORAL 2 TIMES DAILY
Qty: 60 CAPSULE | Refills: 0 | Status: SHIPPED | OUTPATIENT
Start: 2025-06-14 | End: 2025-07-14

## 2025-07-11 DIAGNOSIS — G47.419 NARCOLEPSY WITHOUT CATAPLEXY: ICD-10-CM

## 2025-07-14 RX ORDER — DEXTROAMPHETAMINE SACCHARATE, AMPHETAMINE ASPARTATE MONOHYDRATE, DEXTROAMPHETAMINE SULFATE AND AMPHETAMINE SULFATE 7.5; 7.5; 7.5; 7.5 MG/1; MG/1; MG/1; MG/1
30 CAPSULE, EXTENDED RELEASE ORAL 2 TIMES DAILY
Qty: 60 CAPSULE | Refills: 0 | Status: SHIPPED | OUTPATIENT
Start: 2025-07-14 | End: 2025-08-13

## 2025-07-14 NOTE — TELEPHONE ENCOUNTER
Requested Prescriptions     Pending Prescriptions Disp Refills    amphetamine-dextroamphetamine (ADDERALL XR) 30 MG extended release capsule 60 capsule 0     Sig: Take 1 capsule by mouth in the morning and at bedtime for 30 days. Max Daily Amount: 60 mg       Last Office Visit:  1/23/2025  Next Office Visit:  7/29/2025  Last Medication Refill:  6/13/2025  Fer up to date:  up  to date    *RX updated to reflect   7/14/2025  fill date*    Pt of Dr LINDY Weinberg is on call and she can not sign off on this medication

## 2025-07-29 DIAGNOSIS — I10 ESSENTIAL (PRIMARY) HYPERTENSION: ICD-10-CM

## 2025-07-29 DIAGNOSIS — Z13.220 LIPID SCREENING: ICD-10-CM

## 2025-07-29 DIAGNOSIS — R73.9 HYPERGLYCEMIA: ICD-10-CM

## 2025-07-29 LAB
ALBUMIN SERPL-MCNC: 3.9 G/DL (ref 3.5–5.2)
ALP SERPL-CCNC: 75 U/L (ref 40–129)
ALT SERPL-CCNC: 54 U/L (ref 10–50)
ANION GAP SERPL CALCULATED.3IONS-SCNC: 11 MMOL/L (ref 8–16)
AST SERPL-CCNC: 31 U/L (ref 10–50)
BASOPHILS # BLD: 0.1 K/UL (ref 0–0.2)
BASOPHILS NFR BLD: 1.1 % (ref 0–1)
BILIRUB SERPL-MCNC: 0.4 MG/DL (ref 0.2–1.2)
BUN SERPL-MCNC: 13 MG/DL (ref 6–20)
CALCIUM SERPL-MCNC: 9 MG/DL (ref 8.6–10)
CHLORIDE SERPL-SCNC: 102 MMOL/L (ref 98–107)
CHOLEST SERPL-MCNC: 196 MG/DL (ref 0–199)
CO2 SERPL-SCNC: 26 MMOL/L (ref 22–29)
CREAT SERPL-MCNC: 0.9 MG/DL (ref 0.7–1.2)
EOSINOPHIL # BLD: 0.1 K/UL (ref 0–0.6)
EOSINOPHIL NFR BLD: 1.5 % (ref 0–5)
ERYTHROCYTE [DISTWIDTH] IN BLOOD BY AUTOMATED COUNT: 13.4 % (ref 11.5–14.5)
GLUCOSE SERPL-MCNC: 182 MG/DL (ref 70–99)
HBA1C MFR BLD: 8 % (ref 4–5.6)
HCT VFR BLD AUTO: 46.9 % (ref 42–52)
HDLC SERPL-MCNC: 43 MG/DL (ref 40–60)
HGB BLD-MCNC: 15.2 G/DL (ref 14–18)
IMM GRANULOCYTES # BLD: 0.1 K/UL
LDLC SERPL CALC-MCNC: 126 MG/DL
LYMPHOCYTES # BLD: 3.5 K/UL (ref 1.1–4.5)
LYMPHOCYTES NFR BLD: 36.6 % (ref 20–40)
MCH RBC QN AUTO: 30.4 PG (ref 27–31)
MCHC RBC AUTO-ENTMCNC: 32.4 G/DL (ref 33–37)
MCV RBC AUTO: 93.8 FL (ref 80–94)
MONOCYTES # BLD: 0.8 K/UL (ref 0–0.9)
MONOCYTES NFR BLD: 8.7 % (ref 0–10)
NEUTROPHILS # BLD: 4.8 K/UL (ref 1.5–7.5)
NEUTS SEG NFR BLD: 50.8 % (ref 50–65)
PLATELET # BLD AUTO: 277 K/UL (ref 130–400)
PMV BLD AUTO: 10.8 FL (ref 9.4–12.4)
POTASSIUM SERPL-SCNC: 4.3 MMOL/L (ref 3.5–5.1)
PROT SERPL-MCNC: 6.7 G/DL (ref 6.4–8.3)
RBC # BLD AUTO: 5 M/UL (ref 4.7–6.1)
SODIUM SERPL-SCNC: 139 MMOL/L (ref 136–145)
TRIGL SERPL-MCNC: 135 MG/DL (ref 0–149)
WBC # BLD AUTO: 9.5 K/UL (ref 4.8–10.8)

## 2025-08-13 DIAGNOSIS — G47.419 NARCOLEPSY WITHOUT CATAPLEXY: ICD-10-CM

## 2025-08-14 RX ORDER — DEXTROAMPHETAMINE SACCHARATE, AMPHETAMINE ASPARTATE MONOHYDRATE, DEXTROAMPHETAMINE SULFATE AND AMPHETAMINE SULFATE 7.5; 7.5; 7.5; 7.5 MG/1; MG/1; MG/1; MG/1
30 CAPSULE, EXTENDED RELEASE ORAL 2 TIMES DAILY
Qty: 60 CAPSULE | Refills: 0 | Status: SHIPPED | OUTPATIENT
Start: 2025-08-14 | End: 2025-09-13

## (undated) DEVICE — SOLUTION ANTIFOG VIS SYS CLEARIFY LAPSCP

## (undated) DEVICE — TROCAR: Brand: KII FIOS FIRST ENTRY

## (undated) DEVICE — UNDERGLOVE SURG SZ 7.5 FNGR THK0.21MIL GRN LTX BEAD CUF

## (undated) DEVICE — TUBE ET 7.5MM NSL ORAL BASIC CUF INTMED MURPHY EYE RADPQ

## (undated) DEVICE — SUTURE MCRYL SZ 4-0 L18IN ABSRB UD L19MM PS-2 3/8 CIR PRIM Y496G

## (undated) DEVICE — TOTAL TRAY, 16FR 10ML SIL FOLEY, URN: Brand: MEDLINE

## (undated) DEVICE — BLADELESS OBTURATOR: Brand: WECK VISTA

## (undated) DEVICE — GLOVE SURG SZ 7 CRM LTX FREE POLYISOPRENE POLYMER BEAD ANTI

## (undated) DEVICE — SUTURE VCRL SZ 0 L27IN ABSRB VLT L36MM UR-5 5/8 CIR J376H

## (undated) DEVICE — SUTURE VCRL SZ 3-0 L27IN ABSRB UD L26MM SH 1/2 CIR J416H

## (undated) DEVICE — BAG SPEC REM 224ML W4XL6IN DIA10MM 1 HND GYN DISP ENDOPCH

## (undated) DEVICE — FILTER CLP DISP FOR 5513E CLIPVAC

## (undated) DEVICE — GENERAL LAP CDS

## (undated) DEVICE — DRAIN SURG 19FR 100% SIL RND END PERF

## (undated) DEVICE — ARM DRAPE

## (undated) DEVICE — CANNULA SEAL

## (undated) DEVICE — JACKSON-PRATT 100CC BULB RESERVOIR: Brand: CARDINAL HEALTH

## (undated) DEVICE — REDUCER: Brand: ENDOWRIST

## (undated) DEVICE — CANNULA NSL AD L7FT DIV O2 CO2 W/ M LUERLOCK TRMPT CONN

## (undated) DEVICE — GLOVE SURG SZ 75 L12IN FNGR THK79MIL GRN LTX FREE

## (undated) DEVICE — GOWN,PREVENTION PLUS,XL,ST,24/CS: Brand: MEDLINE

## (undated) DEVICE — NEEDLE INSUF L120MM ULT VERES ENDOPATH

## (undated) DEVICE — DECANTER VI VENT W/ VLV FOR ASEP TRNSF OF FLD

## (undated) DEVICE — SOLUTION IV 1000ML 0.9% SOD CHL

## (undated) DEVICE — SEALER LAP L37CM MARYLAND JAW OPN NANO COAT MULTIFUNCTIONAL

## (undated) DEVICE — PUMP SUC IRR TBNG L10FT W/ HNDPC ASSEMB STRYKEFLOW 2

## (undated) DEVICE — CURAVIEW LED LARYNSCP BLDE

## (undated) DEVICE — SINGLE PORT MANIFOLD: Brand: NEPTUNE 2

## (undated) DEVICE — ENDO KIT,LOURDES HOSPITAL: Brand: MEDLINE INDUSTRIES, INC.

## (undated) DEVICE — SUREFORM 45: Brand: SUREFORM

## (undated) DEVICE — SUREFORM 45 RELOAD BLUE: Brand: SUREFORM

## (undated) DEVICE — FORCEPS BX 240CM 2.4MM L NDL RAD JAW 4 M00513334

## (undated) DEVICE — ADHESIVE SKIN CLSR 0.7ML TOP DERMBND ADV

## (undated) DEVICE — TROCAR: Brand: KII® SLEEVE

## (undated) DEVICE — SEAL

## (undated) DEVICE — COVER,MAYO STAND,STERILE: Brand: MEDLINE

## (undated) DEVICE — CUTTER ENDOSCP L340MM LIN ARTC SGL STROKE FIRING ENDOPATH